# Patient Record
Sex: MALE | Race: WHITE | Employment: FULL TIME | URBAN - METROPOLITAN AREA
[De-identification: names, ages, dates, MRNs, and addresses within clinical notes are randomized per-mention and may not be internally consistent; named-entity substitution may affect disease eponyms.]

---

## 2018-08-29 ENCOUNTER — OFFICE VISIT (OUTPATIENT)
Dept: FAMILY MEDICINE | Facility: CLINIC | Age: 53
End: 2018-08-29
Payer: COMMERCIAL

## 2018-08-29 ENCOUNTER — RADIANT APPOINTMENT (OUTPATIENT)
Dept: GENERAL RADIOLOGY | Facility: CLINIC | Age: 53
End: 2018-08-29
Attending: NURSE PRACTITIONER
Payer: COMMERCIAL

## 2018-08-29 VITALS
TEMPERATURE: 98.7 F | WEIGHT: 315 LBS | OXYGEN SATURATION: 97 % | SYSTOLIC BLOOD PRESSURE: 130 MMHG | RESPIRATION RATE: 14 BRPM | HEART RATE: 79 BPM | DIASTOLIC BLOOD PRESSURE: 96 MMHG

## 2018-08-29 DIAGNOSIS — M79.89 SWELLING OF BOTH LOWER EXTREMITIES: ICD-10-CM

## 2018-08-29 DIAGNOSIS — R07.9 CHEST PAIN, UNSPECIFIED TYPE: Primary | ICD-10-CM

## 2018-08-29 DIAGNOSIS — R06.02 SHORTNESS OF BREATH: ICD-10-CM

## 2018-08-29 DIAGNOSIS — E89.0 POSTOPERATIVE HYPOTHYROIDISM: ICD-10-CM

## 2018-08-29 DIAGNOSIS — R10.12 LUQ ABDOMINAL PAIN: ICD-10-CM

## 2018-08-29 DIAGNOSIS — R07.9 CHEST PAIN, UNSPECIFIED TYPE: ICD-10-CM

## 2018-08-29 DIAGNOSIS — R03.0 ELEVATED BLOOD PRESSURE READING WITHOUT DIAGNOSIS OF HYPERTENSION: ICD-10-CM

## 2018-08-29 PROBLEM — R73.03 PREDIABETES: Status: ACTIVE | Noted: 2018-08-29

## 2018-08-29 LAB
ALBUMIN SERPL-MCNC: 4 G/DL (ref 3.4–5)
ALP SERPL-CCNC: 78 U/L (ref 40–150)
ALT SERPL W P-5'-P-CCNC: 26 U/L (ref 0–70)
ANION GAP SERPL CALCULATED.3IONS-SCNC: 6 MMOL/L (ref 3–14)
AST SERPL W P-5'-P-CCNC: 15 U/L (ref 0–45)
BASOPHILS # BLD AUTO: 0 10E9/L (ref 0–0.2)
BASOPHILS NFR BLD AUTO: 0.4 %
BILIRUB SERPL-MCNC: 0.5 MG/DL (ref 0.2–1.3)
BUN SERPL-MCNC: 16 MG/DL (ref 7–30)
CALCIUM SERPL-MCNC: 8.9 MG/DL (ref 8.5–10.1)
CHLORIDE SERPL-SCNC: 103 MMOL/L (ref 94–109)
CO2 SERPL-SCNC: 29 MMOL/L (ref 20–32)
CREAT SERPL-MCNC: 0.86 MG/DL (ref 0.66–1.25)
DIFFERENTIAL METHOD BLD: NORMAL
EOSINOPHIL # BLD AUTO: 0.2 10E9/L (ref 0–0.7)
EOSINOPHIL NFR BLD AUTO: 2.6 %
ERYTHROCYTE [DISTWIDTH] IN BLOOD BY AUTOMATED COUNT: 13.9 % (ref 10–15)
GFR SERPL CREATININE-BSD FRML MDRD: >90 ML/MIN/1.7M2
GLUCOSE SERPL-MCNC: 95 MG/DL (ref 70–99)
HBA1C MFR BLD: 6.2 % (ref 0–5.6)
HCT VFR BLD AUTO: 44.6 % (ref 40–53)
HGB BLD-MCNC: 14.5 G/DL (ref 13.3–17.7)
LIPASE SERPL-CCNC: 175 U/L (ref 73–393)
LYMPHOCYTES # BLD AUTO: 2.8 10E9/L (ref 0.8–5.3)
LYMPHOCYTES NFR BLD AUTO: 30.7 %
MCH RBC QN AUTO: 29.2 PG (ref 26.5–33)
MCHC RBC AUTO-ENTMCNC: 32.5 G/DL (ref 31.5–36.5)
MCV RBC AUTO: 90 FL (ref 78–100)
MONOCYTES # BLD AUTO: 0.8 10E9/L (ref 0–1.3)
MONOCYTES NFR BLD AUTO: 8.4 %
NEUTROPHILS # BLD AUTO: 5.2 10E9/L (ref 1.6–8.3)
NEUTROPHILS NFR BLD AUTO: 57.9 %
NT-PROBNP SERPL-MCNC: 55 PG/ML (ref 0–125)
PLATELET # BLD AUTO: 302 10E9/L (ref 150–450)
POTASSIUM SERPL-SCNC: 4.3 MMOL/L (ref 3.4–5.3)
PROT SERPL-MCNC: 7.8 G/DL (ref 6.8–8.8)
RBC # BLD AUTO: 4.96 10E12/L (ref 4.4–5.9)
SODIUM SERPL-SCNC: 138 MMOL/L (ref 133–144)
TSH SERPL DL<=0.005 MIU/L-ACNC: 3.56 MU/L (ref 0.4–4)
WBC # BLD AUTO: 9 10E9/L (ref 4–11)

## 2018-08-29 PROCEDURE — 83880 ASSAY OF NATRIURETIC PEPTIDE: CPT | Performed by: NURSE PRACTITIONER

## 2018-08-29 PROCEDURE — 99204 OFFICE O/P NEW MOD 45 MIN: CPT | Performed by: NURSE PRACTITIONER

## 2018-08-29 PROCEDURE — 84443 ASSAY THYROID STIM HORMONE: CPT | Performed by: NURSE PRACTITIONER

## 2018-08-29 PROCEDURE — 80053 COMPREHEN METABOLIC PANEL: CPT | Performed by: NURSE PRACTITIONER

## 2018-08-29 PROCEDURE — 36415 COLL VENOUS BLD VENIPUNCTURE: CPT | Performed by: NURSE PRACTITIONER

## 2018-08-29 PROCEDURE — 93000 ELECTROCARDIOGRAM COMPLETE: CPT | Performed by: NURSE PRACTITIONER

## 2018-08-29 PROCEDURE — 83036 HEMOGLOBIN GLYCOSYLATED A1C: CPT | Performed by: NURSE PRACTITIONER

## 2018-08-29 PROCEDURE — 83690 ASSAY OF LIPASE: CPT | Performed by: NURSE PRACTITIONER

## 2018-08-29 PROCEDURE — 71046 X-RAY EXAM CHEST 2 VIEWS: CPT | Mod: FY

## 2018-08-29 PROCEDURE — 85025 COMPLETE CBC W/AUTO DIFF WBC: CPT | Performed by: NURSE PRACTITIONER

## 2018-08-29 RX ORDER — LEVOTHYROXINE SODIUM 137 UG/1
TABLET ORAL
COMMUNITY
Start: 2018-08-13 | End: 2018-08-29

## 2018-08-29 RX ORDER — LEVOTHYROXINE SODIUM 137 UG/1
TABLET ORAL
COMMUNITY
Start: 2018-08-13 | End: 2019-12-31

## 2018-08-29 RX ORDER — HYDROXYZINE HYDROCHLORIDE 50 MG/1
50 TABLET, FILM COATED ORAL
COMMUNITY
Start: 2018-08-14 | End: 2019-12-09

## 2018-08-29 RX ORDER — METFORMIN HYDROCHLORIDE 750 MG/1
1500 TABLET, EXTENDED RELEASE ORAL
COMMUNITY
Start: 2017-11-20 | End: 2019-07-15

## 2018-08-29 RX ORDER — HYDROXYZINE HYDROCHLORIDE 50 MG/1
TABLET, FILM COATED ORAL
COMMUNITY
Start: 2018-08-14 | End: 2018-08-29

## 2018-08-29 ASSESSMENT — PAIN SCALES - GENERAL: PAINLEVEL: MODERATE PAIN (5)

## 2018-08-29 NOTE — PROGRESS NOTES
"  SUBJECTIVE:   Lupillo Smith is a 53 year old male who presents to clinic today for the following health issues:      CHEST PAIN     Onset: 2-3 weeks    Description:   Location:  right side  Character: achey  Radiation: epigastrium  Duration: intermittent    Intensity: mild    Progression of Symptoms:  worsening    Accompanying Signs & Symptoms:  Shortness of breath: YES  Sweating: YES- not related  Nausea/vomiting: no   Lightheadedness: no   Palpitations: no  Fever/Chills: no   Cough: YES  Heartburn: no     History:   Family history of heart disease no   Tobacco use: YES- occasional cigars    Precipitating factors:   Worse with exertion: no  Worse with deep breaths :  no  Related to food: Not sure    Alleviating factors:  None       Therapies Tried and outcome: None      53 year old male presents with the above concerns. His main concern is lower extremity leg swelling that's been present for several months. He also notes 2-3 weeks of midsternal chest pain that comes randomly. It is not associated with exertion. \"Nothing seems to trigger it.\" Seems to be better with activity - \"If I'm active, I don't have it. I only get it when I'm sitting for a while\" but then notes that sometimes he does get it when he's up and moving around. Pain does not radiate. No diaphoresis. Pain does not come with inspiration/expiration. Reports intermittent mild shortness of breath x1 year. No wheezing. It is not associated with chest pain although sometimes it does occur at the same time which he thinks he coincidence. LE swelling is worse throughout the day, left worse than right. Needs right hip replaced and tends to favor his right leg. Started feeling like he has a cold coming on 4 days ago. His chest pain started long before these cold symptoms. Denies acid reflux. Reports LUQ abdominal pain and \"low chest\" pain as well. Feels like a cramp - different than the chest pain he gets at times. No nausea or vomiting. No history of " "treated hypertension, although it appears he has been borderline hypertensive at many of his visits previously at outside clinics. Smokes cigars couple times per month. Reports a poor diet.    He recently had an insurance change and needs to switch clinics. He also needs referral for new endocrinologist. He has a history of thyroid cancer and had his thyroid removed several years ago. His previous endocrinologist has him on levothyroxine 274 mcg/day. His last TSH was May 2017 per his report. She also started him on metformin 1500 mg q24h for \"pre-diabetes.\"     No primary care provider for a couple of years.      Problem list and histories reviewed & adjusted, as indicated.  Additional history: as documented    Patient Active Problem List   Diagnosis     Prediabetes     History reviewed. No pertinent surgical history.    Social History   Substance Use Topics     Smoking status: Light Tobacco Smoker     Types: Cigars     Smokeless tobacco: Never Used     Alcohol use Yes     History reviewed. No pertinent family history.      Current Outpatient Prescriptions   Medication Sig Dispense Refill     hydrOXYzine (ATARAX) 50 MG tablet Take 50 mg by mouth       levothyroxine (SYNTHROID/LEVOTHROID) 137 MCG tablet TAKE TWO TABLETS BY MOUTH DAILY        metFORMIN (GLUCOPHAGE-XR) 750 MG 24 hr tablet Take 1,500 mg by mouth       Allergies   Allergen Reactions     Oxycodone Nausea and Vomiting     Aspirin Hives     Ibuprofen Hives     Nsaids Swelling       Reviewed and updated as needed this visit by clinical staff  Tobacco  Allergies  Meds  Problems  Med Hx  Surg Hx  Fam Hx  Soc Hx        Reviewed and updated as needed this visit by Provider  Allergies  Meds  Problems         ROS:  Constitutional, HEENT, cardiovascular, pulmonary, GI, , musculoskeletal, neuro, skin, endocrine and psych systems are negative, except as otherwise noted.    OBJECTIVE:     BP (!) 130/96  Pulse 79  Temp 98.7  F (37.1  C) (Oral)  Resp 14  " Wt (!) 371 lb (168.3 kg)  SpO2 97%  There is no height or weight on file to calculate BMI.  GENERAL: healthy, alert, morbidly obese male in no distress.   EYES: Eyes grossly normal to inspection, PERRL and conjunctivae and sclerae normal  HENT: ear canals and TM's normal, nose and mouth without ulcers or lesions  NECK: no adenopathy, no asymmetry, masses, or scars and thyroid normal to palpation  RESP: lungs clear to auscultation - no rales, rhonchi or wheezes  CV: regular rate and rhythm, normal S1 S2, no S3 or S4, no murmur, click or rub, no peripheral edema and peripheral pulses strong  ABDOMEN: soft, nontender, bowel sounds normal - exam difficult due to body habitus   MS: no gross musculoskeletal defects noted, no edema. I am unable to reproduce the chest pain that he's describing with palpation.   PSYCH: mentation appears normal, affect normal/bright    Diagnostic Test Results:  Results for orders placed or performed in visit on 08/29/18   CBC with platelets differential   Result Value Ref Range    WBC 9.0 4.0 - 11.0 10e9/L    RBC Count 4.96 4.4 - 5.9 10e12/L    Hemoglobin 14.5 13.3 - 17.7 g/dL    Hematocrit 44.6 40.0 - 53.0 %    MCV 90 78 - 100 fl    MCH 29.2 26.5 - 33.0 pg    MCHC 32.5 31.5 - 36.5 g/dL    RDW 13.9 10.0 - 15.0 %    Platelet Count 302 150 - 450 10e9/L    Diff Method Automated Method     % Neutrophils 57.9 %    % Lymphocytes 30.7 %    % Monocytes 8.4 %    % Eosinophils 2.6 %    % Basophils 0.4 %    Absolute Neutrophil 5.2 1.6 - 8.3 10e9/L    Absolute Lymphocytes 2.8 0.8 - 5.3 10e9/L    Absolute Monocytes 0.8 0.0 - 1.3 10e9/L    Absolute Eosinophils 0.2 0.0 - 0.7 10e9/L    Absolute Basophils 0.0 0.0 - 0.2 10e9/L   Comprehensive metabolic panel   Result Value Ref Range    Sodium 138 133 - 144 mmol/L    Potassium 4.3 3.4 - 5.3 mmol/L    Chloride 103 94 - 109 mmol/L    Carbon Dioxide 29 20 - 32 mmol/L    Anion Gap 6 3 - 14 mmol/L    Glucose 95 70 - 99 mg/dL    Urea Nitrogen 16 7 - 30 mg/dL     Creatinine 0.86 0.66 - 1.25 mg/dL    GFR Estimate >90 >60 mL/min/1.7m2    GFR Estimate If Black >90 >60 mL/min/1.7m2    Calcium 8.9 8.5 - 10.1 mg/dL    Bilirubin Total 0.5 0.2 - 1.3 mg/dL    Albumin 4.0 3.4 - 5.0 g/dL    Protein Total 7.8 6.8 - 8.8 g/dL    Alkaline Phosphatase 78 40 - 150 U/L    ALT 26 0 - 70 U/L    AST 15 0 - 45 U/L   Lipase   Result Value Ref Range    Lipase 175 73 - 393 U/L   Hemoglobin A1c   Result Value Ref Range    Hemoglobin A1C 6.2 (H) 0 - 5.6 %   BNP-N terminal pro   Result Value Ref Range    N-Terminal Pro Bnp 55 0 - 125 pg/mL   TSH with free T4 reflex   Result Value Ref Range    TSH 3.56 0.40 - 4.00 mU/L   EKG 12-lead complete w/read - Clinics    Narrative    Sinus  Rhythm   Low voltage in precordial leads.    -Old anterior infarct.        ASSESSMENT/PLAN:       ICD-10-CM    1. Chest pain, unspecified type R07.9 EKG 12-lead complete w/read - Clinics     CBC with platelets differential     Comprehensive metabolic panel     Lipase     Hemoglobin A1c     BNP-N terminal pro     XR Chest 2 Views     CARDIOLOGY EVAL ADULT REFERRAL   2. Shortness of breath R06.02 CBC with platelets differential     BNP-N terminal pro     XR Chest 2 Views     CARDIOLOGY EVAL ADULT REFERRAL   3. LUQ abdominal pain R10.12 H Pylori antigen stool     CBC with platelets differential     Comprehensive metabolic panel     Lipase   4. Swelling of both lower extremities M79.89 CBC with platelets differential     BNP-N terminal pro     CARDIOLOGY EVAL ADULT REFERRAL   5. Postoperative hypothyroidism E89.0 ENDOCRINOLOGY ADULT REFERRAL     TSH with free T4 reflex   6. Elevated blood pressure reading without diagnosis of hypertension R03.0 CARDIOLOGY EVAL ADULT REFERRAL     Pleasant 53 year old male with past medical history significant for thyroid cancer and subsequently surgical hypothyroidism, morbid obesity and pre-diabetes with chest pain x3 weeks intermittently, shortness of breath x1 year, and lower extremity edema for  several months. EKG with sinus rhythm and labs unremarkable. Chest pain not reproducible. Doesn't sound pulmonary or musculoskeletal at this time. Will refer to cardiology for further evaluation. Will also refer to endocrinology so he can establish care as he needs a new endocrinologist due to insurance changes. Patient is stable today and pain not currently present, although patient reports that he occurs daily. Patient given close clinic follow up instructions, including emergency department precautions.    See Patient Instructions    The benefits, risks and potential side effects were discussed in detail. Black box warnings discussed as relevant. All patient questions were answered. The patient was instructed to follow up immediately if any adverse reactions develop.    Patient verbalizes understanding and agrees with plan of care. Patient stable for discharge.      CRYSTAL Johnson [facility]

## 2018-08-29 NOTE — PATIENT INSTRUCTIONS
Labs pending - we will notify you with results  Further plan pending results of labs  Please schedule with endocrinology        HOW TO QUIT SMOKING  Smoking is one of the hardest habits to break. About half of all those who have ever smoked have been able to quit, and most of those (about 70%) who still smoke want to quit. Here are some of the best ways to stop smoking.     KEEP TRYING:  It takes most smokers about 8 tries before they are finally able to fully quit. So, the more often you try and fail, the better your chance of quitting the next time! So, don't give up!    GO COLD TURKEY:  Most ex-smokers quit cold turkey. Trying to cut back gradually doesn't seem to work as well, perhaps because it continues the smoking habit. Also, it is possible to fool yourself by inhaling more while smoking fewer cigarettes. This results in the same amount of nicotine in your body!    GET SUPPORT:  Support programs can make an important difference, especially for the heavy smoker. These groups offer lectures, methods to change your behavior and peer support. Call the free national Quitline for more information. 800-QUIT-NOW (211-048-0898). Low-cost or free programs are offered by many hospitals, local chapters of the American Lung Association (977-147-0735) and the American Cancer Society (246-727-5130). Support at home is important too. Non-smokers can help by offering praise and encouragement. If the smoker fails to quit, encourage them to try again!    OVER-THE-COUNTER MEDICINES:  For those who can't quit on their own, Nicotine Replacement Therapy (NRT) may make quitting much easier. Certain aids such as the nicotine patch, gum and lozenge are available without a prescription. However, it is best to use these under the guidance of your doctor. The skin patch provides a steady supply of nicotine to the body. Nicotine gum and lozenge gives temporary bursts of low levels of nicotine. Both methods take the edge off the craving  for cigarettes. WARNING: If you feel symptoms of nicotine overdose, such as nausea, vomiting, dizziness, weakness, or fast heartbeat, stop using these and see your doctor.    PRESCRIPTION MEDICINES:  After evaluating your smoking patterns and prior attempts at quitting, your doctor may offer a prescription medicine such as bupropion (Zyban, Wellbutrin), varenicline (Chantix, Champix), a niocotine inhaler or nasal spray. Each has its unique advantage and side effects which your doctor can review with you.    HEALTH BENEFITS OF QUITTING:  The benefits of quitting start right away and keep improving the longer you go without smokin minutes: blood pressure and pulse return to normal  8 hours: oxygen levels return to normal  2 days: ability to smell and taste begins to improve as damaged nerves start to regrow  2-3 weeks: circulation and lung function improves  1-9 months: decreased cough, congestion and shortness of breath; less tired  1 year: risk of heart attack decreases by half  5 years: risk of lung cancer decreases by half; risk of stroke becomes the same as a non-smoker  For information about how to quit smoking, visit the following links:  National Cancer [place] ,   Clearing the Air, Quit Smoking Today   - an online booklet. http://www.smokefree.gov/pubs/clearing_the_air.pdf  Smokefree.gov http://smokefree.gov/  QuitNet http://www.quitnet.com/    5058-3372 [employer], [address] [place] [address]. All rights reserved. This information is not intended as a substitute for professional medical care. Always follow your healthcare professional's instructions.    The Benefits of Living Smoke Free  What do you want to gain from quitting? Check off some reasons to quit.  Health Benefits  ___ Reduce my risk of lung cancer, heart disease, chronic lung disease  ___ Have fewer wrinkles and softer skin  ___ Improve my sense of taste and smell  ___ For pregnant women--reduce the risk of having a  miscarriage, stillbirth, premature birth, or low-birth-weight baby  Personal Benefits  ___ Feel more in control of my life  ___ Have better-smelling hair, breath, clothes, home, and car  ___ Save time by not having to take smoke breaks, buy cigarettes, or hunt for a light  ___ Have whiter teeth  Family Benefits  ___ Reduce my children s respiratory tract infections  ___ Set a good example for my children  ___ Reduce my family s cancer risk  Financial Benefits  ___ Save hundreds of dollars each year that would be spent on cigarettes  ___ Save money on medical bills  ___ Save on life, health, and car insurance premiums    Those Dollars Add Up!  Cigarettes are expensive, and getting more expensive all the time. Do you realize how much money you are spending on cigarettes per year? What is the average amount you spend on a pack of cigarettes? What is the average number of packs that you smoke per day? Using your answers to these questions, fill in this formula to help you find out:  ($ _____ per pack) ×  ( _____ number of packs per day) × (365 days) =  $ _____ yearly cost of smoking  Besides tobacco, there are other costs, including extra cleaning bills and replacement costs for clothing and furniture; medical expenses for smoking-related illnesses; and higher health, life, and car insurance premiums.    Cigars and Pipes Count Too!  Cigars and pipes are also dangerous. So are smokeless (chewing) tobacco and snuff. All of these products contain nicotine, a highly addictive substance that has harmful effects on your body. Quitting smoking means giving up all tobacco products.      7213-9550 [employer] [facility], [address] [place] [address]. All rights reserved. This information is not intended as a substitute for professional medical care. Always follow your healthcare professional's instructions.      At [facility], we strive to deliver an exceptional experience to you, every time we see  you.  If you receive a survey in the mail, please send us back your thoughts. We really do value your feedback.    Based on your medical history, these are the current health maintenance/preventive care services that you are due for (some may have been done at this visit.)  Health Maintenance Due   Topic Date Due     TOBACCO CESSATION COUNSELING Q1 YR  1965     PHQ-2 Q1 YR  06/04/1979     TETANUS IMMUNIZATION (SYSTEM ASSIGNED)  06/04/1985     HIV SCREEN (SYSTEM ASSIGNED)  06/04/1985     LIPID SCREEN Q5 YR MALE (SYSTEM ASSIGNED)  06/04/2002     COLON CANCER SCREEN (SYSTEM ASSIGNED)  06/04/2017         Suggested websites for health information:  Www.[employer].org : Up to date and easily searchable information on multiple topics.  Www.medlineplus.gov : medication info, interactive tutorials, watch real surgeries online  Www.familydoctor.org : good info from the Academy of Family Physicians  Www.cdc.gov : public health info, travel advisories, epidemics (H1N1)  Www.aap.org : children's health info, normal development, vaccinations  Www.health.[facility].[place].us : [place] dept of health, public health issues in [place], N1N1    Your care team:                            Family Medicine Internal Medicine   MD Eric Daugherty MD Shantel Branch-Fleming, MD Katya Georgiev PA-C Nam Ho, MD Pediatrics   SISI Mendez, MD Triny Chapa CNP, MD Deborah Mielke, MD Kim Thein, APRN [facility]      Clinic hours: Monday - Thursday 7 am-7 pm; Fridays 7 am-5 pm.   Urgent care: Monday - Friday 11 am-9 pm; Saturday and Sunday 9 am-5 pm.  Pharmacy : Monday -Thursday 8 am-8 pm; Friday 8 am-6 pm; Saturday and Sunday 9 am-5 pm.     Clinic: [phone]   Pharmacy: [phone]

## 2018-08-29 NOTE — MR AVS SNAPSHOT
After Visit Summary   8/29/2018    Lupillo Smith    MRN: [id number]           Patient Information     Date Of Birth          1965        Visit Information        Provider Department      8/29/2018 4:20 PM Rocio Paez APRN [facility]        Today's Diagnoses     Chest pain, unspecified type    -  1    Shortness of breath        LUQ abdominal pain        Swelling of both lower extremities        Postoperative hypothyroidism          Care Instructions    Labs pending - we will notify you with results  Further plan pending results of labs  Please schedule with endocrinology        HOW TO QUIT SMOKING  Smoking is one of the hardest habits to break. About half of all those who have ever smoked have been able to quit, and most of those (about 70%) who still smoke want to quit. Here are some of the best ways to stop smoking.     KEEP TRYING:  It takes most smokers about 8 tries before they are finally able to fully quit. So, the more often you try and fail, the better your chance of quitting the next time! So, don't give up!    GO COLD TURKEY:  Most ex-smokers quit cold turkey. Trying to cut back gradually doesn't seem to work as well, perhaps because it continues the smoking habit. Also, it is possible to fool yourself by inhaling more while smoking fewer cigarettes. This results in the same amount of nicotine in your body!    GET SUPPORT:  Support programs can make an important difference, especially for the heavy smoker. These groups offer lectures, methods to change your behavior and peer support. Call the free national Quitline for more information. 800-QUIT-NOW (792-774-0340). Low-cost or free programs are offered by many hospitals, local chapters of the American Lung Association (699-918-4144) and the American Cancer Society (971-163-3459). Support at home is important too. Non-smokers can help by offering praise and encouragement. If the smoker fails to quit,  encourage them to try again!    OVER-THE-COUNTER MEDICINES:  For those who can't quit on their own, Nicotine Replacement Therapy (NRT) may make quitting much easier. Certain aids such as the nicotine patch, gum and lozenge are available without a prescription. However, it is best to use these under the guidance of your doctor. The skin patch provides a steady supply of nicotine to the body. Nicotine gum and lozenge gives temporary bursts of low levels of nicotine. Both methods take the edge off the craving for cigarettes. WARNING: If you feel symptoms of nicotine overdose, such as nausea, vomiting, dizziness, weakness, or fast heartbeat, stop using these and see your doctor.    PRESCRIPTION MEDICINES:  After evaluating your smoking patterns and prior attempts at quitting, your doctor may offer a prescription medicine such as bupropion (Zyban, Wellbutrin), varenicline (Chantix, Champix), a niocotine inhaler or nasal spray. Each has its unique advantage and side effects which your doctor can review with you.    HEALTH BENEFITS OF QUITTING:  The benefits of quitting start right away and keep improving the longer you go without smokin minutes: blood pressure and pulse return to normal  8 hours: oxygen levels return to normal  2 days: ability to smell and taste begins to improve as damaged nerves start to regrow  2-3 weeks: circulation and lung function improves  1-9 months: decreased cough, congestion and shortness of breath; less tired  1 year: risk of heart attack decreases by half  5 years: risk of lung cancer decreases by half; risk of stroke becomes the same as a non-smoker  For information about how to quit smoking, visit the following links:  [facility] ,   Clearing the Air, Quit Smoking Today   - an online booklet. http://www.smokefree.gov/pubs/clearing_the_air.pdf  Smokefree.gov http://smokefree.gov/  QuitNet http://www.quitnet.com/    1659-9279 [place], [address] [facility],  [place] [address]. All rights reserved. This information is not intended as a substitute for professional medical care. Always follow your healthcare professional's instructions.    The Benefits of Living Smoke Free  What do you want to gain from quitting? Check off some reasons to quit.  Health Benefits  ___ Reduce my risk of lung cancer, heart disease, chronic lung disease  ___ Have fewer wrinkles and softer skin  ___ Improve my sense of taste and smell  ___ For pregnant women--reduce the risk of having a miscarriage, stillbirth, premature birth, or low-birth-weight baby  Personal Benefits  ___ Feel more in control of my life  ___ Have better-smelling hair, breath, clothes, home, and car  ___ Save time by not having to take smoke breaks, buy cigarettes, or hunt for a light  ___ Have whiter teeth  Family Benefits  ___ Reduce my children s respiratory tract infections  ___ Set a good example for my children  ___ Reduce my family s cancer risk  Financial Benefits  ___ Save hundreds of dollars each year that would be spent on cigarettes  ___ Save money on medical bills  ___ Save on life, health, and car insurance premiums    Those Dollars Add Up!  Cigarettes are expensive, and getting more expensive all the time. Do you realize how much money you are spending on cigarettes per year? What is the average amount you spend on a pack of cigarettes? What is the average number of packs that you smoke per day? Using your answers to these questions, fill in this formula to help you find out:  ($ _____ per pack) ×  ( _____ number of packs per day) × (365 days) =  $ _____ yearly cost of smoking  Besides tobacco, there are other costs, including extra cleaning bills and replacement costs for clothing and furniture; medical expenses for smoking-related illnesses; and higher health, life, and car insurance premiums.    Cigars and Pipes Count Too!  Cigars and pipes are also dangerous. So are smokeless (chewing) tobacco and snuff.  All of these products contain nicotine, a highly addictive substance that has harmful effects on your body. Quitting smoking means giving up all tobacco products.      9824-8730 [facility], [address] [place] [address], [place] [address]. All rights reserved. This information is not intended as a substitute for professional medical care. Always follow your healthcare professional's instructions.      At [facility], we strive to deliver an exceptional experience to you, every time we see you.  If you receive a survey in the mail, please send us back your thoughts. We really do value your feedback.    Based on your medical history, these are the current health maintenance/preventive care services that you are due for (some may have been done at this visit.)  Health Maintenance Due   Topic Date Due     TOBACCO CESSATION COUNSELING Q1 YR  1965     PHQ-2 Q1 YR  06/04/1979     TETANUS IMMUNIZATION (SYSTEM ASSIGNED)  06/04/1985     HIV SCREEN (SYSTEM ASSIGNED)  06/04/1985     LIPID SCREEN Q5 YR MALE (SYSTEM ASSIGNED)  06/04/2002     COLON CANCER SCREEN (SYSTEM ASSIGNED)  06/04/2017         Suggested websites for health information:  Www.[employer] : Up to date and easily searchable information on multiple topics.  Www.medlineplus.gov : medication info, interactive tutorials, watch real surgeries online  Www.familydoctor.org : good info from the Academy of Family Physicians  Www.cdc.gov : public health info, travel advisories, epidemics (H1N1)  Www.aap.org : children's health info, normal development, vaccinations  Www.health.state.[place].us : [place] dept of health, public health issues in [place], N1N1    Your care team:                            Family Medicine Internal Medicine   MD Eric Daugherty MD Shantel Branch-Fleming, MD Katya Georgiev PA-C Nam Ho, MD Pediatrics   SISI Mendez, MD Triny Chapa CNP, MD    MD Loren Fraga APRN CNP      Clinic hours: Monday - Thursday 7 am-7 pm; Fridays 7 am-5 pm.   Urgent care: Monday - Friday 11 am-9 pm; Saturday and Sunday 9 am-5 pm.  Pharmacy : Monday -Thursday 8 am-8 pm; Friday 8 am-6 pm; Saturday and Sunday 9 am-5 pm.     Clinic: [phone]   Pharmacy: [phone]            Follow-ups after your visit        Additional Services     ENDOCRINOLOGY ADULT REFERRAL       Your provider has referred you to: [facility]:  [facility] [phone]  [email or web address]      Please be aware that coverage of these services is subject to the terms and limitations of your health insurance plan.  Call member services at your health plan with any benefit or coverage questions.      Please bring the following to your appointment:    >>   Any x-rays, CTs or MRIs which have been performed.  Contact the facility where they were done to arrange for  prior to your scheduled appointment.    >>   List of current medications   >>   This referral request   >>   Any documents/labs given to you for this referral                  Future tests that were ordered for you today     Open Future Orders        Priority Expected Expires Ordered    H Pylori antigen stool Routine  9/28/2018 8/29/2018            Who to contact     If you have questions or need follow up information about today's clinic visit or your schedule please contact [facility] directly at [phone].  Normal or non-critical lab and imaging results will be communicated to you by MyChart, letter or phone within 4 business days after the clinic has received the results. If you do not hear from us within 7 days, please contact the clinic through MyChart or phone. If you have a critical or abnormal lab result, we will notify you by phone as soon as possible.  Submit refill requests through [employer] or call your pharmacy and they will forward the  "refill request to us. Please allow 3 business days for your refill to be completed.          Additional Information About Your Visit        Br[employer] Information     [employer] lets you send messages to your doctor, view your test results, renew your prescriptions, schedule appointments and more. To sign up, go to [email or web address] . Click on \"Log in\" on the left side of the screen, which will take you to the Welcome page. Then click on \"Sign up Now\" on the right side of the page.     You will be asked to enter the access code listed below, as well as some personal information. Please follow the directions to create your username and password.     Your access code is: [email or web address]  Expires: 2018  5:39 PM     Your access code will  in 90 days. If you need help or a new code, please call your [facility] clinic or [phone].        Care EveryWhere ID     This is your Care EveryWhere ID. This could be used by other organizations to access your [facility] medical records  [id number]        Your Vitals Were     Pulse Temperature Respirations Pulse Oximetry          79 98.7  F (37.1  C) (Oral) 14 97%         Blood Pressure from Last 3 Encounters:   18 (!) 130/96    Weight from Last 3 Encounters:   18 (!) 371 lb (168.3 kg)              We Performed the Following     BNP-N terminal pro     CBC with platelets differential     Comprehensive metabolic panel     EKG 12-lead complete w/read - Clinics     ENDOCRINOLOGY ADULT REFERRAL     Hemoglobin A1c     Lipase     TSH with free T4 reflex          Today's Medication Changes          These changes are accurate as of 18  5:39 PM.  If you have any questions, ask your nurse or doctor.               These medicines have changed or have updated prescriptions.        Dose/Directions    hydrOXYzine 50 MG tablet   Commonly known as:  ATARAX   This may have changed:  Another medication with the same name was removed. Continue taking this medication, and " follow the directions you see here.   Changed by:  Rocio Paez APRN CNP        Dose:  50 mg   Take 50 mg by mouth   Refills:  0       levothyroxine 137 MCG tablet   Commonly known as:  SYNTHROID/LEVOTHROID   This may have changed:  Another medication with the same name was removed. Continue taking this medication, and follow the directions you see here.   Changed by:  Rocio Paez APRN CNP        TAKE TWO TABLETS BY MOUTH DAILY   Refills:  0                Primary Care Provider Fax #    Physician No Ref-Primary [phone]       No address on file        Equal Access to Services     [facility]: Hadii aad liyah hadasho Soomaali, waaxda luqadaha, qaybta kaalmada adeegyada, waxhorace mg . So [facility] [phone].    ATENCIÓN: Si habla español, tiene a lara disposición servicios gratuitos de asistencia lingüística. Llame al [phone].    We comply with applicable federal civil rights laws and [place] laws. We do not discriminate on the basis of race, color, national origin, age, disability, sex, sexual orientation, or gender identity.            Thank you!     Thank you for choosing [facility]  for your care. Our goal is always to provide you with excellent care. Hearing back from our patients is one way we can continue to improve our services. Please take a few minutes to complete the written survey that you may receive in the mail after your visit with us. Thank you!             Your Updated Medication List - Protect others around you: Learn how to safely use, store and throw away your medicines at www.disposemymeds.org.          This list is accurate as of 8/29/18  5:39 PM.  Always use your most recent med list.                   Brand Name Dispense Instructions for use Diagnosis    hydrOXYzine 50 MG tablet    ATARAX     Take 50 mg by mouth        levothyroxine 137 MCG tablet    SYNTHROID/LEVOTHROID     TAKE TWO TABLETS BY MOUTH DAILY        metFORMIN 750  MG 24 hr tablet    GLUCOPHAGE-XR     Take 1,500 mg by mouth

## 2018-08-30 DIAGNOSIS — R10.12 LUQ ABDOMINAL PAIN: ICD-10-CM

## 2018-08-30 PROCEDURE — 87338 HPYLORI STOOL AG IA: CPT | Performed by: NURSE PRACTITIONER

## 2018-08-30 NOTE — LETTER
September 4, 2018      Lupillo Smith  [address] [address]        Dear Lupillo Smith    Your lab results were normal. Please let us know if you have any questions.   Thank you for allowing us to participate in your care.     Enclosed is a copy of the results.  It was a pleasure to see you at your last appointment.    If you have any questions or concerns, please call myself or my nurse at [phone].      Sincerely,      CRYSTAL Johnson CNP /ALEKS Bliss MA      Results for orders placed or performed in visit on 08/30/18   H Pylori antigen stool   Result Value Ref Range    Specimen Description Feces     H Pylori Antigen       Negative for Helicobacter pylori antigen by enzyme immunoassay. A negative result   indicates the absence of H. pylori antigen or that the level of antigen is below the level   of detection.

## 2018-08-30 NOTE — PROGRESS NOTES
Please let patient know chest x-ray is normal and not causing his symptoms. Please see message about labs and follow up.

## 2018-08-30 NOTE — PROGRESS NOTES
Please call patient. Please let him know all blood work normal except for showing prediabetes.  I recommend watching diet: eat lots of fruits/vegetables and lean meats. Avoid processed foods and added sugars. Get at least 30 minutes of exercise 4-5 days per week. Because all lab testing normal and he has intermittent chest pain, he does have some risk factors for heart etiology although EKG was normal today. I would like him to follow up with cardiology for further testing. Referral placed. Please assist patient to schedule. Please let me know if questions.

## 2018-08-31 ENCOUNTER — TELEPHONE (OUTPATIENT)
Dept: FAMILY MEDICINE | Facility: CLINIC | Age: 53
End: 2018-08-31

## 2018-08-31 LAB
H PYLORI AG STL QL IA: NORMAL
SPECIMEN SOURCE: NORMAL

## 2018-08-31 NOTE — PROGRESS NOTES
Please send letter:    Mr. Smith,  Your lab results were normal. Please let us know if you have any questions.  Thank you for allowing us to participate in your care.  CRYSTAL Johnson CNP

## 2018-08-31 NOTE — TELEPHONE ENCOUNTER
Result Notes   Notes Recorded by Wanda Salas RN on 8/30/2018 at 10:20 AM  Patient updated on the below, no questions at this time, verbalized understanding.  Referral phone number given.  Wanda Salas RN    ------    Notes Recorded by Anna Son on 8/30/2018 at 7:45 AM  No notes recorded by provider  ------    Notes Recorded by Rocio Paez APRN CNP on 8/29/2018 at 10:07 PM  Please call patient. Please let him know all blood work normal except for showing prediabetes.  I recommend watching diet: eat lots of fruits/vegetables and lean meats. Avoid processed foods and added sugars. Get at least 30 minutes of exercise 4-5 days per week. Because all lab testing normal and he has intermittent chest pain, he does have some risk factors for heart etiology although EKG was normal today. I would like him to follow up with cardiology for further testing. Referral placed. Please assist patient to schedule. Please let me know if questions.     Notes Recorded by Rocio Paez APRN CNP on 8/29/2018 at 10:08 PM  Please let patient know chest x-ray is normal and not causing his symptoms. Please see message about labs and follow up.    Called and spoke with patient. He states understanding about Chest X-Ray result. He was called yesterday by another nurse regarding the other lab work above, continues to have no further questions on those results. He did ask about his stool sample test, at this time the H. Pylori Antigen testing is still in process, patient states understanding. He states he does have referral phone number for cardiology and will call to schedule soon. He denies any other questions or concerns.     Salome Lizama RN

## 2018-09-07 ENCOUNTER — PRE VISIT (OUTPATIENT)
Dept: CARDIOLOGY | Facility: CLINIC | Age: 53
End: 2018-09-07

## 2018-09-07 NOTE — TELEPHONE ENCOUNTER
PREVISIT INFORMATION                                                    Lupillo Luis scheduled for future visit at [facility] specialty clinics.    Patient is scheduled to see Dr Kaur on 9/11  Reason for visit: chest pains  Referring provider Rocio Paez  Has patient seen previous specialist? No  Medical Records:  Available in chart.  Patient was previously seen at a [facility] or [facility] facility.    REVIEW                                                      New patient packet mailed to patient: No  Medication reconciliation complete: Yes      Current Outpatient Prescriptions   Medication Sig Dispense Refill     hydrOXYzine (ATARAX) 50 MG tablet Take 50 mg by mouth       levothyroxine (SYNTHROID/LEVOTHROID) 137 MCG tablet TAKE TWO TABLETS BY MOUTH DAILY        metFORMIN (GLUCOPHAGE-XR) 750 MG 24 hr tablet Take 1,500 mg by mouth         Allergies: Oxycodone; Aspirin; Ibuprofen; and Nsaids        PLAN/FOLLOW-UP NEEDED                                                      Previsit review complete.  Patient will see provider at future scheduled appointment.     Patient Reminders Given:  Clinic location reviewed.  If you need to cancel or reschedule,please call [phone].    Luba Nair

## 2018-09-11 ENCOUNTER — OFFICE VISIT (OUTPATIENT)
Dept: CARDIOLOGY | Facility: CLINIC | Age: 53
End: 2018-09-11
Attending: NURSE PRACTITIONER
Payer: COMMERCIAL

## 2018-09-11 VITALS
DIASTOLIC BLOOD PRESSURE: 110 MMHG | OXYGEN SATURATION: 96 % | HEART RATE: 84 BPM | WEIGHT: 315 LBS | SYSTOLIC BLOOD PRESSURE: 153 MMHG

## 2018-09-11 DIAGNOSIS — R07.9 CHEST PAIN, UNSPECIFIED TYPE: Primary | ICD-10-CM

## 2018-09-11 PROCEDURE — 99214 OFFICE O/P EST MOD 30 MIN: CPT | Performed by: INTERNAL MEDICINE

## 2018-09-11 PROCEDURE — 93000 ELECTROCARDIOGRAM COMPLETE: CPT | Performed by: INTERNAL MEDICINE

## 2018-09-11 ASSESSMENT — PAIN SCALES - GENERAL: PAINLEVEL: NO PAIN (1)

## 2018-09-11 NOTE — PATIENT INSTRUCTIONS
The following is a summary of your office visit:    Medications started today:none    Medications stopped today:none    Medication dose change: none    Nurse contact information: Luba Nair RN  Cardiology Care Coordinator  [phone] Phone  [phone] Fax    Appointments made today: none    Patient instructions: Call with any questions or concerns.      If you have had any blood work, imaging or other testing completed we will be in touch within 1-2 weeks regarding the results. If you have any questions, concerns or need to schedule a follow up, please contact us at [phone]. If you are needing refills please contact your pharmacy. For urgent after hour care please call the [facility] at [phone] or the [facility] at [phone] and ask to speak to the cardiologist on call.    It was a pleasure meeting with you today. Please let us know if there is anything else we can do for you so that we can be sure you are leaving completely satisfied with your care experience.     Your Cardiology Team at [facility]  RN Care Coordinator: Luba

## 2018-09-11 NOTE — NURSING NOTE
Lupillo Smith's goals for this visit include:   Chief Complaint   Patient presents with     Consult     chest pain       He requests these members of his care team be copied on today's visit information: PCP    PCP: No Ref-Primary, Physician    Referring Provider:  CRYSTAL Shaikh CNP  [address] [place] [address]    BP (!) 153/110 (BP Location: Left arm, Patient Position: Chair, Cuff Size: Adult Large)  Pulse 84  Wt (!) 167.6 kg (369 lb 8 oz)  SpO2 96%    Do you need any medication refills at today's visit? None      Domonique Vasques, CMA

## 2018-09-11 NOTE — PROGRESS NOTES
"2018      Rocio Paez, APR, CNP   [address] [address]      Patient:  Lupillo Smith   MRN:  [id number]   :  1965      Dear Rocio Paez:       It was a pleasure participating in the care of your patient, Mr. Lupillo Smith.  As you know, he is a 53-year-old gentleman who I see today for chest discomfort.      His past medical history is significant for the followin.  \"Prediabetes\".   2.  Thyroid cancer, status post surgery.   3.  Bilateral hip problems, status post left hip replacement in the past.       The patient denies having a significant history of cardiac disease.      In terms of his present symptom complex, he says that he has been having a month history of mild chest discomfort.  He has been renovating his home and he did tear not out a cast iron tub and lifted it all by himself recently, but does not remember actually injuring the area.  He says that it is better when hunches forward and worse when he puffs his chest out and stretches.  When he exerts himself, he does not notice it at all.      The patient has also noticed some swelling in his legs.  He says that first thing in the morning his legs are normal, then as the day progresses they will swell and then after going to sleep his legs will become normal again.  He otherwise denies any significant shortness of breath, PND, orthopnea, edema, palpitations, syncope or near-syncope.      CURRENT MEDICATIONS:     1.  Hydroxyzine   2.  Metformin.   3.  Levothyroxine.      PHYSICAL EXAMINATION:     VITAL SIGNS:  The patient's blood pressure here is 150/100, pulse of 84.  His weight is 369 pounds.   NECK:  Exam reveals a jugular venous pressure that is difficult to appreciate but likely normal.   LUNGS:  Clear to auscultation.  Respiratory effort is normal.   CARDIAC:  Reveals a regular rate and rhythm, no obvious murmur or gallop appreciated.   ABDOMEN:  Belly " soft, nontender.   EXTREMITIES:  Without gross edema.     CHEST:  Upon examination of the chest, when he stretches his arms open wide, he can point to a specific area that is causing discomfort and when palpating the area it aggravates the discomfort. When he scrunches his shoulders forward  it is relieved.      EKG today reveals normal sinus rhythm at a rate of 77 beats per minute, no gross ST changes.  On 8/29/2018 potassium 4.3.  GFR, hemoglobin and TSH normal.      Echocardiogram 9/10/2011, normal LV systolic function, no significant valvular pathology identified.        IMPRESSION:      Lupillo is a 53-year-old gentleman without a prior documented history of cardiac disease who presents with 2 issues:     1.  Chest discomfort, likely musculoskeletal in etiology.      The patient lifted a cast iron tub while renovating his house recently and for the past month has had point tenderness in the left side of his chest close to the sternum.  We were able to recreate the discomfort today through stretching and direct palpation.  Empiric therapy would be indicated.      2.  Lower extremity edema, left greater than right, likely secondary to venous insufficiency.      The patient is euvolemic today. His legs swell as he is in an upright position throughout the day and the swelling resolves after a full night's sleep. Empiric therapy again would be indicated.        PLAN:     1.  Rest, ice and ibuprofen as needed for musculoskeletal chest discomfort.  If his symptoms should persist, worsen or change, we can also consider further workup in the future if needed.     2.  As far as his legs are concerned, elevation above cardiac level when resting, compression stockings if needed.     3. The patient can be seen here on an as needed basis in the future, we can also consider further workup if needed  for his symptoms should change, persist or worsen in frequency or severity.      Once again, it was a pleasure participating in  the care of your patient, Mr. Lupillo Humphrey.  Please feel free to contact me at any time if you have any questions regarding his care in the future.         Sincerely,      ALPHONSE WEI MD             D: 2018   T: 2018   MT: RAQUEL      Name:     LUPILLO HUMPHREY   MRN:      [id number]        Account:      [id number]   :      1965      Document: [id number]       cc: Rocio ROJAS, CNP

## 2018-09-11 NOTE — MR AVS SNAPSHOT
After Visit Summary   9/11/2018    Lupillo Smith    MRN: [id number]           Patient Information     Date Of Birth          1965        Visit Information        Provider Department      9/11/2018 9:30 AM Ney Kaur MD [facility]        Today's Diagnoses     Chest pain, unspecified type    -  1      Care Instructions      The following is a summary of your office visit:    Medications started today:none    Medications stopped today:none    Medication dose change: none    Nurse contact information: Luba Nair RN  Cardiology Care Coordinator  [phone] Phone  [phone] Fax    Appointments made today: none    Patient instructions: Call with any questions or concerns.      If you have had any blood work, imaging or other testing completed we will be in touch within 1-2 weeks regarding the results. If you have any questions, concerns or need to schedule a follow up, please contact us at [phone]. If you are needing refills please contact your pharmacy. For urgent after hour care please call the [facility] at [phone] or the [facility] at [phone] and ask to speak to the cardiologist on call.    It was a pleasure meeting with you today. Please let us know if there is anything else we can do for you so that we can be sure you are leaving completely satisfied with your care experience.     Your Cardiology Team at [facility]  RN Care Coordinator: Luba                      Follow-ups after your visit        Who to contact     If you have questions or need follow up information about today's clinic visit or your schedule please contact [facility] directly at [phone].  Normal or non-critical lab and imaging results will be communicated to you by MyChart, letter or phone within 4 business days after the clinic has received the results. If you do not hear from us within 7 days,  please contact the clinic through [employer] or phone. If you have a critical or abnormal lab result, we will notify you by phone as soon as possible.  Submit refill requests through [employer] or call your pharmacy and they will forward the refill request to us. Please allow 3 business days for your refill to be completed.          Additional Information About Your Visit        [employer] Information     [employer] is an electronic gateway that provides easy, online access to your medical records. With [employer], you can request a clinic appointment, read your test results, renew a prescription or communicate with your care team.     To sign up for [employer] visit the website at [email or web address]"   You will be asked to enter the access code listed below, as well as some personal information. Please follow the directions to create your username and password.     Your access code is: [email or web address]  Expires: 2018  5:39 PM     Your access code will  in 90 days. If you need help or a new code, please contact your [facility] or call [phone] for assistance.        Care EveryWhere ID     This is your Care EveryWhere ID. This could be used by other organizations to access your [place] medical records  [id number]        Your Vitals Were     Pulse Pulse Oximetry                84 96%           Blood Pressure from Last 3 Encounters:   18 (!) 153/110   18 (!) 130/96    Weight from Last 3 Encounters:   18 (!) 167.6 kg (369 lb 8 oz)   18 (!) 168.3 kg (371 lb)              Today, you had the following     No orders found for display         Today's Medication Changes          These changes are accurate as of 18 10:23 AM.  If you have any questions, ask your nurse or doctor.               Start taking these medicines.        Dose/Directions    * Blood Pressure Monitor Kit   Used for:  Chest pain, unspecified type   Started by:  Ney Kaur MD         Dose:  1 Device   1 Device daily   Quantity:  1 kit   Refills:  1       * Blood Pressure Monitor Kit   Used for:  Chest pain, unspecified type   Started by:  Ney Kaur MD        Dose:  1 Device   1 Device daily   Quantity:  1 kit   Refills:  1       * Notice:  This list has 2 medication(s) that are the same as other medications prescribed for you. Read the directions carefully, and ask your doctor or other care provider to review them with you.         Where to get your medicines      These medications were sent to [id number] - [place], [place] - [address]  [address], [facility] [address]     Phone:  [phone]     Blood Pressure Monitor Kit         Some of these will need a paper prescription and others can be bought over the counter.  Ask your nurse if you have questions.     Bring a paper prescription for each of these medications     Blood Pressure Monitor Kit                Primary Care Provider Fax #    Physician No Ref-Primary [phone]       No address on file        Equal Access to Services     ELYSSA FLORES : Hadii lizbeth pelletier hadasho Soomaali, waaxda luqadaha, qaybta kaalmada adeegyada, adriana lloydin abimael mg . So [facility] [phone].    ATENCIÓN: Si moe olson, tiene a lara disposición servicios gratuitos de asistencia lingüística. Llame al [phone].    We comply with applicable federal civil rights laws and [place] laws. We do not discriminate on the basis of race, color, national origin, age, disability, sex, sexual orientation, or gender identity.            Thank you!     Thank you for choosing [facility]  for your care. Our goal is always to provide you with excellent care. Hearing back from our patients is one way we can continue to improve our services. Please take a few minutes to complete the written survey that you may receive in the mail after your visit with us. Thank you!             Your Updated Medication  List - Protect others around you: Learn how to safely use, store and throw away your medicines at www.disposemymeds.org.          This list is accurate as of 9/11/18 10:23 AM.  Always use your most recent med list.                   Brand Name Dispense Instructions for use Diagnosis    * Blood Pressure Monitor Kit     1 kit    1 Device daily    Chest pain, unspecified type       * Blood Pressure Monitor Kit     1 kit    1 Device daily    Chest pain, unspecified type       hydrOXYzine 50 MG tablet    ATARAX     Take 50 mg by mouth        levothyroxine 137 MCG tablet    SYNTHROID/LEVOTHROID     TAKE TWO TABLETS BY MOUTH DAILY        metFORMIN 750 MG 24 hr tablet    GLUCOPHAGE-XR     Take 1,500 mg by mouth        * Notice:  This list has 2 medication(s) that are the same as other medications prescribed for you. Read the directions carefully, and ask your doctor or other care provider to review them with you.

## 2019-04-04 ENCOUNTER — TELEPHONE (OUTPATIENT)
Dept: FAMILY MEDICINE | Facility: CLINIC | Age: 54
End: 2019-04-04

## 2019-04-04 NOTE — LETTER
April 4, 2019      Lupillo Smith  [address] [address]      Dear Lupillo,     At [facility] we care about your health and are committed to providing quality patient care. Which includes staying current on preventive cancer screenings.  You can increase your chances of finding and treating cancers through regular screenings.      Our records indicate you may be due for the following preventive screening(s):    Colonoscopy  Colonoscopy is recommended every ten years for everyone age 50 and older. Please take a moment to read over the enclosed information packet about colon cancer screening. We strongly urge our patient's to consider having a colonoscopy done, which is the best screening test available and only needs to be done every 10 years if normal. If you are unwilling or unable to have a colonoscopy then we recommend the annual stool testing for blood. This test is called a FIT test and it looks for blood in the stool.     To schedule an appointment or discuss this screening further, you may contact us by phone at the [facility] at [phone] or online through the patient [email or web address] @ [email or web address]    If you have had any of the screenings listed above at another facility, please call us so that we may update your chart.      Your partners in health,        Quality Committee at [facility]

## 2019-06-04 ENCOUNTER — ANCILLARY PROCEDURE (OUTPATIENT)
Dept: GENERAL RADIOLOGY | Facility: CLINIC | Age: 54
End: 2019-06-04
Attending: PHYSICIAN ASSISTANT
Payer: COMMERCIAL

## 2019-06-04 ENCOUNTER — OFFICE VISIT (OUTPATIENT)
Dept: FAMILY MEDICINE | Facility: CLINIC | Age: 54
End: 2019-06-04
Payer: COMMERCIAL

## 2019-06-04 VITALS
WEIGHT: 315 LBS | TEMPERATURE: 99.4 F | RESPIRATION RATE: 18 BRPM | HEART RATE: 84 BPM | DIASTOLIC BLOOD PRESSURE: 88 MMHG | BODY MASS INDEX: 41.75 KG/M2 | OXYGEN SATURATION: 96 % | SYSTOLIC BLOOD PRESSURE: 137 MMHG | HEIGHT: 73 IN

## 2019-06-04 DIAGNOSIS — Z12.11 SCREEN FOR COLON CANCER: ICD-10-CM

## 2019-06-04 DIAGNOSIS — Z13.220 SCREENING FOR HYPERLIPIDEMIA: ICD-10-CM

## 2019-06-04 DIAGNOSIS — M16.11 PRIMARY OSTEOARTHRITIS OF RIGHT HIP: Primary | ICD-10-CM

## 2019-06-04 DIAGNOSIS — E03.9 HYPOTHYROIDISM, UNSPECIFIED TYPE: ICD-10-CM

## 2019-06-04 DIAGNOSIS — M16.11 PRIMARY OSTEOARTHRITIS OF RIGHT HIP: ICD-10-CM

## 2019-06-04 DIAGNOSIS — E66.01 MORBID OBESITY (H): ICD-10-CM

## 2019-06-04 PROCEDURE — 73502 X-RAY EXAM HIP UNI 2-3 VIEWS: CPT

## 2019-06-04 PROCEDURE — 99214 OFFICE O/P EST MOD 30 MIN: CPT | Performed by: PHYSICIAN ASSISTANT

## 2019-06-04 PROCEDURE — 72170 X-RAY EXAM OF PELVIS: CPT

## 2019-06-04 RX ORDER — METFORMIN HYDROCHLORIDE 750 MG/1
1500 TABLET, EXTENDED RELEASE ORAL
COMMUNITY
Start: 2019-01-21 | End: 2019-12-31

## 2019-06-04 ASSESSMENT — PAIN SCALES - GENERAL: PAINLEVEL: MODERATE PAIN (5)

## 2019-06-04 ASSESSMENT — MIFFLIN-ST. JEOR: SCORE: 2533.31

## 2019-06-04 NOTE — PROGRESS NOTES
Subjective     Lupillo Smith is a 54 year old male who presents to clinic today for the following health issues:    HPI   Joint Pain    Onset: 25 plus years    Description:   Location: Right hip OA  Character: Sharp and Stabbing    Intensity: severe constant 5/10 and gets worse     Progression of Symptoms: worse    Accompanying Signs & Symptoms:  Other symptoms: radiation of pain to right knee     History:   Previous similar pain: YES- Had the left side replaced at that time he was told that right hip will need replacement      Precipitating factors:   Trauma or overuse: YES- football     Alleviating factors:  Improved by: adjustable bed     Therapies Tried and outcome: stretching-helps a little bit, tylenol- no relief       Hypothyroidism Follow-up      Since last visit, patient describes the following symptoms: Weight stable, no hair loss, no skin changes, no constipation, no loose stools  Patient used to see endocrinologist and wants referral to a new one within [facility]     Patient Active Problem List   Diagnosis     Prediabetes     Hypothyroidism     Morbid obesity (H)     History reviewed. No pertinent surgical history.    Social History     Tobacco Use     Smoking status: Current Some Day Smoker     Types: Cigars     Smokeless tobacco: Never Used     Tobacco comment: One cigar every three months   Substance Use Topics     Alcohol use: Yes     Family History   Family history unknown: Yes         Current Outpatient Medications   Medication Sig Dispense Refill     hydrOXYzine (ATARAX) 50 MG tablet Take 50 mg by mouth       levothyroxine (SYNTHROID/LEVOTHROID) 137 MCG tablet TAKE TWO TABLETS BY MOUTH DAILY        metFORMIN (GLUCOPHAGE-XR) 750 MG 24 hr tablet Take 1,500 mg by mouth       Blood Pressure Monitor KIT 1 Device daily (Patient not taking: Reported on 6/4/2019) 1 kit 1     Blood Pressure Monitor KIT 1 Device daily (Patient not taking: Reported on 6/4/2019) 1 kit 1     metFORMIN (GLUCOPHAGE-XR)  "750 MG 24 hr tablet Take 1,500 mg by mouth       Allergies   Allergen Reactions     Oxycodone Nausea and Vomiting     Aspirin Hives     Ibuprofen Hives     Nsaids Swelling         Reviewed and updated as needed this visit by Provider         Review of Systems   ROS COMP: Constitutional, HEENT, cardiovascular, pulmonary, gi and gu systems are negative, except as otherwise noted.      Objective    /88 (BP Location: Left arm, Patient Position: Sitting, Cuff Size: Thigh)   Pulse 84   Temp 99.4  F (37.4  C) (Oral)   Resp 18   Ht 1.848 m (6' 0.75\")   Wt (!) 163.3 kg (360 lb 1.6 oz)   SpO2 96%   BMI 47.84 kg/m    Body mass index is 47.84 kg/m .  Physical Exam   GENERAL: alert, no distress and obese  NECK: no adenopathy, no asymmetry, masses, or scars and thyroid normal to palpation  RESP: lungs clear to auscultation - no rales, rhonchi or wheezes  CV: regular rate and rhythm, normal S1 S2, no S3 or S4, no murmur, click or rub, no peripheral edema and peripheral pulses strong  ABDOMEN: soft, nontender, no hepatosplenomegaly, no masses and bowel sounds normal  MS: no gross musculoskeletal defects noted, no edema    Diagnostic Test Results:  Labs reviewed in Epic  Xray - right hip-moderate degenerative changes         Assessment & Plan       ICD-10-CM    1. Primary osteoarthritis of right hip M16.11 XR Hip Right 2-3 Views     XR Pelvis 1/2 Views     ORTHO  REFERRAL   2. Hypothyroidism, unspecified type E03.9 ENDOCRINOLOGY ADULT REFERRAL   3. Morbid obesity (H) E66.01 ENDOCRINOLOGY ADULT REFERRAL   4. Screen for colon cancer Z12.11 GASTROENTEROLOGY ADULT REF PROCEDURE ONLY   5. Screening for hyperlipidemia Z13.220      1. make appointment with ortho   Continue Tylenol or Ibuprofen as needed   2. Endocrinology referral was provided.  Continue current dose of Levothyroxine  3. Patient will see endocrinology and weight loss clinic     Tobacco Cessation:   reports that he has been smoking cigars-1 a month or " "less.  He has never used smokeless tobacco.        BMI:   Estimated body mass index is 47.84 kg/m  as calculated from the following:    Height as of this encounter: 1.848 m (6' 0.75\").    Weight as of this encounter: 163.3 kg (360 lb 1.6 oz).   Weight management plan: Patient referred to endocrine and/or weight management specialty Discussed healthy diet and exercise guidelines            No follow-ups on file.    Renae Crockett PA-C  [facility]      "

## 2019-07-15 ENCOUNTER — OFFICE VISIT (OUTPATIENT)
Dept: FAMILY MEDICINE | Facility: CLINIC | Age: 54
End: 2019-07-15
Payer: COMMERCIAL

## 2019-07-15 VITALS
TEMPERATURE: 98.4 F | BODY MASS INDEX: 41.75 KG/M2 | WEIGHT: 315 LBS | DIASTOLIC BLOOD PRESSURE: 88 MMHG | SYSTOLIC BLOOD PRESSURE: 132 MMHG | HEIGHT: 73 IN | RESPIRATION RATE: 16 BRPM | OXYGEN SATURATION: 96 % | HEART RATE: 82 BPM

## 2019-07-15 DIAGNOSIS — E03.9 HYPOTHYROIDISM, UNSPECIFIED TYPE: ICD-10-CM

## 2019-07-15 DIAGNOSIS — E66.01 MORBID OBESITY (H): ICD-10-CM

## 2019-07-15 DIAGNOSIS — Z01.818 PREOP GENERAL PHYSICAL EXAM: Primary | ICD-10-CM

## 2019-07-15 DIAGNOSIS — Z12.11 SPECIAL SCREENING FOR MALIGNANT NEOPLASMS, COLON: ICD-10-CM

## 2019-07-15 PROCEDURE — 99214 OFFICE O/P EST MOD 30 MIN: CPT | Performed by: NURSE PRACTITIONER

## 2019-07-15 PROCEDURE — 93000 ELECTROCARDIOGRAM COMPLETE: CPT | Performed by: NURSE PRACTITIONER

## 2019-07-15 ASSESSMENT — MIFFLIN-ST. JEOR: SCORE: 2518.34

## 2019-07-15 NOTE — PROGRESS NOTES
[facility]  [address] [facility] [address]  [phone]  Dept: [phone]    PRE-OP EVALUATION:  Today's date: 7/15/2019    Lupillo Smith (: 1965) presents for pre-operative evaluation assessment as requested by Dr. Yo.  He requires evaluation and anesthesia risk assessment prior to undergoing surgery/procedure for treatment of colon cancer screening .    Proposed Surgery/ Procedure: Colonoscopy  Date of Surgery/ Procedure: 19  Time of Surgery/ Procedure: 0800  Hospital/Surgical Facility: [facility]   Fax number for surgical facility: [phone]  Primary Physician: No Ref-Primary, Physician  Type of Anesthesia Anticipated: MAC    Patient has a Health Care Directive or Living Will:  NO    1. NO - Do you have a history of heart attack, stroke, stent, bypass or surgery on an artery in the head, neck, heart or legs?  2. NO - Do you ever have any pain or discomfort in your chest?  3. NO - Do you have a history of  Heart Failure?  4. NO - Are you troubled by shortness of breath when: walking on the level, up a slight hill or at night?  5. NO - Do you currently have a cold, bronchitis or other respiratory infection?  6. NO - Do you have a cough, shortness of breath or wheezing?  7. NO - Do you sometimes get pains in the calves of your legs when you walk?  8. NO - Do you or anyone in your family have previous history of blood clots?  9. NO - Do you or does anyone in your family have a serious bleeding problem such as prolonged bleeding following surgeries or cuts?  10. NO - Have you ever had problems with anemia or been told to take iron pills?  11. NO - Have you had any abnormal blood loss such as black, tarry or bloody stools, or abnormal vaginal bleeding?  12. NO - Have you ever had a blood transfusion?  13. NO - Have you or any of your relatives ever had problems with anesthesia?  14. NO - Do you have sleep apnea, excessive snoring or daytime drowsiness?  15.  NO - Do you have any prosthetic heart valves?  16. YES - DO YOU HAVE PROSTHETIC JOINTS? L hip 10/13  17. NO - Is there any chance that you may be pregnant?      HPI:     HPI related to upcoming procedure: Colonoscopy for colon cancer screening.      DIABETES - Patient has a longstanding history of DiabetesType Type II . Patient is being treated with oral agents and denies significant side effects. Control has been good. Complicating factors include but are not limited to: morbid obesity .     HYPOTHYROIDISM - Patient has a longstanding history of chronic Hypothyroidism. Patient has been doing well, noting no tremor, insomnia, hair loss or changes in skin texture. Continues to take medications as directed, without adverse reactions or side effects. Last TSH   Lab Results   Component Value Date    TSH 3.56 08/29/2018   .        MEDICAL HISTORY:     Patient Active Problem List    Diagnosis Date Noted     Morbid obesity (H) 06/04/2019     Priority: Medium     Hypothyroidism 11/06/2018     Priority: Medium     Prediabetes 08/29/2018     Priority: Medium      History reviewed. No pertinent past medical history.  History reviewed. No pertinent surgical history.  Current Outpatient Medications   Medication Sig Dispense Refill     hydrOXYzine (ATARAX) 50 MG tablet Take 50 mg by mouth       levothyroxine (SYNTHROID/LEVOTHROID) 137 MCG tablet TAKE TWO TABLETS BY MOUTH DAILY        metFORMIN (GLUCOPHAGE-XR) 750 MG 24 hr tablet Take 1,500 mg by mouth       Blood Pressure Monitor KIT 1 Device daily (Patient not taking: Reported on 6/4/2019) 1 kit 1     Blood Pressure Monitor KIT 1 Device daily (Patient not taking: Reported on 6/4/2019) 1 kit 1     OTC products: no recent use of OTC ASA, NSAIDS or Steroids    Allergies   Allergen Reactions     Oxycodone Nausea and Vomiting     Aspirin Hives     Ibuprofen Hives     Nsaids Swelling      Latex Allergy: NO    Social History     Tobacco Use     Smoking status: Current Some Day Smoker  "    Types: Cigars     Smokeless tobacco: Never Used     Tobacco comment: One cigar every three months   Substance Use Topics     Alcohol use: Yes     Comment: rare     History   Drug Use No       REVIEW OF SYSTEMS:   CONSTITUTIONAL: NEGATIVE for fever, chills, change in weight  INTEGUMENTARY/SKIN: NEGATIVE for worrisome rashes, moles or lesions  ENT/MOUTH: NEGATIVE for ear, mouth and throat problems  RESP: NEGATIVE for significant cough or SOB  CV: NEGATIVE for chest pain, palpitations or peripheral edema  : negative for dysuria, hematuria, decreased urinary stream, erectile dysfunction  NEURO: NEGATIVE for weakness, dizziness or paresthesias  ENDOCRINE: NEGATIVE for temperature intolerance, skin/hair changes    EXAM:   /88   Pulse 82   Temp 98.4  F (36.9  C) (Tympanic)   Resp 16   Ht 1.848 m (6' 0.75\")   Wt (!) 161.8 kg (356 lb 12.8 oz)   SpO2 96%   BMI 47.40 kg/m    GENERAL APPEARANCE: healthy, alert and no distress  HENT: ear canals and TM's normal and nose and mouth without ulcers or lesions  RESP: lungs clear to auscultation - no rales, rhonchi or wheezes  CV: regular rate and rhythm, normal S1 S2, no S3 or S4 and no murmur, click or rub   ABDOMEN: soft, nontender, no HSM or masses and bowel sounds normal  NEURO: Normal strength and tone, sensory exam grossly normal, mentation intact and speech normal    DIAGNOSTICS:   EKG: Normal Sinus Rhythm, low voltage in precordial leads, no acute ST/T changes c/w ischemia, unchanged from previous tracings    Recent Labs   Lab Test 08/29/18  1745   HGB 14.5         POTASSIUM 4.3   CR 0.86   A1C 6.2*        IMPRESSION:   Reason for surgery/procedure: colon cancer screening.    The proposed surgical procedure is considered LOW risk.    REVISED CARDIAC RISK INDEX  The patient has the following serious cardiovascular risks for perioperative complications such as (MI, PE, VFib and 3  AV Block):  No serious cardiac risks  INTERPRETATION: 0 risks: " Class I (very low risk - 0.4% complication rate)    The patient has the following additional risks for perioperative complications:  Morbid obesity      ICD-10-CM    1. Preop general physical exam Z01.818 EKG 12-lead complete w/read - Clinics   2. Special screening for malignant neoplasms, colon Z12.11    3. Morbid obesity (H) E66.01    4. Hypothyroidism, unspecified type E03.9        RECOMMENDATIONS:       Obstructive Sleep Apnea (or suspected sleep apnea)  Hospital staff are advised to monitor for sleep related oxygen desaturations due to suspicion of GLENN      --Patient is to take all scheduled medications on the day of surgery EXCEPT for modifications listed below.    Diabetes Medication Use  -----Hold usual oral and non-insulin diabetic meds (e.g. Metformin, Actos, Glipizide) while NPO.       APPROVAL GIVEN to proceed with proposed procedure, without further diagnostic evaluation       Signed Electronically by: CRYSTAL Laura CNP    Copy of this evaluation report is provided to requesting physician.    [facility] Preop Guidelines    Revised Cardiac Risk Index

## 2019-10-17 ENCOUNTER — TELEPHONE (OUTPATIENT)
Dept: OTHER | Facility: CLINIC | Age: 54
End: 2019-10-17

## 2019-12-09 ENCOUNTER — OFFICE VISIT (OUTPATIENT)
Dept: ALLERGY | Facility: CLINIC | Age: 54
End: 2019-12-09
Payer: COMMERCIAL

## 2019-12-09 VITALS
DIASTOLIC BLOOD PRESSURE: 95 MMHG | WEIGHT: 315 LBS | SYSTOLIC BLOOD PRESSURE: 143 MMHG | OXYGEN SATURATION: 95 % | HEART RATE: 72 BPM | BODY MASS INDEX: 45.36 KG/M2 | TEMPERATURE: 98 F

## 2019-12-09 DIAGNOSIS — L50.1 CHRONIC IDIOPATHIC URTICARIA: Primary | ICD-10-CM

## 2019-12-09 PROCEDURE — 99203 OFFICE O/P NEW LOW 30 MIN: CPT | Performed by: ALLERGY & IMMUNOLOGY

## 2019-12-09 RX ORDER — HYDROXYZINE HYDROCHLORIDE 50 MG/1
50 TABLET, FILM COATED ORAL EVERY 6 HOURS PRN
Qty: 120 TABLET | Refills: 3 | Status: SHIPPED | OUTPATIENT
Start: 2019-12-09 | End: 2020-11-25

## 2019-12-09 ASSESSMENT — ENCOUNTER SYMPTOMS
JOINT SWELLING: 0
WHEEZING: 0
DIARRHEA: 0
RHINORRHEA: 0
VOMITING: 0
COUGH: 0
NAUSEA: 0
FEVER: 0
CHEST TIGHTNESS: 0
EYE DISCHARGE: 0
ARTHRALGIAS: 0
ADENOPATHY: 0
EYE ITCHING: 0
FACIAL SWELLING: 0
HEADACHES: 0
FATIGUE: 0
EYE REDNESS: 0
SHORTNESS OF BREATH: 0
ACTIVITY CHANGE: 0
SINUS PRESSURE: 0
MYALGIAS: 0

## 2019-12-09 NOTE — LETTER
12/9/2019         RE: Lupillo Smith  [id number] [place] [address]        Dear Colleague,    Thank you for referring your patient, Lupillo Smith, to the [facility]. Please see a copy of my visit note below.    SUBJECTIVE:                                                               Lupillo Smith presents today to our Allergy Clinic at [facility] for a new patient visit. .  As you know, he is a 54 year old male with a history of chronic idiopathic urticaria.  He is a 54-year-old male with a history of chronic idiopathic urticaria, previously managed by Dr. Garth Lilly, [facility] allergist.  The patient states that he has a history of hives that started when he was a teenager.  For the first several years, he was on different oral antihistamines, ultimately switched to hydroxyzine.  Depending on symptom control, he would be taking hydroxyzine from once a day to several times a day.  Until his mid-30s, he had more issues even when he was taking hydroxyzine several times a day consistently.  Things got better with age.  For multiple years, he has been taking hydroxyzine 50 mg by mouth once daily.  He is very well controlled on this regimen.  Last time when he had an episode was more than 5 years ago.   Patient Active Problem List   Diagnosis     Prediabetes     Hypothyroidism     Morbid obesity (H)     Urticaria     Hyperlipidemia     Hx of papillary thyroid carcinoma     History of total thyroidectomy       Past Medical History:   Diagnosis Date     Hx of thyroid cancer 2013     Urticaria       *Family history is unknown by patient.        Past Surgical History:   Procedure Laterality Date     C ANESTH,DX ARTHROSCOPIC PROC KNEE JOINT Right 04/1985     JOINT REPLACEMENT Left 10/2013    Left hip replacement     ROTATOR CUFF REPAIR RT/LT Right     9/2002- Right shoulder     THYROIDECTOMY  2013     Social History     Socioeconomic History     Marital status:       Spouse name: None     Number of children: None     Years of education: None     Highest education level: None   Occupational History     None   Social Needs     Financial resource strain: None     Food insecurity:     Worry: None     Inability: None     Transportation needs:     Medical: None     Non-medical: None   Tobacco Use     Smoking status: Current Some Day Smoker     Types: Cigars     Smokeless tobacco: Never Used     Tobacco comment: One cigar every three months   Substance and Sexual Activity     Alcohol use: Yes     Comment: rare     Drug use: No     Sexual activity: Yes   Lifestyle     Physical activity:     Days per week: None     Minutes per session: None     Stress: None   Relationships     Social connections:     Talks on phone: None     Gets together: None     Attends Mosque service: None     Active member of club or organization: None     Attends meetings of clubs or organizations: None     Relationship status: None     Intimate partner violence:     Fear of current or ex partner: None     Emotionally abused: None     Physically abused: None     Forced sexual activity: None   Other Topics Concern     None   Social History Narrative    December 9, 2019    ENVIRONMENTAL HISTORY: [place] is heated with a forced air, wood stove and gas fireplace. They do have central air conditioning. The patient's bedroom is furnished with carpeting in bedroom and fabric window coverings.  Pets inside the house include 2 dog(s). There is no history of cockroach or mice infestation. There is/are 0 smokers in the house.  The house does not have a damp basement.            Review of Systems   Constitutional: Negative for activity change, fatigue and fever.   HENT: Negative for congestion, dental problem, ear pain, facial swelling, nosebleeds, postnasal drip, rhinorrhea, sinus pressure and sneezing.    Eyes: Negative for discharge, redness and itching.    Respiratory: Negative for cough, chest tightness, shortness of breath and wheezing.    Cardiovascular: Negative for chest pain.   Gastrointestinal: Negative for diarrhea, nausea and vomiting.   Musculoskeletal: Negative for arthralgias, joint swelling and myalgias.   Skin: Negative for rash.        Hx of urticaria- not currently active at this time   Neurological: Negative for headaches.   Hematological: Negative for adenopathy.   Psychiatric/Behavioral: Negative for behavioral problems and self-injury.           Current Outpatient Medications:      hydrOXYzine (ATARAX) 50 MG tablet, Take 1 tablet (50 mg) by mouth every 6 hours as needed for itching, Disp: 120 tablet, Rfl: 3     levothyroxine (SYNTHROID/LEVOTHROID) 137 MCG tablet, TAKE TWO TABLETS BY MOUTH DAILY , Disp: , Rfl:      metFORMIN (GLUCOPHAGE-XR) 750 MG 24 hr tablet, Take 1,500 mg by mouth, Disp: , Rfl:      Blood Pressure Monitor KIT, 1 Device daily (Patient not taking: Reported on 6/4/2019), Disp: 1 kit, Rfl: 1     Blood Pressure Monitor KIT, 1 Device daily (Patient not taking: Reported on 6/4/2019), Disp: 1 kit, Rfl: 1    There is no immunization history on file for this patient.  Allergies   Allergen Reactions     Oxycodone Nausea and Vomiting     Aspirin Hives     Ibuprofen Hives     Nsaids Swelling     OBJECTIVE:                                                                 BP (!) 143/95 (BP Location: Left arm, Patient Position: Sitting, Cuff Size: Adult Regular)   Pulse 72   Temp 98  F (36.7  C) (Tympanic)   Wt (!) 154.9 kg (341 lb 7.9 oz)   SpO2 95%   BMI 45.36 kg/m           Physical Exam  Vitals signs and nursing note reviewed.   Constitutional:       General: He is not in acute distress.     Appearance: He is obese. He is not diaphoretic.   HENT:      Head: Normocephalic and atraumatic.      Right Ear: Tympanic membrane, ear canal and external ear normal.      Left Ear: Tympanic membrane, ear canal and external ear normal.      Nose:  No mucosal edema or rhinorrhea.      Right Turbinates: Not enlarged, swollen or pale.      Left Turbinates: Not enlarged, swollen or pale.      Mouth/Throat:      Lips: Pink.      Mouth: Mucous membranes are moist.      Pharynx: Oropharynx is clear. No pharyngeal swelling, oropharyngeal exudate or posterior oropharyngeal erythema.   Eyes:      General:         Right eye: No discharge.         Left eye: No discharge.      Conjunctiva/sclera: Conjunctivae normal.   Cardiovascular:      Heart sounds: Normal heart sounds.   Pulmonary:      Effort: No respiratory distress.      Breath sounds: Normal breath sounds and air entry. No stridor, decreased air movement or transmitted upper airway sounds. No decreased breath sounds, wheezing, rhonchi or rales.   Musculoskeletal: Normal range of motion.   Lymphadenopathy:      Cervical: No cervical adenopathy.   Skin:     General: Skin is warm.      Capillary Refill: Capillary refill takes less than 2 seconds.      Findings: No rash.   Neurological:      Mental Status: He is alert.   Psychiatric:         Mood and Affect: Mood normal.         Behavior: Behavior normal.           ASSESSMENT/PLAN:      Problem List Items Addressed This Visit     None      Visit Diagnoses     Chronic idiopathic urticaria    -  Primary  Historical diagnosis.  He has not had symptoms for at at least 5 years or even more.   Discussed that some of the chronic idiopathic urticaria patients can be completely asymptomatic for a prolonged time without any meds.  -I suggest trying to wean off hydroxyzine slowly, increasing the gaps, to every other day, every 2 days, every 3 days, etc.  He will send us a message via my chart to let us know if it worked out.    Relevant Medications    hydrOXYzine (ATARAX) 50 MG tablet        Return in about 1 year (around 12/9/2020), or if symptoms worsen or fail to improve.    Thank you for allowing us to participate in the care of this patient. Please feel free to contact us  if there are any questions or concerns about the patient.    Disclaimer: This note consists of symbols derived from keyboarding, dictation and/or voice recognition software. As a result, there may be errors in the script that have gone undetected. Please consider this when interpreting information found in this chart.    Corey Welsh MD, FAAAAI, FACAAI  Allergy, Asthma and Immunology  [place], [place] and [place]      Again, thank you for allowing me to participate in the care of your patient.        Sincerely,        Corey Welsh MD

## 2019-12-09 NOTE — PROGRESS NOTES
SUBJECTIVE:                                                               Lupillo Smith presents today to our Allergy Clinic at [facility] for a new patient visit. .  As you know, he is a 54 year old male with a history of chronic idiopathic urticaria.  He is a 54-year-old male with a history of chronic idiopathic urticaria, previously managed by Dr. Garth Lilly, [facility] allergist.  The patient states that he has a history of hives that started when he was a teenager.  For the first several years, he was on different oral antihistamines, ultimately switched to hydroxyzine.  Depending on symptom control, he would be taking hydroxyzine from once a day to several times a day.  Until his mid-30s, he had more issues even when he was taking hydroxyzine several times a day consistently.  Things got better with age.  For multiple years, he has been taking hydroxyzine 50 mg by mouth once daily.  He is very well controlled on this regimen.  Last time when he had an episode was more than 5 years ago.   Patient Active Problem List   Diagnosis     Prediabetes     Hypothyroidism     Morbid obesity (H)     Urticaria     Hyperlipidemia     Hx of papillary thyroid carcinoma     History of total thyroidectomy       Past Medical History:   Diagnosis Date     Hx of thyroid cancer 2013     Urticaria       *Family history is unknown by patient.        Past Surgical History:   Procedure Laterality Date     C ANESTH,DX ARTHROSCOPIC PROC KNEE JOINT Right 04/1985     JOINT REPLACEMENT Left 10/2013    Left hip replacement     ROTATOR CUFF REPAIR RT/LT Right     9/2002- Right shoulder     THYROIDECTOMY  2013     Social History     Socioeconomic History     Marital status:      Spouse name: None     Number of children: None     Years of education: None     Highest education level: None   Occupational History     None   Social Needs     Financial resource strain: None     Food insecurity:     Worry: None      Inability: None     Transportation needs:     Medical: None     Non-medical: None   Tobacco Use     Smoking status: Current Some Day Smoker     Types: Cigars     Smokeless tobacco: Never Used     Tobacco comment: One cigar every three months   Substance and Sexual Activity     Alcohol use: Yes     Comment: rare     Drug use: No     Sexual activity: Yes   Lifestyle     Physical activity:     Days per week: None     Minutes per session: None     Stress: None   Relationships     Social connections:     Talks on phone: None     Gets together: None     Attends Am[employer] service: None     Active member of club or organization: None     Attends meetings of clubs or organizations: None     Relationship status: None     Intimate partner violence:     Fear of current or ex partner: None     Emotionally abused: None     Physically abused: None     Forced sexual activity: None   Other Topics Concern     None   Social History Narrative    December 9, 2019    ENVIRONMENTAL HISTORY: [place] The home is heated with a forced air, wood stove and gas fireplace. They do have central air conditioning. The patient's bedroom is furnished with carpeting in bedroom and fabric window coverings.  Pets inside the house include 2 dog(s). There is no history of cockroach or mice infestation. There is/are 0 smokers in the house.  The house does not have a damp basement.            Review of Systems   Constitutional: Negative for activity change, fatigue and fever.   HENT: Negative for congestion, dental problem, ear pain, facial swelling, nosebleeds, postnasal drip, rhinorrhea, sinus pressure and sneezing.    Eyes: Negative for discharge, redness and itching.   Respiratory: Negative for cough, chest tightness, shortness of breath and wheezing.    Cardiovascular: Negative for chest pain.   Gastrointestinal: Negative for diarrhea, nausea and vomiting.   Musculoskeletal: Negative for arthralgias, joint  swelling and myalgias.   Skin: Negative for rash.        Hx of urticaria- not currently active at this time   Neurological: Negative for headaches.   Hematological: Negative for adenopathy.   Psychiatric/Behavioral: Negative for behavioral problems and self-injury.           Current Outpatient Medications:      hydrOXYzine (ATARAX) 50 MG tablet, Take 1 tablet (50 mg) by mouth every 6 hours as needed for itching, Disp: 120 tablet, Rfl: 3     levothyroxine (SYNTHROID/LEVOTHROID) 137 MCG tablet, TAKE TWO TABLETS BY MOUTH DAILY , Disp: , Rfl:      metFORMIN (GLUCOPHAGE-XR) 750 MG 24 hr tablet, Take 1,500 mg by mouth, Disp: , Rfl:      Blood Pressure Monitor KIT, 1 Device daily (Patient not taking: Reported on 6/4/2019), Disp: 1 kit, Rfl: 1     Blood Pressure Monitor KIT, 1 Device daily (Patient not taking: Reported on 6/4/2019), Disp: 1 kit, Rfl: 1    There is no immunization history on file for this patient.  Allergies   Allergen Reactions     Oxycodone Nausea and Vomiting     Aspirin Hives     Ibuprofen Hives     Nsaids Swelling     OBJECTIVE:                                                                 BP (!) 143/95 (BP Location: Left arm, Patient Position: Sitting, Cuff Size: Adult Regular)   Pulse 72   Temp 98  F (36.7  C) (Tympanic)   Wt (!) 154.9 kg (341 lb 7.9 oz)   SpO2 95%   BMI 45.36 kg/m          Physical Exam  Vitals signs and nursing note reviewed.   Constitutional:       General: He is not in acute distress.     Appearance: He is obese. He is not diaphoretic.   HENT:      Head: Normocephalic and atraumatic.      Right Ear: Tympanic membrane, ear canal and external ear normal.      Left Ear: Tympanic membrane, ear canal and external ear normal.      Nose: No mucosal edema or rhinorrhea.      Right Turbinates: Not enlarged, swollen or pale.      Left Turbinates: Not enlarged, swollen or pale.      Mouth/Throat:      Lips: Pink.      Mouth: Mucous membranes are moist.      Pharynx: Oropharynx is  clear. No pharyngeal swelling, oropharyngeal exudate or posterior oropharyngeal erythema.   Eyes:      General:         Right eye: No discharge.         Left eye: No discharge.      Conjunctiva/sclera: Conjunctivae normal.   Cardiovascular:      Heart sounds: Normal heart sounds.   Pulmonary:      Effort: No respiratory distress.      Breath sounds: Normal breath sounds and air entry. No stridor, decreased air movement or transmitted upper airway sounds. No decreased breath sounds, wheezing, rhonchi or rales.   Musculoskeletal: Normal range of motion.   Lymphadenopathy:      Cervical: No cervical adenopathy.   Skin:     General: Skin is warm.      Capillary Refill: Capillary refill takes less than 2 seconds.      Findings: No rash.   Neurological:      Mental Status: He is alert.   Psychiatric:         Mood and Affect: Mood normal.         Behavior: Behavior normal.           ASSESSMENT/PLAN:      Problem List Items Addressed This Visit     None      Visit Diagnoses     Chronic idiopathic urticaria    -  Primary  Historical diagnosis.  He has not had symptoms for at at least 5 years or even more.   Discussed that some of the chronic idiopathic urticaria patients can be completely asymptomatic for a prolonged time without any meds.  -I suggest trying to wean off hydroxyzine slowly, increasing the gaps, to every other day, every 2 days, every 3 days, etc.  He will send us a message via my chart to let us know if it worked out.    Relevant Medications    hydrOXYzine (ATARAX) 50 MG tablet        Return in about 1 year (around 12/9/2020), or if symptoms worsen or fail to improve.    Thank you for allowing us to participate in the care of this patient. Please feel free to contact us if there are any questions or concerns about the patient.    Disclaimer: This note consists of symbols derived from keyboarding, dictation and/or voice recognition software. As a result, there may be errors in the script that have gone  undetected. Please consider this when interpreting information found in this chart.    Corey Welsh MD, FAAAAI, FACAAI  Allergy, Asthma and Immunology  [place], [place] and [place]

## 2019-12-30 ENCOUNTER — TELEPHONE (OUTPATIENT)
Dept: ENDOCRINOLOGY | Facility: CLINIC | Age: 54
End: 2019-12-30

## 2019-12-30 ENCOUNTER — OFFICE VISIT (OUTPATIENT)
Dept: ENDOCRINOLOGY | Facility: CLINIC | Age: 54
End: 2019-12-30
Attending: PHYSICIAN ASSISTANT
Payer: COMMERCIAL

## 2019-12-30 VITALS
OXYGEN SATURATION: 98 % | DIASTOLIC BLOOD PRESSURE: 86 MMHG | SYSTOLIC BLOOD PRESSURE: 143 MMHG | BODY MASS INDEX: 41.75 KG/M2 | HEIGHT: 73 IN | HEART RATE: 83 BPM | WEIGHT: 315 LBS

## 2019-12-30 DIAGNOSIS — R73.03 PREDIABETES: ICD-10-CM

## 2019-12-30 DIAGNOSIS — Z85.850 HX OF PAPILLARY THYROID CARCINOMA: ICD-10-CM

## 2019-12-30 DIAGNOSIS — E03.9 HYPOTHYROIDISM, UNSPECIFIED TYPE: Primary | ICD-10-CM

## 2019-12-30 DIAGNOSIS — E66.01 MORBID OBESITY (H): ICD-10-CM

## 2019-12-30 LAB
HBA1C MFR BLD: 5.7 % (ref 0–5.6)
TSH SERPL DL<=0.005 MIU/L-ACNC: 0.61 MU/L (ref 0.4–4)

## 2019-12-30 PROCEDURE — 84443 ASSAY THYROID STIM HORMONE: CPT | Performed by: INTERNAL MEDICINE

## 2019-12-30 PROCEDURE — 86800 THYROGLOBULIN ANTIBODY: CPT | Performed by: INTERNAL MEDICINE

## 2019-12-30 PROCEDURE — 36415 COLL VENOUS BLD VENIPUNCTURE: CPT | Performed by: INTERNAL MEDICINE

## 2019-12-30 PROCEDURE — 84432 ASSAY OF THYROGLOBULIN: CPT | Performed by: INTERNAL MEDICINE

## 2019-12-30 PROCEDURE — 99204 OFFICE O/P NEW MOD 45 MIN: CPT | Performed by: INTERNAL MEDICINE

## 2019-12-30 PROCEDURE — 83036 HEMOGLOBIN GLYCOSYLATED A1C: CPT | Performed by: INTERNAL MEDICINE

## 2019-12-30 RX ORDER — METFORMIN HYDROCHLORIDE 750 MG/1
1500 TABLET, EXTENDED RELEASE ORAL
Status: CANCELLED | OUTPATIENT
Start: 2019-12-30

## 2019-12-30 RX ORDER — LEVOTHYROXINE SODIUM 137 UG/1
274 TABLET ORAL DAILY
Status: CANCELLED | OUTPATIENT
Start: 2019-12-30

## 2019-12-30 ASSESSMENT — MIFFLIN-ST. JEOR: SCORE: 2450.65

## 2019-12-30 NOTE — PROGRESS NOTES
CC: Hypothyroidism.    HPI: Patient presents for management of hypothyroidism.  Found to have nodules in 2000. Biopsies in 2000 and 2007 where benign.   However, as his thyroid kept growing so he went to surgery.     Surgery on 7/15/13:  DIAGNOSIS:   Thyroid, total thyroidectomy:     PROCEDURE:                         Total thyroidectomy   RECEIVED:                          In formalin   SPECIMEN INTEGRITY:                Intact   SPECIMEN SIZE:    Right lobe:                       13 x 8.5 x 7.5 cm    Left lobe:                        5.8 x 5 x 2.5 cm     SPECIMEN WEIGHT:                   337 g   TUMOR FOCALITY:                    Unifocal     DOMINANT TUMOR:   TUMOR LATERALITY:                  Left lobe   TUMOR SIZE:    Greatest dimension:               0.5 cm   HISTOLOGIC TYPE:                   Papillary carcinoma    Variant, specify:                 Follicular variant    Architecture:                     Follicular    Cytomorphology:                   Classical   HISTOLOGIC GRADE:                  G1: Well differentiated   MARGINS:                           Margins uninvolved by carcinoma    Distance of invasive carcinoma to    closest margin:                   6 mm   TUMOR CAPSULE:                     Partially encapsulated   TUMOR CAPSULAR INVASION:           Not identified   LYMPH-VASCULAR INVASION:           Not identified   EXTRATHYROIDAL EXTENSION:          Not identified     PATHOLOGIC STAGING   PRIMARY TUMOR:                     pT1: Tumor size 2 cm or less,                                      limited to thyroid   REGIONAL LYMPH NODES:              pNX: Regional lymph nodes cannot be                                      assessed    Number examined:                  0    Number involved:                  N/A     ADDITIONAL PATHOLOGIC FINDINGS:    Adenomatous nodule(s) or Nodular                                      follicular disease (eg, nodular                                      hyperplasia,  "goitrous thyroid).                                      Thryoiditis.     MCSS: I    No dysphagia. No recent change to levothyroxine dose.     He carries a diagnosis of pre-diabetes and was placed on metformin by Dr Weiss.   He had begun to loose weight. Started exercising in 7/2019. Needs to loose weight prior to hip surgery.   He has been meeting with a nutritionist regularly as well. Tracking food intake on My Fitness Pal. Goal of 2770 a day or less.     Works at [employer]. 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     ROS: 10 point ROS neg other than the symptoms noted above in the HPI.    PMH:   Patient Active Problem List   Diagnosis     Prediabetes     Hypothyroidism     Morbid obesity (H)     Urticaria     Hyperlipidemia     Hx of papillary thyroid carcinoma     History of total thyroidectomy     Meds:  Current Outpatient Medications   Medication     Blood Pressure Monitor KIT     Blood Pressure Monitor KIT     hydrOXYzine (ATARAX) 50 MG tablet     levothyroxine (SYNTHROID/LEVOTHROID) 137 MCG tablet     metFORMIN (GLUCOPHAGE-XR) 750 MG 24 hr tablet     No current facility-administered medications for this visit.      FHX:   No thyroid disease.   Grandmother had DM.   Father has DM.     SHX:  Rare cigar.     Exam:   Vital signs:      BP: (!) 143/86 Pulse: 83     SpO2: 98 %     Height: 185.4 cm (6' 1\") Weight: (!) 154.7 kg (341 lb)  Estimated body mass index is 44.99 kg/m  as calculated from the following:    Height as of this encounter: 1.854 m (6' 1\").    Weight as of this encounter: 154.7 kg (341 lb).  Gen: In NAD.   HEENT: no proptosis or lid lag, EOMI, no palpable thyroid tissue.  Card: S1 S2 RRR no m/r/g.+1 LE edema.   Pulm: CTA b/l.   GI: NT ND +BS.   MSK: no gross deformities.   Derm: no rashes or lesions.   Neuro: no tremor, +2 DTR's.     A/P:   PTC - Follicular variant. Outside records reviewed. pT1pNX, 7/13/15. No I 131.   TSH - at goal in 1/2019.   Tg - negative with negative Ab's in 1/2019.   US 1/2019: Small, " normal-appearing lymph nodes are seen in the left and right neck. None have microcystic change or calcification.    -Labs today. If Tg remains undetectable, next ultrasound in 2024.     Obesity - BMR 2900. Issues include lack of structured diet and exercise until 7/2019. He does feel he needs an appetite suppressant at this time.   -Continue your diet and exercise program.     IFG - HbA1C 6.2% in 1/2019. He has lost weight since this time. Discussed continued metformin use. Discussed reports about GLP-1 and DDP-4 inhibitor with pancreatitis and pancreatic cancer. Although I cannot definitively say the patient will not get pancreatitis or pancreatic cancer, to date there is no  conclusive evidence of a causal link with these agents for pancreatic cancer. There is a slight increase in  risk of pancreatitis but overall risk still low. Persons with DM tend to have more pancreatitis and  pancreatic cancer de henry.  -Check HbA1C today.   -Continue your diet and exercise program.   -Possible GLP-1 agonist in the future.       Bobby Hughes MD on 12/30/2019 at 1:45 PM

## 2019-12-30 NOTE — PATIENT INSTRUCTIONS
-Labs today. If Tg remains undetectable, next ultrasound in 2024.     -Check HbA1C today.   -Continue your diet and exercise program.   -Possible victoza or ozempic in the future.

## 2019-12-30 NOTE — LETTER
12/30/2019         RE: Lupillo Smith  [address] [place] [address]        Dear Colleague,    Thank you for referring your patient, Lupillo Smith, to the [facility]. Please see a copy of my visit note below.    CC: Hypothyroidism.    HPI: Patient presents for management of hypothyroidism.  Found to have nodules in 2000. Biopsies in 2000 and 2007 where benign.   However, as his thyroid kept growing so he went to surgery.     Surgery on 7/15/13:  DIAGNOSIS:   Thyroid, total thyroidectomy:     PROCEDURE:                         Total thyroidectomy   RECEIVED:                          In formalin   SPECIMEN INTEGRITY:                Intact   SPECIMEN SIZE:    Right lobe:                       13 x 8.5 x 7.5 cm    Left lobe:                        5.8 x 5 x 2.5 cm     SPECIMEN WEIGHT:                   337 g   TUMOR FOCALITY:                    Unifocal     DOMINANT TUMOR:   TUMOR LATERALITY:                  Left lobe   TUMOR SIZE:    Greatest dimension:               0.5 cm   HISTOLOGIC TYPE:                   Papillary carcinoma    Variant, specify:                 Follicular variant    Architecture:                     Follicular    Cytomorphology:                   Classical   HISTOLOGIC GRADE:                  G1: Well differentiated   MARGINS:                           Margins uninvolved by carcinoma    Distance of invasive carcinoma to    closest margin:                   6 mm   TUMOR CAPSULE:                     Partially encapsulated   TUMOR CAPSULAR INVASION:           Not identified   LYMPH-VASCULAR INVASION:           Not identified   EXTRATHYROIDAL EXTENSION:          Not identified     PATHOLOGIC STAGING   PRIMARY TUMOR:                     pT1: Tumor size 2 cm or less,                                      limited to thyroid   REGIONAL LYMPH NODES:              pNX: Regional lymph nodes cannot be                                      assessed    Number  "examined:                  0    Number involved:                  N/A     ADDITIONAL PATHOLOGIC FINDINGS:    Adenomatous nodule(s) or Nodular                                      follicular disease (eg, nodular                                      hyperplasia, goitrous thyroid).                                      Thryoiditis.     MCSS: I    No dysphagia. No recent change to levothyroxine dose.     He carries a diagnosis of pre-diabetes and was placed on metformin by Dr Weiss.   He had begun to loose weight. Started exercising in 7/2019. Needs to loose weight prior to hip surgery.   He has been meeting with a nutritionist regularly as well. Tracking food intake on My Fitness Pal. Goal of 2770 a day or less.     Works at [employer]. 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     ROS: 10 point ROS neg other than the symptoms noted above in the HPI.    PMH:   Patient Active Problem List   Diagnosis     Prediabetes     Hypothyroidism     Morbid obesity (H)     Urticaria     Hyperlipidemia     Hx of papillary thyroid carcinoma     History of total thyroidectomy     Meds:  Current Outpatient Medications   Medication     Blood Pressure Monitor KIT     Blood Pressure Monitor KIT     hydrOXYzine (ATARAX) 50 MG tablet     levothyroxine (SYNTHROID/LEVOTHROID) 137 MCG tablet     metFORMIN (GLUCOPHAGE-XR) 750 MG 24 hr tablet     No current facility-administered medications for this visit.      FHX:   No thyroid disease.   Grandmother had DM.   Father has DM.     SHX:  Rare cigar.     Exam:   Vital signs:      BP: (!) 143/86 Pulse: 83     SpO2: 98 %     Height: 185.4 cm (6' 1\") Weight: (!) 154.7 kg (341 lb)  Estimated body mass index is 44.99 kg/m  as calculated from the following:    Height as of this encounter: 1.854 m (6' 1\").    Weight as of this encounter: 154.7 kg (341 lb).  Gen: In NAD.   HEENT: no proptosis or lid lag, EOMI, no palpable thyroid tissue.  Card: S1 S2 RRR no m/r/g.+1 LE edema.   Pulm: CTA b/l.   GI: NT ND +BS.   MSK: no " gross deformities.   Derm: no rashes or lesions.   Neuro: no tremor, +2 DTR's.     A/P:   PTC - Follicular variant. Outside records reviewed. pT1pNX, 7/13/15. No I 131.   TSH - at goal in 1/2019.   Tg - negative with negative Ab's in 1/2019.   US 1/2019: Small, normal-appearing lymph nodes are seen in the left and right neck. None have microcystic change or calcification.    -Labs today. If Tg remains undetectable, next ultrasound in 2024.     Obesity - BMR 2900. Issues include lack of structured diet and exercise until 7/2019. He does feel he needs an appetite suppressant at this time.   -Continue your diet and exercise program.     IFG - HbA1C 6.2% in 1/2019. He has lost weight since this time. Discussed continued metformin use. Discussed reports about GLP-1 and DDP-4 inhibitor with pancreatitis and pancreatic cancer. Although I cannot definitively say the patient will not get pancreatitis or pancreatic cancer, to date there is no  conclusive evidence of a causal link with these agents for pancreatic cancer. There is a slight increase in  risk of pancreatitis but overall risk still low. Persons with DM tend to have more pancreatitis and  pancreatic cancer de henry.  -Check HbA1C today.   -Continue your diet and exercise program.   -Possible GLP-1 agonist in the future.       Bobby Hughes MD on 12/30/2019 at 1:45 PM          Again, thank you for allowing me to participate in the care of your patient.        Sincerely,        Bobby Hughes MD

## 2019-12-30 NOTE — TELEPHONE ENCOUNTER
Reason for Call:  Medication or medication refill:    Do you use a [facility]?  Name of the pharmacy and phone number for the current request:  [facility] [phone]    Name of the medication requested: Levothyroxine; Metformin    Other request: Pt was seen today and forgot to request these refills    Can we leave a detailed message on this number? Not Applicable    Phone number patient can be reached at: Home number on file [phone] (home)    Best Time: NA    Call taken on 12/30/2019 at 2:49 PM by Denise Behrendt

## 2019-12-31 DIAGNOSIS — E03.9 HYPOTHYROIDISM, UNSPECIFIED TYPE: Primary | ICD-10-CM

## 2019-12-31 DIAGNOSIS — R73.03 PREDIABETES: ICD-10-CM

## 2019-12-31 DIAGNOSIS — Z85.850 HX OF PAPILLARY THYROID CARCINOMA: ICD-10-CM

## 2019-12-31 DIAGNOSIS — R73.03 PREDIABETES: Primary | ICD-10-CM

## 2019-12-31 LAB
THYROGLOB AB SERPL IA-ACNC: <20 IU/ML (ref 0–40)
THYROGLOB SERPL-MCNC: <0.2 NG/ML

## 2019-12-31 RX ORDER — METFORMIN HYDROCHLORIDE 750 MG/1
1500 TABLET, EXTENDED RELEASE ORAL DAILY
Qty: 180 TABLET | Refills: 3 | Status: SHIPPED | OUTPATIENT
Start: 2019-12-31 | End: 2020-12-15

## 2019-12-31 RX ORDER — LEVOTHYROXINE SODIUM 137 UG/1
274 TABLET ORAL DAILY
Qty: 180 TABLET | Refills: 3 | Status: SHIPPED | OUTPATIENT
Start: 2019-12-31 | End: 2020-12-15

## 2020-03-01 ENCOUNTER — HEALTH MAINTENANCE LETTER (OUTPATIENT)
Age: 55
End: 2020-03-01

## 2020-06-08 ENCOUNTER — ANCILLARY PROCEDURE (OUTPATIENT)
Dept: GENERAL RADIOLOGY | Facility: CLINIC | Age: 55
End: 2020-06-08
Attending: ORTHOPAEDIC SURGERY
Payer: COMMERCIAL

## 2020-06-08 ENCOUNTER — OFFICE VISIT (OUTPATIENT)
Dept: ORTHOPEDICS | Facility: CLINIC | Age: 55
End: 2020-06-08
Payer: COMMERCIAL

## 2020-06-08 VITALS
WEIGHT: 315 LBS | HEART RATE: 80 BPM | BODY MASS INDEX: 41.75 KG/M2 | SYSTOLIC BLOOD PRESSURE: 142 MMHG | HEIGHT: 73 IN | DIASTOLIC BLOOD PRESSURE: 95 MMHG

## 2020-06-08 DIAGNOSIS — M16.11 PRIMARY OSTEOARTHRITIS OF RIGHT HIP: Primary | ICD-10-CM

## 2020-06-08 DIAGNOSIS — M25.551 HIP PAIN, RIGHT: ICD-10-CM

## 2020-06-08 DIAGNOSIS — M25.551 CHRONIC RIGHT HIP PAIN: ICD-10-CM

## 2020-06-08 DIAGNOSIS — G89.29 CHRONIC RIGHT HIP PAIN: ICD-10-CM

## 2020-06-08 PROCEDURE — 73502 X-RAY EXAM HIP UNI 2-3 VIEWS: CPT

## 2020-06-08 PROCEDURE — 99243 OFF/OP CNSLTJ NEW/EST LOW 30: CPT | Performed by: ORTHOPAEDIC SURGERY

## 2020-06-08 ASSESSMENT — MIFFLIN-ST. JEOR: SCORE: 2457.89

## 2020-06-08 ASSESSMENT — PAIN SCALES - GENERAL: PAINLEVEL: SEVERE PAIN (7)

## 2020-06-08 NOTE — LETTER
"    6/8/2020         RE: Lupillo Smtih  [address] [place] [address]        Dear Colleague,    Thank you for referring your patient, Lupillo Smith, to the [facility] [place]. Please see a copy of my visit note below.    Chief Complaint:   Chief Complaint   Patient presents with     Right Hip - Pain     Onset: years. Patient notes the pain has been getting worse. He was being seen at [facility]. He is here because his BMI requirements didn't meet theirs. He has had an intra articular hip injection, lasting 4-5 months in June 2019.     Hip Pain     Pain is in the groin, when bending over it is horrible. Pain is over the entire hip area and upper leg area. Hx of left BERNARDO in 2013.     Lupillo Smith is seen today in the [facility] for evaluation of right hip pain at the request of Renae Crockett PA-C      HISTORY OF PRESENT ILLNESS    Lupillo Smith is a 55 year old male seen for evaluation of ongoing right hip pain with no known injury.   Pain has been present for years, but getting worse. Locates pain to the groin area, hip and upper thigh. Stiff when up on feet for an hour or more. Aggravated with bending, in/out of car, donning shoes/socks, getting up out of a chair. Treatment has been intra-articular hip injections, lasting 4-5 months, most recent a year ago 6/2019. He's been followed by [facility] but doesn't meet their BMI requirements. Takes tramadol on occasion for the pain.    Denies low back pain, occasional stiffness. Does get pain radiating to the knee \"from the hip\", but temporary. Denies numbness and tingling.    Left total hip arthroplasty 2013 with Dr Bob MCDONNELL, doing well.    Present symptoms: moderate pain, severe pain.    Pain severity: 7/10  Pain quality: aching  Frequency of symptoms: are constant  Exacerbating Factors: weight bearing, [id number], in and out of car  Relieving Factors: laying " flat  Night Pain: Yes  Pain while at rest: Yes   Associated numbness or tingling: No     Orthopedic PMH: left total hip arthroplasty 2013.    Other PMH:  has a past medical history of thyroid cancer (2013) and Urticaria.  Patient Active Problem List   Diagnosis     Prediabetes     Hypothyroidism     Morbid obesity (H)     Urticaria     Hyperlipidemia     Hx of papillary thyroid carcinoma     History of total thyroidectomy       Surgical Hx:  has a past surgical history that includes Thyroidectomy (2013); joint replacement (Left, 10/2013); rotator cuff repair rt/lt (Right); and ANESTH,DX ARTHROSCOPIC PROC KNEE JOINT (Right, 04/1985).    Medications:   Current Outpatient Medications:      hydrOXYzine (ATARAX) 50 MG tablet, Take 1 tablet (50 mg) by mouth every 6 hours as needed for itching, Disp: 120 tablet, Rfl: 3     levothyroxine (SYNTHROID/LEVOTHROID) 137 MCG tablet, Take 2 tablets (274 mcg) by mouth daily, Disp: 180 tablet, Rfl: 3     metFORMIN (GLUCOPHAGE-XR) 750 MG 24 hr tablet, Take 2 tablets (1,500 mg) by mouth daily, Disp: 180 tablet, Rfl: 3     Blood Pressure Monitor KIT, 1 Device daily (Patient not taking: Reported on 6/8/2020), Disp: 1 kit, Rfl: 1     Blood Pressure Monitor KIT, 1 Device daily (Patient not taking: Reported on 6/8/2020), Disp: 1 kit, Rfl: 1    Allergies:   Allergies   Allergen Reactions     Oxycodone Nausea and Vomiting     Aspirin Hives     Ibuprofen Hives     Nsaids Swelling       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  reports that he has been smoking cigars. He has never used smokeless tobacco. He reports current alcohol use. He reports that he does not use drugs.    Family Hx: Family history is unknown by patient.    REVIEW OF SYSTEMS:  10 point ROS neg other than the symptoms noted above in the HPI and PAST MEDICAL HISTORY. Notables,  CONSTITUTIONAL:NEGATIVE for fever, chills, change in weight  INTEGUMENTARY/SKIN: NEGATIVE for worrisome rashes, moles or  "lesions  MUSCULOSKELETAL:See HPI above  NEURO: NEGATIVE for weakness, dizziness or paresthesias    PHYSICAL EXAM:  BP (!) 142/95   Pulse 80   Ht 1.854 m (6' 1\")   Wt (!) 155.9 kg (343 lb 11.2 oz)   BMI 45.35 kg/m     GENERAL APPEARANCE: healthy, alert, no distress; obese habitus.  SKIN: no suspicious lesions or rashes  NEURO: Normal strength and tone, mentation intact and speech normal  PSYCH:  mentation appears normal and affect normal  RESPIRATORY: No increased work of breathing.  LYMPH: no palpable inguinal lymph nodes.  Fairly mobile pannus.    BILATERAL LOWER EXTREMITIES:  Gait: Trendelenburg right   No gross deformities or masses.  No Quad atrophy, strength normal.  Intact sensation deep peroneal nerve, superficial peroneal nerve, med/lat tibial nerve, sural nerve, saphenous nerve  Intact EHL, EDL, TA, FHL, GS, quadriceps hamstrings and hip flexors  Toes warm and well perfused, brisk capillary refill. Palpable 2+ dp pulses.  Bilateral calf soft and nttp or squeeze.  DTRs: achilles 2+, patella 2+.  Edema: 1+  Leg lengths: grossly symmetric at medial malleolus.      BACK EXAM  Lumbar range of motion: flexion to touch knees and causes anterior groin pain, extension adequate, side bend: adequate  Seated SLR: negative , bilateral.  Supine SLR: negative , bilateral.  Sensation:normal, bilateral.  Sciatic Notch tenderness: negative    LEFT HIP EXAM:      Palpation: Tender:   None.  Hip range of motion: limited by habitus, pain-free.      RIGHT HIP EXAM:    Palpation: Tender:   none  Strength:  4/5  Special tests:  Irritability (flexion/adduction/internal rotation) positive   Hip range of motion: Internal rotation: 5, External rotation: 25, Flexion 45, limited by discomfort and habitus.        X-RAY: AP pelvis and AP/Lateral views right hip from 6/8/2020 were reviewed in clinic today. No obvious fractures or dislocations. Moderate right hip degenerative changes with articular flattening, subchondral cystic " "changes. Left total hip arthroplasty. .        Impression: 54yo male with chronic right hip pain, primary osteoarthritis; obesity.    Plan:   * discussed pain in groin/hip likely from advanced hip arthritis. This is wearing of the cartilage within the hip joint either due to normal \"wear and tear\" or following an injury. Any low back / buttock / radiating pain likely coming from the low back.  *  treatment options for hip arthritis and pain include: do nothing, NSAIDS, activity modification, Physical Therapy, injections, total hip arthroplasty. Risks and benefits of each discussed.   * did discuss patient is a candidate for total hip arthroplasty given symptoms and xray findings, but just not in my practice given current BMI. He'd be looking at weight loss to get to about BMI of 40.  * I advised patient see one of my surgical partners, Dr Tabor or Dr Soares, regarding discussion of possible total hip arthroplasty surgery with either of them.  * he was appreciative of the information today and the visit.  * continue to work on weight loss  * over the counter pain control, activity modification  * return to clinic as needed.            John Rehman M.D., M.S.  Dept. of Orthopaedic Surgery  [facility]            Again, thank you for allowing me to participate in the care of your patient.        Sincerely,        John Rehman MD    "

## 2020-06-08 NOTE — PROGRESS NOTES
"Chief Complaint:   Chief Complaint   Patient presents with     Right Hip - Pain     Onset: years. Patient notes the pain has been getting worse. He was being seen at [facility]. He is here because his BMI requirements didn't meet theirs. He has had an intra articular hip injection, lasting 4-5 months in June 2019.     Hip Pain     Pain is in the groin, when bending over it is horrible. Pain is over the entire hip area and upper leg area. Hx of left BERNARDO in 2013.     Lupillo Smith is seen today in the [facility] for evaluation of right hip pain at the request of Renae Crockett PA-C      HISTORY OF PRESENT ILLNESS    Lupillo Smith is a 55 year old male seen for evaluation of ongoing right hip pain with no known injury.   Pain has been present for years, but getting worse. Locates pain to the groin area, hip and upper thigh. Stiff when up on feet for an hour or more. Aggravated with bending, in/out of car, donning shoes/socks, getting up out of a chair. Treatment has been intra-articular hip injections, lasting 4-5 months, most recent a year ago 6/2019. He's been followed by [facility] but doesn't meet their BMI requirements. Takes tramadol on occasion for the pain.    Denies low back pain, occasional stiffness. Does get pain radiating to the knee \"from the hip\", but temporary. Denies numbness and tingling.    Left total hip arthroplasty 2013 with [facility], Dr Bob Stephenson, doing well.    Present symptoms: moderate pain, severe pain.    Pain severity: 7/10  Pain quality: aching  Frequency of symptoms: are constant  Exacerbating Factors: weight bearing, [id number], in and out of car  Relieving Factors: laying flat  Night Pain: Yes  Pain while at rest: Yes   Associated numbness or tingling: No     Orthopedic PMH: left total hip arthroplasty 2013.    Other PMH:  has a past medical history of thyroid cancer (2013) and Urticaria.  Patient Active Problem List   Diagnosis     " "Prediabetes     Hypothyroidism     Morbid obesity (H)     Urticaria     Hyperlipidemia     Hx of papillary thyroid carcinoma     History of total thyroidectomy       Surgical Hx:  has a past surgical history that includes Thyroidectomy (2013); joint replacement (Left, 10/2013); rotator cuff repair rt/lt (Right); and ANESTH,DX ARTHROSCOPIC PROC KNEE JOINT (Right, 04/1985).    Medications:   Current Outpatient Medications:      hydrOXYzine (ATARAX) 50 MG tablet, Take 1 tablet (50 mg) by mouth every 6 hours as needed for itching, Disp: 120 tablet, Rfl: 3     levothyroxine (SYNTHROID/LEVOTHROID) 137 MCG tablet, Take 2 tablets (274 mcg) by mouth daily, Disp: 180 tablet, Rfl: 3     metFORMIN (GLUCOPHAGE-XR) 750 MG 24 hr tablet, Take 2 tablets (1,500 mg) by mouth daily, Disp: 180 tablet, Rfl: 3     Blood Pressure Monitor KIT, 1 Device daily (Patient not taking: Reported on 6/8/2020), Disp: 1 kit, Rfl: 1     Blood Pressure Monitor KIT, 1 Device daily (Patient not taking: Reported on 6/8/2020), Disp: 1 kit, Rfl: 1    Allergies:   Allergies   Allergen Reactions     Oxycodone Nausea and Vomiting     Aspirin Hives     Ibuprofen Hives     Nsaids Swelling       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  reports that he has been smoking cigars. He has never used smokeless tobacco. He reports current alcohol use. He reports that he does not use drugs.    Family Hx: Family history is unknown by patient.    REVIEW OF SYSTEMS:  10 point ROS neg other than the symptoms noted above in the HPI and PAST MEDICAL HISTORY. Notables,  CONSTITUTIONAL:NEGATIVE for fever, chills, change in weight  INTEGUMENTARY/SKIN: NEGATIVE for worrisome rashes, moles or lesions  MUSCULOSKELETAL:See HPI above  NEURO: NEGATIVE for weakness, dizziness or paresthesias    PHYSICAL EXAM:  BP (!) 142/95   Pulse 80   Ht 1.854 m (6' 1\")   Wt (!) 155.9 kg (343 lb 11.2 oz)   BMI 45.35 kg/m     GENERAL APPEARANCE: healthy, alert, no distress; obese habitus.  SKIN: " "no suspicious lesions or rashes  NEURO: Normal strength and tone, mentation intact and speech normal  PSYCH:  mentation appears normal and affect normal  RESPIRATORY: No increased work of breathing.  LYMPH: no palpable inguinal lymph nodes.  Fairly mobile pannus.    BILATERAL LOWER EXTREMITIES:  Gait: Trendelenburg right   No gross deformities or masses.  No Quad atrophy, strength normal.  Intact sensation deep peroneal nerve, superficial peroneal nerve, med/lat tibial nerve, sural nerve, saphenous nerve  Intact EHL, EDL, TA, FHL, GS, quadriceps hamstrings and hip flexors  Toes warm and well perfused, brisk capillary refill. Palpable 2+ dp pulses.  Bilateral calf soft and nttp or squeeze.  DTRs: achilles 2+, patella 2+.  Edema: 1+  Leg lengths: grossly symmetric at medial malleolus.      BACK EXAM  Lumbar range of motion: flexion to touch knees and causes anterior groin pain, extension adequate, side bend: adequate  Seated SLR: negative , bilateral.  Supine SLR: negative , bilateral.  Sensation:normal, bilateral.  Sciatic Notch tenderness: negative    LEFT HIP EXAM:      Palpation: Tender:   None.  Hip range of motion: limited by habitus, pain-free.      RIGHT HIP EXAM:    Palpation: Tender:   none  Strength:  4/5  Special tests:  Irritability (flexion/adduction/internal rotation) positive   Hip range of motion: Internal rotation: 5, External rotation: 25, Flexion 45, limited by discomfort and habitus.        X-RAY: AP pelvis and AP/Lateral views right hip from 6/8/2020 were reviewed in clinic today. No obvious fractures or dislocations. Moderate right hip degenerative changes with articular flattening, subchondral cystic changes. Left total hip arthroplasty. .        Impression: 54yo male with chronic right hip pain, primary osteoarthritis; obesity.    Plan:   * discussed pain in groin/hip likely from advanced hip arthritis. This is wearing of the cartilage within the hip joint either due to normal \"wear and tear\" " or following an injury. Any low back / buttock / radiating pain likely coming from the low back.  *  treatment options for hip arthritis and pain include: do nothing, NSAIDS, activity modification, Physical Therapy, injections, total hip arthroplasty. Risks and benefits of each discussed.   * did discuss patient is a candidate for total hip arthroplasty given symptoms and xray findings, but just not in my practice given current BMI. He'd be looking at weight loss to get to about BMI of 40.  * I advised patient see one of my surgical partners, Dr Tabor or Dr Soares, regarding discussion of possible total hip arthroplasty surgery with either of them.  * he was appreciative of the information today and the visit.  * continue to work on weight loss  * over the counter pain control, activity modification  * return to clinic as needed.            John Rehman M.D., M.S.  Dept. of Orthopaedic Surgery  [facility]

## 2020-06-15 ENCOUNTER — OFFICE VISIT (OUTPATIENT)
Dept: ORTHOPEDICS | Facility: CLINIC | Age: 55
End: 2020-06-15
Payer: COMMERCIAL

## 2020-06-15 VITALS
WEIGHT: 315 LBS | BODY MASS INDEX: 41.75 KG/M2 | HEART RATE: 76 BPM | SYSTOLIC BLOOD PRESSURE: 142 MMHG | HEIGHT: 73 IN | RESPIRATION RATE: 16 BRPM | DIASTOLIC BLOOD PRESSURE: 82 MMHG

## 2020-06-15 DIAGNOSIS — M16.11 PRIMARY OSTEOARTHRITIS OF RIGHT HIP: ICD-10-CM

## 2020-06-15 DIAGNOSIS — M25.851 RIGHT HIP IMPINGEMENT SYNDROME: Primary | ICD-10-CM

## 2020-06-15 PROCEDURE — 99214 OFFICE O/P EST MOD 30 MIN: CPT | Performed by: ORTHOPAEDIC SURGERY

## 2020-06-15 ASSESSMENT — MIFFLIN-ST. JEOR: SCORE: 2441.11

## 2020-06-15 NOTE — LETTER
"    6/15/2020         RE: Lupillo Smith  [address] [place] [place] [address]        Dear Colleague,    Thank you for referring your patient, Lupillo Smith, to the [facility]. Please see a copy of my visit note below.    Chief Complaint:   Chief Complaint   Patient presents with     Consult For     Right hip.       Lupillo Smith is a 55 year old male seen for evaluation of ongoing right hip pain with no known injury.   Pain has been present for years, but getting worse. Locates pain to the groin area, hip and upper thigh. The hip is stiff when up on feet for an hour or more. Aggravated with bending, in/out of car, donning shoes/socks, getting up out of a chair. Treatment has been intra-articular hip injections, lasting 4-5 months, most recent a year ago 6/2019. He's been followed by [facility] but doesn't meet their BMI requirements for total hip arthroplasty. Takes tramadol on occasion for the pain.  He also saw Dr. John Rehman who felt he might be candidate for total hip arthroplasty, but BMI is too high.  Lupillo Smith wanted another opinion.    Denies low back pain, occasional stiffness. Does get pain radiating to the knee \"from the hip\", but temporary. Denies numbness and tingling.    Left total hip arthroplasty 2013 with [facility], doing well.    Present symptoms: moderate pain, severe pain.    Pain severity: 5/10  Pain quality: aching, constant with occasional sharp shooting pain up to 7/10  Frequency of symptoms: are constant  Exacerbating Factors: weight bearing, [id number], in and out of car, bending over, prolonged standing.  Relieving Factors: laying flat  Night Pain: Yes  Pain while at rest: Yes   Associated numbness or tingling: No     Orthopedic PMH: left total hip arthroplasty 2013.    Other PMH:  has a past medical history of thyroid cancer (2013) and Urticaria.  Patient Active Problem List   Diagnosis     Prediabetes     Hypothyroidism     Morbid " "obesity (H)     Urticaria     Hyperlipidemia     Hx of papillary thyroid carcinoma     History of total thyroidectomy     Primary osteoarthritis of right hip       Surgical Hx:  has a past surgical history that includes Thyroidectomy (2013); joint replacement (Left, 10/2013); rotator cuff repair rt/lt (Right); and ANESTH,DX ARTHROSCOPIC PROC KNEE JOINT (Right, 04/1985).    Medications:   Current Outpatient Medications:      Blood Pressure Monitor KIT, 1 Device daily, Disp: 1 kit, Rfl: 1     Blood Pressure Monitor KIT, 1 Device daily, Disp: 1 kit, Rfl: 1     hydrOXYzine (ATARAX) 50 MG tablet, Take 1 tablet (50 mg) by mouth every 6 hours as needed for itching, Disp: 120 tablet, Rfl: 3     levothyroxine (SYNTHROID/LEVOTHROID) 137 MCG tablet, Take 2 tablets (274 mcg) by mouth daily, Disp: 180 tablet, Rfl: 3     metFORMIN (GLUCOPHAGE-XR) 750 MG 24 hr tablet, Take 2 tablets (1,500 mg) by mouth daily, Disp: 180 tablet, Rfl: 3    Allergies:   Allergies   Allergen Reactions     Oxycodone Nausea and Vomiting     Aspirin Hives     Ibuprofen Hives     Nsaids Swelling       Social Hx: shameka [employer].  reports that he has been smoking cigars. He has never used smokeless tobacco. He reports current alcohol use. He reports that he does not use drugs.    Family Hx: Family history is unknown by patient.    REVIEW OF SYSTEMS:  10 point ROS neg other than the symptoms noted above in the HPI and PAST MEDICAL HISTORY. Notables,  CONSTITUTIONAL:NEGATIVE for fever, chills, change in weight  INTEGUMENTARY/SKIN: NEGATIVE for worrisome rashes, moles or lesions  MUSCULOSKELETAL:See HPI above  NEURO: NEGATIVE for weakness, dizziness or paresthesias    PHYSICAL EXAM:  BP (!) 142/82   Pulse 76   Resp 16   Ht 1.854 m (6' 1\")   Wt (!) 154.2 kg (340 lb)   BMI 44.86 kg/m    He reports he has lost 20 pounds with weight loss program through [facility]  GENERAL APPEARANCE: healthy, alert, no distress; obese " habitus.  SKIN: no suspicious lesions or rashes  NEURO: Normal strength and tone, mentation intact and speech normal  PSYCH:  mentation appears normal and affect normal  RESPIRATORY: No increased work of breathing.  LYMPH: no palpable inguinal lymph nodes.  Fairly mobile pannus.    BILATERAL LOWER EXTREMITIES:  Gait: Trendelenburg right   No gross deformities or masses.  No Quad atrophy, strength normal.  Intact sensation deep peroneal nerve, superficial peroneal nerve, med/lat tibial nerve, sural nerve, saphenous nerve  Intact EHL, EDL, TA, FHL, GS, quadriceps hamstrings and hip flexors  Toes warm and well perfused, brisk capillary refill. Palpable 2+ dp pulses.  Bilateral calf soft   DTRs: achilles 2+, patella 2+.  Edema: 1+  Leg lengths: grossly symmetric at medial malleolus.      BACK EXAM  Lumbar range of motion: flexion to touch knees and causes anterior groin pain, extension adequate, side bend: adequate  Seated SLR: negative , bilateral.  Supine SLR: negative , bilateral.  Sensation:normal, bilateral.  Sciatic Notch tenderness: negative    LEFT HIP EXAM:      Palpation: Tender:   None.  Hip range of motion: limited by habitus, pain-free.   External rotation 30 degrees, internal rotation 30-40 degrees.      RIGHT HIP EXAM:    Palpation: Tender:   none  Strength:  4/5  Special tests:  Irritability (flexion/adduction/internal rotation) positive   Hip range of motion: Internal rotation: 0 degrees, External rotation: 30 degrees , Flexion 80 degrees , limited by discomfort and habitus.        X-RAY: AP pelvis and AP/Lateral views right hip from 6/8/2020 were reviewed in clinic today. No obvious fractures or dislocations. Moderate right hip degenerative changes with articular flattening, subchondral cystic changes. The articular flattening has not changed compared to old x-ray from 6/4/2019.  It is not bone-on-bone.  Left total hip arthroplasty. .        Impression: 56yo male with chronic right hip pain, primary  osteoarthritis; obesity.  The pain is worse than his x-ray looks.  I am concerned that he may have some other process such as labrum tear causing pain.  His BMI is high and he is also borderline diabetic.  This would increase the risk of infection with total hip arthroplasty.  Given that he is not bone-on-bone, I feel this risk is too high.  It would be better to continue conservative treatment and weight loss.  If we could find any other source of pain in the hip, maybe treatment of that would help buy time.    Plan:   After thorough discussion, he will continue weight loss.  We will order MRI arthrogram to look for source of pain other than osteoarthritis.            Dr. Shiv Tabor   Dept. of Orthopaedic Surgery  [facility]            Again, thank you for allowing me to participate in the care of your patient.        Sincerely,        Shiv Tabor MD

## 2020-06-15 NOTE — PATIENT INSTRUCTIONS
Stanton to follow up with Primary Care provider regarding elevated blood pressure.  SMOKING CESSATION  What's in cigarette smoke? - Cigarette smoke contains over 4,000 chemicals. Nicotine is one of the main ingredients which is an insecticide/herbicide. It is poisonous to our nervous system, increases blood clotting risk, and decreases the body's defenses to fight off infection. Another chemical is Carbon Monoxide is an asphyxiating gas that permanently binds to blood cells and blocks the transport of oxygen. This leads to tissue death and decreases your metabolism. Tar is a chemical that coats your lungs and trachea which impairs new oxygen coming in and carbon dioxide getting out of your body.   How does smoking impact surgery? - Smoking is particularly hazardous with regards to surgery. Surgery puts stress on the body and a smoker's body is already under strain from these chemicals. Putting the two together, especially for an elective surgery, could be a recipe for disaster. Smoking before and after surgery increases your risk of heart problems, slow wound healing, delayed bone healing, blood clots, wound infection and anesthesia complications.   What are the benefits of quitting? - In 20 minutes your blood pressure will drop back down to normal. In 8 hours the carbon monoxide (a toxic gas) levels in your blood stream will drop by half, and oxygen levels will return to normal. In 48 hours your chance of having a heart attack will have decreased. All nicotine will have left your body. Your sense of taste and smell will return to a normal level. In 72 hours your bronchial tubes will relax, and your energy levels will increase. In 2 weeks your circulation will increase, and it will continue to improve for the next 10 weeks.    Recommendations for elective surgery - Ideally, patients should quit smoking 8 weeks before and at least 2 weeks after elective surgery in order to avoid complications. Simply cutting back on the  amount of cigarettes smoked per day does not offer any benefit or decrease the risk of poor wound healing, heart problems, and infection. Smokers should also start taking Vitamin C and B for two weeks before surgery and two weeks after surgery.    Ways to Stop Smokin. Nicotine patches, lozenges, or gum  2. Support Groups  3. Medications (see below)    List of Medications:  1. Varenicline Tartrate (CHANTIX)   2. Bupropion HCL (WELLBUTRIN, ZYBAN) - note: make sure Wellbutrin is for smoking cessation and not other issues   3. Nicotine Patch (NICODERM)   4. Nicotine Inhaler (NICOTROL)   5. Nicotine Gum Nicotine Polacrilex   6. Nicotine Lozenge: Nicotine Polacrilex (COMMIT)   * [facility] offers a smoking support group as well!  Please visit: [email or web address]  If you are interested in these, ask about getting a prescription or talk to your primary care doctor about what may be the best way for you to quit.

## 2020-06-16 ENCOUNTER — TELEPHONE (OUTPATIENT)
Dept: ORTHOPEDICS | Facility: CLINIC | Age: 55
End: 2020-06-16

## 2020-06-16 NOTE — TELEPHONE ENCOUNTER
Reason for call:  Order  Patient called regarding (reason for call): MRI order needs to be signed  Additional comments: Patient was told by imaging department they cannot schedule his appointment until the order is signed. Please call.     Phone number to reach patient:  Home number on file [phone] (home)    Best Time:  any    Can we leave a detailed message on this number?  YES    Travel screening: Not Applicable

## 2020-06-17 DIAGNOSIS — Z11.59 ENCOUNTER FOR SCREENING FOR OTHER VIRAL DISEASES: Primary | ICD-10-CM

## 2020-06-17 PROBLEM — M16.11 PRIMARY OSTEOARTHRITIS OF RIGHT HIP: Status: ACTIVE | Noted: 2020-06-17

## 2020-06-18 NOTE — PROGRESS NOTES
"Chief Complaint:   Chief Complaint   Patient presents with     Consult For     Right hip.       Lupillo Smith is a 55 year old male seen for evaluation of ongoing right hip pain with no known injury.   Pain has been present for years, but getting worse. Locates pain to the groin area, hip and upper thigh. The hip is stiff when up on feet for an hour or more. Aggravated with bending, in/out of car, donning shoes/socks, getting up out of a chair. Treatment has been intra-articular hip injections, lasting 4-5 months, most recent a year ago 6/2019. He's been followed by [facility] but doesn't meet their BMI requirements for total hip arthroplasty. Takes tramadol on occasion for the pain.  He also saw Dr. John Rehman who felt he might be candidate for total hip arthroplasty, but BMI is too high.  Lupillo Smith wanted another opinion.    Denies low back pain, occasional stiffness. Does get pain radiating to the knee \"from the hip\", but temporary. Denies numbness and tingling.    Left total hip arthroplasty 2013 with [facility], Dr Bob Stephenson, doing well.    Present symptoms: moderate pain, severe pain.    Pain severity: 5/10  Pain quality: aching, constant with occasional sharp shooting pain up to 7/10  Frequency of symptoms: are constant  Exacerbating Factors: weight bearing, [id number], in and out of car, bending over, prolonged standing.  Relieving Factors: laying flat  Night Pain: Yes  Pain while at rest: Yes   Associated numbness or tingling: No     Orthopedic PMH: left total hip arthroplasty 2013.    Other PMH:  has a past medical history of thyroid cancer (2013) and Urticaria.  Patient Active Problem List   Diagnosis     Prediabetes     Hypothyroidism     Morbid obesity (H)     Urticaria     Hyperlipidemia     Hx of papillary thyroid carcinoma     History of total thyroidectomy     Primary osteoarthritis of right hip       Surgical Hx:  has a past surgical history that includes Thyroidectomy " "(2013); joint replacement (Left, 10/2013); rotator cuff repair rt/lt (Right); and ANESTH,DX ARTHROSCOPIC PROC KNEE JOINT (Right, 04/1985).    Medications:   Current Outpatient Medications:      Blood Pressure Monitor KIT, 1 Device daily, Disp: 1 kit, Rfl: 1     Blood Pressure Monitor KIT, 1 Device daily, Disp: 1 kit, Rfl: 1     hydrOXYzine (ATARAX) 50 MG tablet, Take 1 tablet (50 mg) by mouth every 6 hours as needed for itching, Disp: 120 tablet, Rfl: 3     levothyroxine (SYNTHROID/LEVOTHROID) 137 MCG tablet, Take 2 tablets (274 mcg) by mouth daily, Disp: 180 tablet, Rfl: 3     metFORMIN (GLUCOPHAGE-XR) 750 MG 24 hr tablet, Take 2 tablets (1,500 mg) by mouth daily, Disp: 180 tablet, Rfl: 3    Allergies:   Allergies   Allergen Reactions     Oxycodone Nausea and Vomiting     Aspirin Hives     Ibuprofen Hives     Nsaids Swelling       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  reports that he has been smoking cigars. He has never used smokeless tobacco. He reports current alcohol use. He reports that he does not use drugs.    Family Hx: Family history is unknown by patient.    REVIEW OF SYSTEMS:  10 point ROS neg other than the symptoms noted above in the HPI and PAST MEDICAL HISTORY. Notables,  CONSTITUTIONAL:NEGATIVE for fever, chills, change in weight  INTEGUMENTARY/SKIN: NEGATIVE for worrisome rashes, moles or lesions  MUSCULOSKELETAL:See HPI above  NEURO: NEGATIVE for weakness, dizziness or paresthesias    PHYSICAL EXAM:  BP (!) 142/82   Pulse 76   Resp 16   Ht 1.854 m (6' 1\")   Wt (!) 154.2 kg (340 lb)   BMI 44.86 kg/m    He reports he has lost 20 pounds with weight loss program through [facility]  GENERAL APPEARANCE: healthy, alert, no distress; obese habitus.  SKIN: no suspicious lesions or rashes  NEURO: Normal strength and tone, mentation intact and speech normal  PSYCH:  mentation appears normal and affect normal  RESPIRATORY: No increased work of breathing.  LYMPH: no palpable " inguinal lymph nodes.  Fairly mobile pannus.    BILATERAL LOWER EXTREMITIES:  Gait: Trendelenburg right   No gross deformities or masses.  No Quad atrophy, strength normal.  Intact sensation deep peroneal nerve, superficial peroneal nerve, med/lat tibial nerve, sural nerve, saphenous nerve  Intact EHL, EDL, TA, FHL, GS, quadriceps hamstrings and hip flexors  Toes warm and well perfused, brisk capillary refill. Palpable 2+ dp pulses.  Bilateral calf soft   DTRs: achilles 2+, patella 2+.  Edema: 1+  Leg lengths: grossly symmetric at medial malleolus.      BACK EXAM  Lumbar range of motion: flexion to touch knees and causes anterior groin pain, extension adequate, side bend: adequate  Seated SLR: negative , bilateral.  Supine SLR: negative , bilateral.  Sensation:normal, bilateral.  Sciatic Notch tenderness: negative    LEFT HIP EXAM:      Palpation: Tender:   None.  Hip range of motion: limited by habitus, pain-free.   External rotation 30 degrees, internal rotation 30-40 degrees.      RIGHT HIP EXAM:    Palpation: Tender:   none  Strength:  4/5  Special tests:  Irritability (flexion/adduction/internal rotation) positive   Hip range of motion: Internal rotation: 0 degrees, External rotation: 30 degrees , Flexion 80 degrees , limited by discomfort and habitus.        X-RAY: AP pelvis and AP/Lateral views right hip from 6/8/2020 were reviewed in clinic today. No obvious fractures or dislocations. Moderate right hip degenerative changes with articular flattening, subchondral cystic changes. The articular flattening has not changed compared to old x-ray from 6/4/2019.  It is not bone-on-bone.  Left total hip arthroplasty. .        Impression: 54yo male with chronic right hip pain, primary osteoarthritis; obesity.  The pain is worse than his x-ray looks.  I am concerned that he may have some other process such as labrum tear causing pain.  His BMI is high and he is also borderline diabetic.  This would increase the risk of  infection with total hip arthroplasty.  Given that he is not bone-on-bone, I feel this risk is too high.  It would be better to continue conservative treatment and weight loss.  If we could find any other source of pain in the hip, maybe treatment of that would help buy time.    Plan:   After thorough discussion, he will continue weight loss.  We will order MRI arthrogram to look for source of pain other than osteoarthritis.            Dr. Shiv Tabor   Dept. of Orthopaedic Surgery  [facility]

## 2020-06-22 DIAGNOSIS — Z11.59 ENCOUNTER FOR SCREENING FOR OTHER VIRAL DISEASES: ICD-10-CM

## 2020-06-22 LAB
SARS-COV-2 RNA SPEC QL NAA+PROBE: NOT DETECTED
SPECIMEN SOURCE: NORMAL

## 2020-06-22 PROCEDURE — U0003 INFECTIOUS AGENT DETECTION BY NUCLEIC ACID (DNA OR RNA); SEVERE ACUTE RESPIRATORY SYNDROME CORONAVIRUS 2 (SARS-COV-2) (CORONAVIRUS DISEASE [COVID-19]), AMPLIFIED PROBE TECHNIQUE, MAKING USE OF HIGH THROUGHPUT TECHNOLOGIES AS DESCRIBED BY CMS-2020-01-R: HCPCS | Mod: 90 | Performed by: ORTHOPAEDIC SURGERY

## 2020-06-22 PROCEDURE — 99000 SPECIMEN HANDLING OFFICE-LAB: CPT | Performed by: ORTHOPAEDIC SURGERY

## 2020-06-22 PROCEDURE — 99207 ZZC NO CHARGE NURSE ONLY: CPT

## 2020-06-22 NOTE — LETTER
June 23, 2020        Lupillo Smith  [address] [place] [address]    This letter provides a written record that you were tested for COVID-19 on 6/22/20.       Your result was negative. This means that we didn t find the virus that causes COVID-19 in your sample. A test may show negative when you do actually have the virus. This can happen when the virus is in the early stages of infection, before you feel illness symptoms.    If you have symptoms   Stay home and away from others (self-isolate) until you meet ALL of the guidelines below:    You ve had no fever--and no medicine that reduces fever--for 3 full days (72 hours). And      Your other symptoms have gotten better. For example, your cough or breathing has improved. And     At least 10 days have passed since your symptoms started.    During this time:    Stay home. Don t go to work, school or anywhere else.     Stay in your own room, including for meals. Use your own bathroom if you can.    Stay away from others in your home. No hugging, kissing or shaking hands. No visitors.    Clean  high touch  surfaces often (doorknobs, counters, handles, etc.). Use a household cleaning spray or wipes. You can find a full list on the EPA website at www.epa.gov/pesticide-registration/list-n-disinfectants-use-against-sars-cov-2.    Cover your mouth and nose with a mask, tissue or washcloth to avoid spreading germs.    Wash your hands and face often with soap and water.    Going back to work  Check with your employer for any guidelines to follow for going back to work.    Employers: This document serves as formal notice that your employee tested negative for COVID-19, as of the testing date shown above.

## 2020-06-24 ENCOUNTER — HOSPITAL ENCOUNTER (OUTPATIENT)
Dept: GENERAL RADIOLOGY | Facility: CLINIC | Age: 55
End: 2020-06-24
Attending: ORTHOPAEDIC SURGERY
Payer: COMMERCIAL

## 2020-06-24 ENCOUNTER — HOSPITAL ENCOUNTER (OUTPATIENT)
Dept: MRI IMAGING | Facility: CLINIC | Age: 55
End: 2020-06-24
Attending: ORTHOPAEDIC SURGERY
Payer: COMMERCIAL

## 2020-06-24 DIAGNOSIS — M25.851 RIGHT HIP IMPINGEMENT SYNDROME: ICD-10-CM

## 2020-06-24 PROCEDURE — 27093 INJECTION FOR HIP X-RAY: CPT

## 2020-06-24 PROCEDURE — 25000125 ZZHC RX 250: Performed by: RADIOLOGY

## 2020-06-24 PROCEDURE — 73722 MRI JOINT OF LWR EXTR W/DYE: CPT | Mod: RT

## 2020-06-24 PROCEDURE — 25500064 ZZH RX 255 OP 636: Performed by: RADIOLOGY

## 2020-06-24 PROCEDURE — A9585 GADOBUTROL INJECTION: HCPCS | Performed by: RADIOLOGY

## 2020-06-24 RX ORDER — GADOBUTROL 604.72 MG/ML
0.1 INJECTION INTRAVENOUS ONCE
Status: DISCONTINUED | OUTPATIENT
Start: 2020-06-24 | End: 2020-06-24 | Stop reason: CLARIF

## 2020-06-24 RX ORDER — BUPIVACAINE HYDROCHLORIDE 5 MG/ML
10 INJECTION, SOLUTION EPIDURAL; INTRACAUDAL ONCE
Status: DISCONTINUED | OUTPATIENT
Start: 2020-06-24 | End: 2020-06-24 | Stop reason: CLARIF

## 2020-06-24 RX ORDER — GADOBUTROL 604.72 MG/ML
0.1 INJECTION INTRAVENOUS ONCE
Status: COMPLETED | OUTPATIENT
Start: 2020-06-24 | End: 2020-06-24

## 2020-06-24 RX ORDER — ACYCLOVIR 200 MG/1
10 CAPSULE ORAL ONCE
Status: DISCONTINUED | OUTPATIENT
Start: 2020-06-24 | End: 2020-06-24 | Stop reason: CLARIF

## 2020-06-24 RX ORDER — BUPIVACAINE HYDROCHLORIDE 5 MG/ML
10 INJECTION, SOLUTION PERINEURAL ONCE
Status: COMPLETED | OUTPATIENT
Start: 2020-06-24 | End: 2020-06-24

## 2020-06-24 RX ADMIN — BUPIVACAINE HYDROCHLORIDE 50 MG: 5 INJECTION, SOLUTION EPIDURAL; INTRACAUDAL at 15:18

## 2020-06-24 RX ADMIN — IOHEXOL 10 ML: 180 INJECTION INTRAVENOUS at 15:20

## 2020-06-24 RX ADMIN — GADOBUTROL 0.05 ML: 604.72 INJECTION INTRAVENOUS at 15:20

## 2020-06-24 RX ADMIN — LIDOCAINE HYDROCHLORIDE 10 ML: 10 INJECTION, SOLUTION EPIDURAL; INFILTRATION; INTRACAUDAL; PERINEURAL at 15:21

## 2020-07-03 ENCOUNTER — TELEPHONE (OUTPATIENT)
Dept: ORTHOPEDICS | Facility: CLINIC | Age: 55
End: 2020-07-03

## 2020-07-03 DIAGNOSIS — M25.851 RIGHT HIP IMPINGEMENT SYNDROME: Primary | ICD-10-CM

## 2020-07-03 NOTE — TELEPHONE ENCOUNTER
I called Stanton Smith on 7/3/2020 at 10:03 AM..  There is some labrum degeneration, but I'm not sure if it is enough to justify arthroscopy to debride it.  I offered referral to Dr. Nicholas Gonzalez , but Stanton is not sure if it is wasted time also.  He asked about right hip injection.  We will refer to Dr. Maximiliano Bowden for right hip steroid injection.

## 2020-07-07 ENCOUNTER — VIRTUAL VISIT (OUTPATIENT)
Dept: FAMILY MEDICINE | Facility: OTHER | Age: 55
End: 2020-07-07
Payer: COMMERCIAL

## 2020-07-07 PROCEDURE — 99421 OL DIG E/M SVC 5-10 MIN: CPT | Performed by: FAMILY MEDICINE

## 2020-07-07 NOTE — PROGRESS NOTES
"Date: 2020 11:36:33  Clinician: Bob Contreras  Clinician NPI: [id number]  Patient: Lupillo Smith  Patient : 1965  Patient Address: [address] [place] [address]  Patient Phone: [phone]  Visit Protocol: URI  Patient Summary:  Lupillo is a 55 year old ( : 1965 ) male who initiated a Visit for COVID-19 (Coronavirus) evaluation and screening. When asked the question \"Please sign me up to receive news, health information and promotions from [employer].\", Lupillo responded \"No\".    Lupillo states his symptoms started 1-2 days ago.   His symptoms consist of nausea, tooth pain, diarrhea, malaise, ear pain, a headache, chills, a sore throat, and myalgia. He is experiencing mild difficulty breathing with activities but can speak normally in full sentences. Lupillo also feels feverish but was unable to measure his temperature.   Symptom details     Sore throat: Lupillo reports having mild throat pain (1-3 on a 10 point pain scale), does not have exudate on his tonsils, and can swallow liquids. He is not sure if the lymph nodes in his neck are enlarged. A rash has not appeared on the skin since the sore throat started.     Headache: He states the headache is moderate (4-6 on a 10 point pain scale).     Tooth pain: The tooth pain is not caused by a cavity, recent dental work, or other mouth problems.      Lupillo denies having wheezing, ageusia, vomiting, rhinitis, anosmia, facial pain or pressure, cough, and nasal congestion. He also denies having recent facial or sinus surgery in the past 60 days, taking antibiotic medication in the past month, and having a sinus infection within the past year.   Precipitating events  Lupillo is not sure if he has been exposed to someone with strep throat. He has not recently been exposed to someone with influenza. Lupillo has been in close contact with the following high risk individuals: adults 65 or older.   Pertinent COVID-19 (Coronavirus) " information  In the past 14 days, Lupillo has not worked in a congregate living setting.   He does not work or volunteer as healthcare worker or a  and does not work or volunteer in a healthcare facility.   Lupillo also has not lived in a congregate living setting in the past 14 days. He does not live with a healthcare worker.   Lupillo has not had a close contact with a laboratory-confirmed COVID-19 patient within 14 days of symptom onset.   Pertinent medical history  Lupillo does not need a return to work/school note.   Weight: 335 lbs   Lupillo does not smoke or use smokeless tobacco.   Additional information as reported by the patient (free text): I had very severe shakes last night, like I was freezing?  Didn't stop until this morning   Weight: 335 lbs    MEDICATIONS: levothyroxine oral, hydroxyzine HCl intramuscular, metformin oral, ALLERGIES: NeoProfen (ibuprofen lysn)(PF), OxyContin, aspirin  Clinician Response:  Dear Lupillo,   Your symptoms show that you may have coronavirus (COVID-19). This illness can cause fever, cough and trouble breathing. Many people get a mild case and get better on their own. Some people can get very sick.  What should I do?  We would like to test you for this virus.   1. Please call [phone] to schedule your visit. Explain that you were referred by [facility] to have a COVID-19 test. Be ready to share your [facility] visit ID number.  The following will serve as your written order for this COVID Test, ordered by me, for the indication of suspected COVID [Z20.828]: The test will be ordered in [employer], our electronic health record, after you are scheduled. It will show as ordered and authorized by Sidney St MD.  Order: COVID-19 (Coronavirus) PCR for SYMPTOMATIC testing from [facility].      2. When it's time for your COVID test:  Stay at least 6 feet away from others. (If someone will drive you to your test, stay in the backseat, as far away from the  as you can.)   " Cover your mouth and nose with a mask, tissue or washcloth.  Go straight to the testing site. Don't make any stops on the way there or back.      3.Starting now: Stay home and away from others (self-isolate) until:   You've had no fever---and no medicine that reduces fever---for 3 full days (72 hours). And...   Your other symptoms have gotten better. For example, your cough or breathing has improved. And...   At least 10 days have passed since your symptoms started.       During this time, don't leave the house except for testing or medical care.   Stay in your own room, even for meals. Use your own bathroom if you can.   Stay away from others in your home. No hugging, kissing or shaking hands. No visitors.  Don't go to work, school or anywhere else.    Clean \"high touch\" surfaces often (doorknobs, counters, handles, etc.). Use a household cleaning spray or wipes. You'll find a full list of  on the EPA website: www.epa.gov/pesticide-registration/list-n-disinfectants-use-against-sars-cov-2.   Cover your mouth and nose with a mask, tissue or washcloth to avoid spreading germs.  Wash your hands and face often. Use soap and water.  Caregivers in these groups are at risk for severe illness due to COVID-19:  o People 65 years and older  o People who live in a nursing home or long-term care facility  o People with chronic disease (lung, heart, cancer, diabetes, kidney, liver, immunologic)  o People who have a weakened immune system, including those who:   Are in cancer treatment  Take medicine that weakens the immune system, such as corticosteroids  Had a bone marrow or organ transplant  Have an immune deficiency  Have poorly controlled HIV or AIDS  Are obese (body mass index of 40 or higher)  Smoke regularly   o Caregivers should wear gloves while washing dishes, handling laundry and cleaning bedrooms and bathrooms.  o Use caution when washing and drying laundry: Don't shake dirty laundry, and use the warmest " water setting that you can.  o For more tips, go to www.cdc.gov/coronavirus/2019-ncov/downloads/10Things.pdf.    4.Sign up for [facility]. We know it's scary to hear that you might have COVID-19. We want to track your symptoms to make sure you're okay over the next 2 weeks. Please look for an email from [employer]---this is a free, online program that we'll use to keep in touch. To sign up, follow the link in the email. Learn more at [email or web address]  How can I take care of myself?   Get lots of rest. Drink extra fluids (unless a doctor has told you not to).   Take Tylenol (acetaminophen) for fever or pain. If you have liver or kidney problems, ask your family doctor if it's okay to take Tylenol.   Adults can take either:    650 mg (two 325 mg pills) every 4 to 6 hours, or...   1,000 mg (two 500 mg pills) every 8 hours as needed.    Note: Don't take more than 3,000 mg in one day. Acetaminophen is found in many medicines (both prescribed and over-the-counter medicines). Read all labels to be sure you don't take too much.   For children, check the Tylenol bottle for the right dose. The dose is based on the child's age or weight.    If you have other health problems (like cancer, heart failure, an organ transplant or severe kidney disease): Call your specialty clinic if you don't feel better in the next 2 days.       Know when to call 911. Emergency warning signs include:    Trouble breathing or shortness of breath Pain or pressure in the chest that doesn't go away Feeling confused like you haven't felt before, or not being able to wake up Bluish-colored lips or face.  Where can I get more information?    [facility] -- About COVID-19: [email or web address]   CDC -- What to Do If You're Sick: www.cdc.gov/coronavirus/2019-ncov/about/steps-when-sick.html   CDC -- Ending Home Isolation: www.cdc.gov/coronavirus/2019-ncov/hcp/disposition-in-home-patients.html   [facility] -- Caring for Someone:  www.cdc.gov/coronavirus/2019-ncov/if-you-are-sick/care-for-someone.html   [facility] -- Interim Guidance for Hospital Discharge to Home: www.health.[facility].mn.us/diseases/coronavirus/hcp/hospdischarge.pdf   [facility] clinical trials (COVID-19 research studies): clinicalaffairs.[facility]-clinical-trials    Below are the COVID-19 hotlines at the [place] Department of Health ([facility]). Interpreters are available.    For health questions: Call 475-361-5902 or 1-900.227.3713 (7 a.m. to 7 p.m.) For questions about schools and childcare: Call 290-442-9004 or 1-987.106.1793 (7 a.m. to 7 p.m.)    Diagnosis: Other malaise  Diagnosis ICD: R53.81

## 2020-07-08 ENCOUNTER — NURSE TRIAGE (OUTPATIENT)
Dept: NURSING | Facility: CLINIC | Age: 55
End: 2020-07-08

## 2020-07-08 ENCOUNTER — OFFICE VISIT (OUTPATIENT)
Dept: URGENT CARE | Facility: URGENT CARE | Age: 55
End: 2020-07-08
Payer: COMMERCIAL

## 2020-07-08 VITALS
TEMPERATURE: 101.1 F | HEART RATE: 92 BPM | WEIGHT: 315 LBS | DIASTOLIC BLOOD PRESSURE: 80 MMHG | SYSTOLIC BLOOD PRESSURE: 126 MMHG | BODY MASS INDEX: 45.36 KG/M2

## 2020-07-08 DIAGNOSIS — L03.115 CELLULITIS OF RIGHT LOWER EXTREMITY: Primary | ICD-10-CM

## 2020-07-08 DIAGNOSIS — R50.9 FEVER, UNSPECIFIED FEVER CAUSE: ICD-10-CM

## 2020-07-08 PROCEDURE — 99213 OFFICE O/P EST LOW 20 MIN: CPT | Performed by: NURSE PRACTITIONER

## 2020-07-08 RX ORDER — CEPHALEXIN 500 MG/1
500 CAPSULE ORAL 4 TIMES DAILY
Qty: 40 CAPSULE | Refills: 0 | Status: SHIPPED | OUTPATIENT
Start: 2020-07-08 | End: 2020-07-14

## 2020-07-08 NOTE — PROGRESS NOTES
Subjective     Lupillo Smith is a 55 year old male who presents to clinic today for the following health issues:    HPI     Chief Complaint   Patient presents with     Leg Pain     right lower leg pain, swelling, redness, warm to touch - started this morning * fever*     Also had a virtual visit with Nazia Contreras yesterday and was told he most likely had covid but he did not say that when he arrived. Did not know about fever until he had already been roomed. But, his right leg cellulitis just started this morning. He works in his garden and has had lots of mosquito bites. He had cellulitis in same area of this leg 13 years ago and he says it feels exactly like that.    Patient Active Problem List   Diagnosis     Prediabetes     Hypothyroidism     Morbid obesity (H)     Urticaria     Hyperlipidemia     Hx of papillary thyroid carcinoma     History of total thyroidectomy     Primary osteoarthritis of right hip     Past Surgical History:   Procedure Laterality Date     C ANESTH,DX ARTHROSCOPIC PROC KNEE JOINT Right 04/1985     JOINT REPLACEMENT Left 10/2013    Left hip replacement     ROTATOR CUFF REPAIR RT/LT Right     9/2002- Right shoulder     THYROIDECTOMY  2013       Social History     Tobacco Use     Smoking status: Current Some Day Smoker     Types: Cigars     Smokeless tobacco: Never Used     Tobacco comment: One cigar every three months   Substance Use Topics     Alcohol use: Yes     Comment: rare     Family History   Family history unknown: Yes         Current Outpatient Medications   Medication Sig Dispense Refill     cephALEXin (KEFLEX) 500 MG capsule Take 1 capsule (500 mg) by mouth 4 times daily for 10 days 40 capsule 0     hydrOXYzine (ATARAX) 50 MG tablet Take 1 tablet (50 mg) by mouth every 6 hours as needed for itching 120 tablet 3     levothyroxine (SYNTHROID/LEVOTHROID) 137 MCG tablet Take 2 tablets (274 mcg) by mouth daily 180 tablet 3     metFORMIN (GLUCOPHAGE-XR) 750 MG 24 hr tablet Take 2  "tablets (1,500 mg) by mouth daily 180 tablet 3     Allergies   Allergen Reactions     Oxycodone Nausea and Vomiting     Aspirin Hives     Ibuprofen Hives     Nsaids Swelling         Reviewed and updated as needed this visit by Provider  Tobacco  Allergies  Meds  Problems  Med Hx  Surg Hx  Fam Hx         Review of Systems   Constitutional, HEENT, cardiovascular, pulmonary, GI, , musculoskeletal, neuro, skin, endocrine and psych systems are negative, except as otherwise noted.      Objective    /80   Pulse 92   Temp 101.1  F (38.4  C) (Tympanic)   Wt (!) 155.9 kg (343 lb 12.8 oz)   BMI 45.36 kg/m    Body mass index is 45.36 kg/m .  Physical Exam   GENERAL: healthy, alert and no distress, nontoxic in appearance  EYES: Eyes grossly normal to inspection, PERRL and conjunctivae and sclerae normal  HENT: normocephalic  NECK: supple with full ROM  ABDOMEN: soft, nontender  MS: no gross musculoskeletal defects noted, no edema, right leg is red with cellulitis from mid shin down to ankle surround the front and sides of leg. No break in skin.    Diagnostic Test Results:  Labs reviewed in Epic  No results found for this or any previous visit (from the past 24 hour(s)).        Assessment & Plan   Problem List Items Addressed This Visit     None      Visit Diagnoses     Cellulitis of right lower extremity    -  Primary    Relevant Medications    cephALEXin (KEFLEX) 500 MG capsule    Fever, unspecified fever cause                 Tobacco Cessation:   reports that he has been smoking cigars. He has never used smokeless tobacco.  Tobacco Cessation Action Plan: Information offered: Patient not interested at this time      BMI:   Estimated body mass index is 45.36 kg/m  as calculated from the following:    Height as of 6/15/20: 1.854 m (6' 1\").    Weight as of this encounter: 155.9 kg (343 lb 12.8 oz).   Weight management plan: Patient was referred to their PCP to discuss a diet and exercise plan.          Patient " Instructions   Increase rest and fluids. Tylenol and/or Ibuprofen for comfort.  If your symptoms worsen or do not resolve follow up with your primary care provider in 1 week and sooner if needed.      Take antibiotics as directed.  Indications for emergent return to emergency department discussed with patient, who verbalized good understanding and agreement.  Patient understands the limitations of today's evaluation.           Patient Education     Cellulitis  Cellulitis is an infection of the deep layers of skin. A break in the skin, such as a cut or scratch, can let bacteria under the skin. If the bacteria get to deep layers of the skin, it can be serious. If not treated, cellulitis can get into the bloodstream and lymph nodes. The infection can then spread throughout the body. This causes serious illness.  Cellulitis causes the affected skin to become red, swollen, warm, and sore. The reddened areas have a visible border. An open sore may leak fluid (pus). You may have a fever, chills, and pain.  Cellulitis is treated with antibiotics taken for 7 to 10 days. An open sore may be cleaned and covered with cool wet gauze. Symptoms should get better 1 to 2 days after treatment is started. Make sure to take all the antibiotics for the full number of days until they are gone. Keep taking the medicine even if your symptoms go away.  Home care  Follow these tips:    Limit the use of the part of your body with cellulitis.     If the infection is on your leg, keep your leg raised while sitting. This will help to reduce swelling.    Take all of the antibiotic medicine exactly as directed until it is gone. Do not miss any doses, especially during the first 7 days. Don t stop taking the medicine when your symptoms get better.    Keep the affected area clean and dry.    Wash your hands with soap and warm water before and after touching your skin. Anyone else who touches your skin should also wash his or her hands. Don't share  towels.  Follow-up care  Follow up with your healthcare provider, or as advised. If your infection does not go away on the first antibiotic, your healthcare provider will prescribe a different one.  When to seek medical advice  Call your healthcare provider right away if any of these occur:    Red areas that spread    Swelling or pain that gets worse    Fluid leaking from the skin (pus)    Fever higher of 100.4  F (38.0  C) or higher after 2 days on antibiotics  Date Last Reviewed: 9/1/2016 2000-2019 [employer]. [address] [place] [address]. All rights reserved. This information is not intended as a substitute for professional medical care. Always follow your healthcare professional's instructions.           Patient Education     Coronavirus Disease 2019 (COVID-19): Caring for Yourself or Others  If you or a household member have symptoms of COVID-19, follow the guidelines below. This will help you manage symptoms and keep the virus from spreading.  If you have symptoms of COVID-19    Stay home and contact your care team. They will tell you what to do.    Don t panic. Keep in mind that other illnesses can cause similar symptoms.    Stay away from work, school, and public places.    Limit physical contact with others in your home. Limit visitors. No kissing.    Clean surfaces you touch with disinfectant.    If you need to cough or sneeze, do it into a tissue. Then, throw the tissue into the trash. If you don't have tissues, cough or sneeze into the bend of your elbow    Don t share food or personal items with people in your household. This includes items like eating and drinking utensils, towels, and bedding.    Wear a cloth face mask around other people. It should cover your nose and mouth. You may need to make your own mask using a bandana, T-shirt, or other cloth. See the CDC s instructions on how to make a mask.    If you need to go to a hospital or clinic, call ahead to let them  know. Expect the care team to wear masks, gowns, gloves, and eye protection. You may be put in a separate room.    Follow all instructions from your care team.  If you ve been diagnosed with COVID-19    Stay home and away from others, including other people in your home. (This is called self-isolation.) Don t leave home unless you need to get medical care. Don't go to work, school, or public places. Don't use buses, taxis, or other public transportation.    Follow all instructions from your care team.    If you need to go to a hospital or clinic, call ahead to let them know. Expect the care team to wear masks, gowns, gloves, and eye protection. You may be put in a separate room.    Wear a face mask over your nose and mouth. This is to protect others from your germs. If you can t wear a mask, your caregivers should wear one. You may need to make your own mask using a bandana, T-shirt, or other cloth. See the [facility] s instructions on how to make a mask.    Have no contact with pets and other animals.    Don t share food or personal items with people in your household. This includes items like eating and drinking utensils, towels, and bedding.    If you need to cough or sneeze, do it into a tissue. Then, throw the tissue into the trash. If you don't have tissues, cough or sneeze into the bend of your elbow.    Wash your hands often.  Self-care at home  At this time, there is no medicine approved to prevent or treat COVID-19. Experts are testing different medicines, trying to find one that works.  So far, the most proven treatment is to support your body while it fights the virus.    Get plenty of rest.    Drink extra fluids (6 to 8 glasses of liquids each day), unless a doctor has told you not to. Ask your care team which fluids are best for you. Avoid fluids that contain caffeine or alcohol.    Take over-the-counter (OTC) medicine to help reduce pain and fever. Your care team will tell you which OTC medicine to use.  If  you ve been in the hospital for COVID-19, follow your care team s instructions. This may include changing positions to help your breathing (such as lying on your belly).  If a test showed that you have COVID-19, you may be asked to donate plasma after you ve recovered. (This is called COVID-19 convalescent plasma donation.) The plasma may contain antibodies to help fight the virus in others. Scientists are testing whether this might be a treatment in the future. For more information, talk to your care team.  Caring for a sick person    Follow all instructions from the care team.    Wash your hands often.    Wear protective clothing as advised.    Make sure the sick person wears a mask. If they can't wear a mask, don't stay in the same room with them. If you must be in the same room, wear a face mask. Make sure the mask covers both the nose and mouth.    Keep track of the sick person s symptoms.    Clean surfaces often with disinfectant. This includes phones, kitchen counters, fridge door handles, bathroom surfaces, and others.    Don t let anyone share household items with the sick person. This includes eating and drinking tools, towels, sheets, and blankets.    Clean fabrics and laundry well.    Keep other people and pets away from the sick person.  When you can stop self-isolation  When you are sick with COVID-19, you should stay away from other people. This is called self-isolation. The rules for ending self-isolation depend on your health in general.  If you are normally healthy  You can stop self-isolation when all 3 of these are true:    You ve had no fever--and no medicine that reduces fever--for 3 full days (72 hours). And     Your symptoms are better, such as cough or trouble breathing. And     At least 10 days have passed since your symptoms first started.  Talk with your care team before you leave home. They may tell you it s okay to leave, or they may give you different advice.  If you have a weak immune  system  If you re being treated for cancer, have an immune disorder (such as HIV), or have had a transplant (organ or bone marrow), follow your care team s instructions. You may be able to end self-isolation when all 3 of these are true:    You ve had no fever--and no medicine that reduces fever--for 3 full days (72 hours). And     Your symptoms are better, such as cough or trouble breathing. And     You ve had 2 tests for COVID-19. The tests happened at least 24 hours apart, and both show that you don t have the virus. (If no tests are available, your care team may tell you to follow the rules for normally healthy people above.)  When to call your care team  Call your care team right away if a sick person has any of these:    Trouble breathing    Pain or pressure in the chest  If a sick person has any of these, call 911:    Trouble breathing that gets worse    Pain or pressure in the chest that gets worse    Blue tint to lips or face    Fast or irregular heartbeat    Confusion or trouble waking    Fainting or loss of consciousness    Coughing up blood  Going home from the hospital  If you have COVID-19 and were recently in the hospital:    Follow the instructions above for self-care and isolation.    Follow the hospital care team s instructions.    Ask questions if anything is unclear to you. Write down answers so you remember them.  Last modified date: 5/8/2020  This information has been modified by your health care provider with permission from the publisher.      [phone] [address], [place] [address]. All rights reserved. This information is not intended as a substitute for professional medical care. Always follow your healthcare professional's instructions. This information has been modified by your health care provider with permission from the publisher.             Return in about 2 days (around 7/10/2020) for Follow up with your primary care provider.    CRYSTAL Baker  [facility]

## 2020-07-08 NOTE — TELEPHONE ENCOUNTER
This caller called regarding right leg pain (8/10) when touched, otherwise 4/10 if not touched that started yesterday (Tuesday-7/7).caller states that the pain is on the whole of his right leg involving the calf, no pain on the thigh  States he is also having swelling and redness of the right leg.Reported chills and fever that has been on and off, denied shortness of breadth  Caller reported that  right leg is very sensitive to touch.  States that he had cellulitis in the same leg 13 years ago  Lian Gallegos RN  COVID 19 Nurse Triage Plan/Patient Instructions    Please be aware that novel coronavirus (COVID-19) may be circulating in the community. If you develop symptoms such as fever, cough, or SOB or if you have concerns about the presence of another infection including coronavirus (COVID-19), please contact your health care provider or visit [email or web address].     Disposition/Instructions    Patient to go to ED and follow protocol based instructions.     Bring Your Own Device:  Please also bring your smart device(s) (smart phones, tablets, laptops) and their charging cables for your personal use and to communicate with your care team during your visit.    Thank you for taking steps to prevent the spread of this virus.  o Limit your contact with others.  o Wear a simple mask to cover your cough.  o Wash your hands well and often.    Resources    [facility]: About COVID-19: [email or web address]    CDC: What to Do If You're Sick: www.cdc.gov/coronavirus/2019-ncov/about/steps-when-sick.html    CDC: Ending Home Isolation: www.cdc.gov/coronavirus/2019-ncov/hcp/disposition-in-home-patients.html     CDC: Caring for Someone: www.cdc.gov/coronavirus/2019-ncov/if-you-are-sick/care-for-someone.html     [facility]: Interim Guidance for Hospital Discharge to Home: www.health.[facility].[place]/diseases/coronavirus/hcp/hospdischarge.pdf    [facility] clinical trials (COVID-19 research studies):  clinicalaffairs.[facility]-clinical-trials     Below are the COVID-19 hotlines at the [place] Department of Health ([facility]). Interpreters are available.   o For health questions: Call 525-694-5838 or 1-929.452.5786 (7 a.m. to 7 p.m.)  o For questions about schools and childcare: Call 665-571-9395 or 1-189.957.5783 (7 a.m. to 7 p.m.)                     Additional Information    Negative: Chest pain    Negative: Small area of swelling and followed an insect bite to the area    Negative: Followed a knee injury    Negative: Ankle or foot injury    Negative: Pregnant with leg swelling or edema    Negative: Difficulty breathing at rest    Negative: Entire foot is cool or blue in comparison to other side    Thigh or calf pain and only 1 side and present > 1 hour     Also experiencing redness of the right leg-previous history of cellulitis per caller    Protocols used: LEG SWELLING AND EDEMA-A-OH

## 2020-07-08 NOTE — PATIENT INSTRUCTIONS
Increase rest and fluids. Tylenol and/or Ibuprofen for comfort.  If your symptoms worsen or do not resolve follow up with your primary care provider in 1 week and sooner if needed.      Take antibiotics as directed.  Indications for emergent return to emergency department discussed with patient, who verbalized good understanding and agreement.  Patient understands the limitations of today's evaluation.           Patient Education     Cellulitis  Cellulitis is an infection of the deep layers of skin. A break in the skin, such as a cut or scratch, can let bacteria under the skin. If the bacteria get to deep layers of the skin, it can be serious. If not treated, cellulitis can get into the bloodstream and lymph nodes. The infection can then spread throughout the body. This causes serious illness.  Cellulitis causes the affected skin to become red, swollen, warm, and sore. The reddened areas have a visible border. An open sore may leak fluid (pus). You may have a fever, chills, and pain.  Cellulitis is treated with antibiotics taken for 7 to 10 days. An open sore may be cleaned and covered with cool wet gauze. Symptoms should get better 1 to 2 days after treatment is started. Make sure to take all the antibiotics for the full number of days until they are gone. Keep taking the medicine even if your symptoms go away.  Home care  Follow these tips:    Limit the use of the part of your body with cellulitis.     If the infection is on your leg, keep your leg raised while sitting. This will help to reduce swelling.    Take all of the antibiotic medicine exactly as directed until it is gone. Do not miss any doses, especially during the first 7 days. Don t stop taking the medicine when your symptoms get better.    Keep the affected area clean and dry.    Wash your hands with soap and warm water before and after touching your skin. Anyone else who touches your skin should also wash his or her hands. Don't share  towels.  Follow-up care  Follow up with your healthcare provider, or as advised. If your infection does not go away on the first antibiotic, your healthcare provider will prescribe a different one.  When to seek medical advice  Call your healthcare provider right away if any of these occur:    Red areas that spread    Swelling or pain that gets worse    Fluid leaking from the skin (pus)    Fever higher of 100.4  F (38.0  C) or higher after 2 days on antibiotics  Date Last Reviewed: 9/1/2016 2000-2019 [employer]. [address] [place] [id number]. All rights reserved. This information is not intended as a substitute for professional medical care. Always follow your healthcare professional's instructions.           Patient Education     Coronavirus Disease 2019 (COVID-19): Caring for Yourself or Others  If you or a household member have symptoms of COVID-19, follow the guidelines below. This will help you manage symptoms and keep the virus from spreading.  If you have symptoms of COVID-19    Stay home and contact your care team. They will tell you what to do.    Don t panic. Keep in mind that other illnesses can cause similar symptoms.    Stay away from work, school, and public places.    Limit physical contact with others in your home. Limit visitors. No kissing.    Clean surfaces you touch with disinfectant.    If you need to cough or sneeze, do it into a tissue. Then, throw the tissue into the trash. If you don't have tissues, cough or sneeze into the bend of your elbow    Don t share food or personal items with people in your household. This includes items like eating and drinking utensils, towels, and bedding.    Wear a cloth face mask around other people. It should cover your nose and mouth. You may need to make your own mask using a bandana, T-shirt, or other cloth. See the CDC s instructions on how to make a mask.    If you need to go to a hospital or clinic, call ahead to let them  know. Expect the care team to wear masks, gowns, gloves, and eye protection. You may be put in a separate room.    Follow all instructions from your care team.  If you ve been diagnosed with COVID-19    Stay home and away from others, including other people in your home. (This is called self-isolation.) Don t leave home unless you need to get medical care. Don't go to work, school, or public places. Don't use buses, taxis, or other public transportation.    Follow all instructions from your care team.    If you need to go to a hospital or clinic, call ahead to let them know. Expect the care team to wear masks, gowns, gloves, and eye protection. You may be put in a separate room.    Wear a face mask over your nose and mouth. This is to protect others from your germs. If you can t wear a mask, your caregivers should wear one. You may need to make your own mask using a bandana, T-shirt, or other cloth. See the [facility] s instructions on how to make a mask.    Have no contact with pets and other animals.    Don t share food or personal items with people in your household. This includes items like eating and drinking utensils, towels, and bedding.    If you need to cough or sneeze, do it into a tissue. Then, throw the tissue into the trash. If you don't have tissues, cough or sneeze into the bend of your elbow.    Wash your hands often.  Self-care at home  At this time, there is no medicine approved to prevent or treat COVID-19. Experts are testing different medicines, trying to find one that works.  So far, the most proven treatment is to support your body while it fights the virus.    Get plenty of rest.    Drink extra fluids (6 to 8 glasses of liquids each day), unless a doctor has told you not to. Ask your care team which fluids are best for you. Avoid fluids that contain caffeine or alcohol.    Take over-the-counter (OTC) medicine to help reduce pain and fever. Your care team will tell you which OTC medicine to use.  If  you ve been in the hospital for COVID-19, follow your care team s instructions. This may include changing positions to help your breathing (such as lying on your belly).  If a test showed that you have COVID-19, you may be asked to donate plasma after you ve recovered. (This is called COVID-19 convalescent plasma donation.) The plasma may contain antibodies to help fight the virus in others. Scientists are testing whether this might be a treatment in the future. For more information, talk to your care team.  Caring for a sick person    Follow all instructions from the care team.    Wash your hands often.    Wear protective clothing as advised.    Make sure the sick person wears a mask. If they can't wear a mask, don't stay in the same room with them. If you must be in the same room, wear a face mask. Make sure the mask covers both the nose and mouth.    Keep track of the sick person s symptoms.    Clean surfaces often with disinfectant. This includes phones, kitchen counters, fridge door handles, bathroom surfaces, and others.    Don t let anyone share household items with the sick person. This includes eating and drinking tools, towels, sheets, and blankets.    Clean fabrics and laundry well.    Keep other people and pets away from the sick person.  When you can stop self-isolation  When you are sick with COVID-19, you should stay away from other people. This is called self-isolation. The rules for ending self-isolation depend on your health in general.  If you are normally healthy  You can stop self-isolation when all 3 of these are true:    You ve had no fever--and no medicine that reduces fever--for 3 full days (72 hours). And     Your symptoms are better, such as cough or trouble breathing. And     At least 10 days have passed since your symptoms first started.  Talk with your care team before you leave home. They may tell you it s okay to leave, or they may give you different advice.  If you have a weak immune  system  If you re being treated for cancer, have an immune disorder (such as HIV), or have had a transplant (organ or bone marrow), follow your care team s instructions. You may be able to end self-isolation when all 3 of these are true:    You ve had no fever--and no medicine that reduces fever--for 3 full days (72 hours). And     Your symptoms are better, such as cough or trouble breathing. And     You ve had 2 tests for COVID-19. The tests happened at least 24 hours apart, and both show that you don t have the virus. (If no tests are available, your care team may tell you to follow the rules for normally healthy people above.)  When to call your care team  Call your care team right away if a sick person has any of these:    Trouble breathing    Pain or pressure in the chest  If a sick person has any of these, call 911:    Trouble breathing that gets worse    Pain or pressure in the chest that gets worse    Blue tint to lips or face    Fast or irregular heartbeat    Confusion or trouble waking    Fainting or loss of consciousness    Coughing up blood  Going home from the hospital  If you have COVID-19 and were recently in the hospital:    Follow the instructions above for self-care and isolation.    Follow the hospital care team s instructions.    Ask questions if anything is unclear to you. Write down answers so you remember them.  Last modified date: 5/8/2020  This information has been modified by your health care provider with permission from the publisher.      [id number], [place] [address]. All rights reserved. This information is not intended as a substitute for professional medical care. Always follow your healthcare professional's instructions. This information has been modified by your health care provider with permission from the publisher.

## 2020-07-09 DIAGNOSIS — Z20.822 ENCOUNTER FOR LABORATORY TESTING FOR COVID-19 VIRUS: Primary | ICD-10-CM

## 2020-07-09 PROCEDURE — U0003 INFECTIOUS AGENT DETECTION BY NUCLEIC ACID (DNA OR RNA); SEVERE ACUTE RESPIRATORY SYNDROME CORONAVIRUS 2 (SARS-COV-2) (CORONAVIRUS DISEASE [COVID-19]), AMPLIFIED PROBE TECHNIQUE, MAKING USE OF HIGH THROUGHPUT TECHNOLOGIES AS DESCRIBED BY CMS-2020-01-R: HCPCS | Performed by: FAMILY MEDICINE

## 2020-07-09 NOTE — LETTER
July 13, 2020        Lupillo Smith  [address] [place] [address]    This letter provides a written record that you were tested for COVID-19 on 7/9/20.       Your result was negative. This means that we didn t find the virus that causes COVID-19 in your sample. A test may show negative when you do actually have the virus. This can happen when the virus is in the early stages of infection, before you feel illness symptoms.    If you have symptoms   Stay home and away from others (self-isolate) until you meet ALL of the guidelines below:    You ve had no fever--and no medicine that reduces fever--for 3 full days (72 hours). And      Your other symptoms have gotten better. For example, your cough or breathing has improved. And     At least 10 days have passed since your symptoms started.    During this time:    Stay home. Don t go to work, school or anywhere else.     Stay in your own room, including for meals. Use your own bathroom if you can.    Stay away from others in your home. No hugging, kissing or shaking hands. No visitors.    Clean  high touch  surfaces often (doorknobs, counters, handles, etc.). Use a household cleaning spray or wipes. You can find a full list on the EPA website at www.epa.gov/pesticide-registration/list-n-disinfectants-use-against-sars-cov-2.    Cover your mouth and nose with a mask, tissue or washcloth to avoid spreading germs.    Wash your hands and face often with soap and water.    Going back to work  Check with your employer for any guidelines to follow for going back to work.    Employers: This document serves as formal notice that your employee tested negative for COVID-19, as of the testing date shown above.

## 2020-07-09 NOTE — PROGRESS NOTES
Lupillo Smith  :  1965  DOS: 2020  MRN: [id number]    Sports Medicine Clinic Procedure    Ultrasound Guided Right Intra-Articular Hip Injection    Clinical History: referred for an injection with Dr. Tabor.  Is currently on antibiotics for cellulitis for his lower leg.  He is currently on antibiotics.      Diagnosis:   1. Hip pain, right    2. Right hip impingement syndrome      Referring Physician: Shiv Tabor MD  Procedures      Impression:  Future planning for right intra-articular hip injection.    Plan:  Follow up 2 weeks, appt made for 2020  Currently on abx for lower leg cellulitis, rescheduled for 1 wk s/p completion of tx  All questions were answered today  Contact us with additional questions or concerns  Signs and sx of concern reviewed      Maximiliano Bowden DO, CAQ  Primary Care Sports Medicine  [facility]

## 2020-07-10 ENCOUNTER — OFFICE VISIT (OUTPATIENT)
Dept: FAMILY MEDICINE | Facility: CLINIC | Age: 55
End: 2020-07-10
Payer: COMMERCIAL

## 2020-07-10 VITALS
RESPIRATION RATE: 18 BRPM | TEMPERATURE: 97.7 F | BODY MASS INDEX: 41.75 KG/M2 | SYSTOLIC BLOOD PRESSURE: 138 MMHG | HEART RATE: 68 BPM | DIASTOLIC BLOOD PRESSURE: 80 MMHG | HEIGHT: 73 IN | WEIGHT: 315 LBS

## 2020-07-10 DIAGNOSIS — L03.115 CELLULITIS OF RIGHT LOWER EXTREMITY: Primary | ICD-10-CM

## 2020-07-10 LAB
SARS-COV-2 RNA SPEC QL NAA+PROBE: NOT DETECTED
SPECIMEN SOURCE: NORMAL

## 2020-07-10 PROCEDURE — 99213 OFFICE O/P EST LOW 20 MIN: CPT | Performed by: FAMILY MEDICINE

## 2020-07-10 RX ORDER — DOXYCYCLINE HYCLATE 100 MG
100 TABLET ORAL 2 TIMES DAILY
Qty: 20 TABLET | Refills: 0 | Status: SHIPPED | OUTPATIENT
Start: 2020-07-10 | End: 2020-07-14

## 2020-07-10 ASSESSMENT — MIFFLIN-ST. JEOR: SCORE: 2454.72

## 2020-07-10 NOTE — PROGRESS NOTES
SUBJECTIVE   Lupillo Smith is a 55 year old male who presents with     ED/UC Followup:    Facility:  [facility]   Date of visit: 7/8/2020  Reason for visit: Cellulitis of right lower leg   Current Status: Says its doing better. Still pretty red. Started to swell now that he has been up and on his leg.          PCP   Physician No Ref-Primary None    Health Maintenance        Health Maintenance Due   Topic Date Due     PREVENTIVE CARE VISIT  1965     COLORECTAL CANCER SCREENING  06/04/1977     HIV SCREENING  06/04/1982     PNEUMOCOCCAL IMMUNIZATION 19-64 MEDIUM RISK (1 of 1 - PPSV23) 06/04/1986     DTAP/TDAP/TD IMMUNIZATION (1 - Tdap) 06/04/1992     LIPID  06/04/2002     ZOSTER IMMUNIZATION (1 of 2) 06/04/2017     PHQ-2  01/01/2020       HPI        Patient Active Problem List   Diagnosis     Prediabetes     Hypothyroidism     Morbid obesity (H)     Urticaria     Hyperlipidemia     Hx of papillary thyroid carcinoma     History of total thyroidectomy     Primary osteoarthritis of right hip     Current Outpatient Medications   Medication     cephALEXin (KEFLEX) 500 MG capsule     hydrOXYzine (ATARAX) 50 MG tablet     levothyroxine (SYNTHROID/LEVOTHROID) 137 MCG tablet     metFORMIN (GLUCOPHAGE-XR) 750 MG 24 hr tablet     No current facility-administered medications for this visit.        Patient Active Problem List   Diagnosis     Prediabetes     Hypothyroidism     Morbid obesity (H)     Urticaria     Hyperlipidemia     Hx of papillary thyroid carcinoma     History of total thyroidectomy     Primary osteoarthritis of right hip     Past Surgical History:   Procedure Laterality Date     BIOPSY  Multiple    Pre-thyroid removal     C ANESTH,DX ARTHROSCOPIC PROC KNEE JOINT Right 04/1985     JOINT REPLACEMENT Left 10/2013    Left hip replacement     ROTATOR CUFF REPAIR RT/LT Right     9/2002- Right shoulder     THYROIDECTOMY  2013       Social History     Tobacco Use     Smoking status: Current Some Day Smoker      "Types: Cigars     Smokeless tobacco: Never Used     Tobacco comment: One cigar every three months   Substance Use Topics     Alcohol use: Yes     Comment: rare     Family History   Family history unknown: Yes   Problem Relation Age of Onset     Family history unknown: Yes     Diabetes Maternal Grandmother      Hypertension Father          Current Outpatient Medications   Medication Sig Dispense Refill     cephALEXin (KEFLEX) 500 MG capsule Take 1 capsule (500 mg) by mouth 4 times daily for 10 days 40 capsule 0     hydrOXYzine (ATARAX) 50 MG tablet Take 1 tablet (50 mg) by mouth every 6 hours as needed for itching 120 tablet 3     levothyroxine (SYNTHROID/LEVOTHROID) 137 MCG tablet Take 2 tablets (274 mcg) by mouth daily 180 tablet 3     metFORMIN (GLUCOPHAGE-XR) 750 MG 24 hr tablet Take 2 tablets (1,500 mg) by mouth daily 180 tablet 3     Allergies   Allergen Reactions     Oxycodone Nausea and Vomiting     Aspirin Hives     Ibuprofen Hives     Nsaids Swelling     Recent Labs   Lab Test 12/30/19  1333 08/29/18  1745   A1C 5.7* 6.2*   ALT  --  26   CR  --  0.86   GFRESTIMATED  --  >90   GFRESTBLACK  --  >90   POTASSIUM  --  4.3   TSH 0.61 3.56      BP Readings from Last 3 Encounters:   07/10/20 138/80   07/08/20 126/80   06/15/20 (!) 142/82    Wt Readings from Last 3 Encounters:   07/10/20 (!) 155.6 kg (343 lb)   07/08/20 (!) 155.9 kg (343 lb 12.8 oz)   06/15/20 (!) 154.2 kg (340 lb)                    Reviewed and updated:  Tobacco  Allergies  Meds  Med Hx  Surg Hx  Fam Hx  Soc Hx     ROS:  Constitutional, HEENT, cardiovascular, pulmonary, gi and gu systems are negative, except as otherwise noted.    PHYSICAL EXAM   /80 (Cuff Size: Adult Large)   Pulse 68   Temp 97.7  F (36.5  C) (Tympanic)   Resp 18   Ht 1.854 m (6' 1\")   Wt (!) 155.6 kg (343 lb)   BMI 45.25 kg/m    Body mass index is 45.25 kg/m .  GENERAL: alert and no distress  NECK: no adenopathy, no asymmetry, masses, or scars and thyroid " normal to palpation  RESP: lungs clear to auscultation - no rales, rhonchi or wheezes  CV: regular rate and rhythm, normal S1 S2, no S3 or S4, no murmur, click or rub, no peripheral edema and peripheral pulses strong  ABDOMEN: soft, nontender, no hepatosplenomegaly, no masses and bowel sounds normal  SKIN: Right lower leg erythematous, swollen, warmth and tender on palpation, Homans sign negative, pedal pulses 3+, sensation to touch and pressure intact  NEURO: Normal strength and tone, mentation intact and speech normal              Assessment & Plan     (L03.115) Cellulitis of right lower extremity  (primary encounter diagnosis)  Comment: Symptoms are secondary to right lower leg cellulitis, denies any consideration of symptoms.  Will add doxycycline and suggested to continue cephalexin, leg elevation.  Follow-up early next week.  Instructed to go ER if symptoms worsen over the weekend.  Patient understood and in agreement with above plan.  All questions answered.  Plan: doxycycline hyclate (VIBRA-TABS) 100 MG tablet            Patient Instructions     Patient Education     Cellulitis  Cellulitis is an infection of the deep layers of skin. A break in the skin, such as a cut or scratch, can let bacteria under the skin. If the bacteria get to deep layers of the skin, it can be serious. If not treated, cellulitis can get into the bloodstream and lymph nodes. The infection can then spread throughout the body. This causes serious illness.  Cellulitis causes the affected skin to become red, swollen, warm, and sore. The reddened areas have a visible border. An open sore may leak fluid (pus). You may have a fever, chills, and pain.  Cellulitis is treated with antibiotics taken for 7 to 10 days. An open sore may be cleaned and covered with cool wet gauze. Symptoms should get better 1 to 2 days after treatment is started. Make sure to take all the antibiotics for the full number of days until they are gone. Keep taking the  medicine even if your symptoms go away.  Home care  Follow these tips:    Limit the use of the part of your body with cellulitis.     If the infection is on your leg, keep your leg raised while sitting. This will help to reduce swelling.    Take all of the antibiotic medicine exactly as directed until it is gone. Do not miss any doses, especially during the first 7 days. Don t stop taking the medicine when your symptoms get better.    Keep the affected area clean and dry.    Wash your hands with soap and warm water before and after touching your skin. Anyone else who touches your skin should also wash his or her hands. Don't share towels.  Follow-up care  Follow up with your healthcare provider, or as advised. If your infection does not go away on the first antibiotic, your healthcare provider will prescribe a different one.  When to seek medical advice  Call your healthcare provider right away if any of these occur:    Red areas that spread    Swelling or pain that gets worse    Fluid leaking from the skin (pus)    Fever higher of 100.4  F (38.0  C) or higher after 2 days on antibiotics  Date Last Reviewed: 9/1/2016 2000-2019 [employer]. [address] [place] [address]. All rights reserved. This information is not intended as a substitute for professional medical care. Always follow your healthcare professional's instructions.               Benitez Beavers MD  [facility]

## 2020-07-10 NOTE — PATIENT INSTRUCTIONS
Patient Education     Cellulitis  Cellulitis is an infection of the deep layers of skin. A break in the skin, such as a cut or scratch, can let bacteria under the skin. If the bacteria get to deep layers of the skin, it can be serious. If not treated, cellulitis can get into the bloodstream and lymph nodes. The infection can then spread throughout the body. This causes serious illness.  Cellulitis causes the affected skin to become red, swollen, warm, and sore. The reddened areas have a visible border. An open sore may leak fluid (pus). You may have a fever, chills, and pain.  Cellulitis is treated with antibiotics taken for 7 to 10 days. An open sore may be cleaned and covered with cool wet gauze. Symptoms should get better 1 to 2 days after treatment is started. Make sure to take all the antibiotics for the full number of days until they are gone. Keep taking the medicine even if your symptoms go away.  Home care  Follow these tips:    Limit the use of the part of your body with cellulitis.     If the infection is on your leg, keep your leg raised while sitting. This will help to reduce swelling.    Take all of the antibiotic medicine exactly as directed until it is gone. Do not miss any doses, especially during the first 7 days. Don t stop taking the medicine when your symptoms get better.    Keep the affected area clean and dry.    Wash your hands with soap and warm water before and after touching your skin. Anyone else who touches your skin should also wash his or her hands. Don't share towels.  Follow-up care  Follow up with your healthcare provider, or as advised. If your infection does not go away on the first antibiotic, your healthcare provider will prescribe a different one.  When to seek medical advice  Call your healthcare provider right away if any of these occur:    Red areas that spread    Swelling or pain that gets worse    Fluid leaking from the skin (pus)    Fever higher of 100.4  F (38.0  C) or  higher after 2 days on antibiotics  Date Last Reviewed: 9/1/2016 2000-2019 [employer]. [address] [place] [address], [place] [address]. All rights reserved. This information is not intended as a substitute for professional medical care. Always follow your healthcare professional's instructions.

## 2020-07-14 ENCOUNTER — OFFICE VISIT (OUTPATIENT)
Dept: FAMILY MEDICINE | Facility: CLINIC | Age: 55
End: 2020-07-14
Payer: COMMERCIAL

## 2020-07-14 VITALS
RESPIRATION RATE: 18 BRPM | TEMPERATURE: 97.3 F | BODY MASS INDEX: 41.75 KG/M2 | WEIGHT: 315 LBS | SYSTOLIC BLOOD PRESSURE: 130 MMHG | DIASTOLIC BLOOD PRESSURE: 84 MMHG | HEIGHT: 73 IN | HEART RATE: 80 BPM

## 2020-07-14 DIAGNOSIS — L03.115 CELLULITIS OF RIGHT LOWER EXTREMITY: ICD-10-CM

## 2020-07-14 PROCEDURE — 99213 OFFICE O/P EST LOW 20 MIN: CPT | Performed by: FAMILY MEDICINE

## 2020-07-14 RX ORDER — CEPHALEXIN 500 MG/1
500 CAPSULE ORAL 4 TIMES DAILY
Qty: 16 CAPSULE | Refills: 0 | Status: SHIPPED | OUTPATIENT
Start: 2020-07-14 | End: 2020-07-18

## 2020-07-14 RX ORDER — DOXYCYCLINE HYCLATE 100 MG
100 TABLET ORAL 2 TIMES DAILY
Qty: 8 TABLET | Refills: 0 | Status: SHIPPED | OUTPATIENT
Start: 2020-07-14 | End: 2020-07-18

## 2020-07-14 ASSESSMENT — MIFFLIN-ST. JEOR: SCORE: 2450.18

## 2020-07-14 NOTE — PROGRESS NOTES
Subjective     Lupillo Smith is a 55 year old male who presents to clinic today for the following health issues:    HPI   Patient is following up with cellulitis in his right lower leg. He is still taking the antibiotic and thinks it is getting better.  No fever, chills, sob, cough or other relevant systemic symptoms.        Patient Active Problem List   Diagnosis     Prediabetes     Hypothyroidism     Morbid obesity (H)     Urticaria     Hyperlipidemia     Hx of papillary thyroid carcinoma     History of total thyroidectomy     Primary osteoarthritis of right hip     Past Surgical History:   Procedure Laterality Date     BIOPSY  Multiple    Pre-thyroid removal     C ANESTH,DX ARTHROSCOPIC PROC KNEE JOINT Right 04/1985     JOINT REPLACEMENT Left 10/2013    Left hip replacement     ROTATOR CUFF REPAIR RT/LT Right     9/2002- Right shoulder     THYROIDECTOMY  2013       Social History     Tobacco Use     Smoking status: Current Some Day Smoker     Types: Cigars     Smokeless tobacco: Never Used     Tobacco comment: One cigar every three months   Substance Use Topics     Alcohol use: Yes     Comment: rare     Family History   Family history unknown: Yes   Problem Relation Age of Onset     Family history unknown: Yes     Diabetes Maternal Grandmother      Hypertension Father          Current Outpatient Medications   Medication Sig Dispense Refill     cephALEXin (KEFLEX) 500 MG capsule Take 1 capsule (500 mg) by mouth 4 times daily for 10 days 40 capsule 0     doxycycline hyclate (VIBRA-TABS) 100 MG tablet Take 1 tablet (100 mg) by mouth 2 times daily for 10 days 20 tablet 0     hydrOXYzine (ATARAX) 50 MG tablet Take 1 tablet (50 mg) by mouth every 6 hours as needed for itching 120 tablet 3     levothyroxine (SYNTHROID/LEVOTHROID) 137 MCG tablet Take 2 tablets (274 mcg) by mouth daily 180 tablet 3     metFORMIN (GLUCOPHAGE-XR) 750 MG 24 hr tablet Take 2 tablets (1,500 mg) by mouth daily 180 tablet 3     Allergies  "  Allergen Reactions     Oxycodone Nausea and Vomiting     Aspirin Hives     Ibuprofen Hives     Nsaids Swelling     Recent Labs   Lab Test 12/30/19  1333 08/29/18  1745   A1C 5.7* 6.2*   ALT  --  26   CR  --  0.86   GFRESTIMATED  --  >90   GFRESTBLACK  --  >90   POTASSIUM  --  4.3   TSH 0.61 3.56      BP Readings from Last 3 Encounters:   07/14/20 130/84   07/10/20 138/80   07/08/20 126/80    Wt Readings from Last 3 Encounters:   07/14/20 (!) 155.1 kg (342 lb)   07/10/20 (!) 155.6 kg (343 lb)   07/08/20 (!) 155.9 kg (343 lb 12.8 oz)                 Reviewed and updated as needed this visit by Provider         Review of Systems   Constitutional, HEENT, cardiovascular, pulmonary, gi and gu systems are negative, except as otherwise noted.      Objective    /84 (BP Location: Right arm, Cuff Size: Adult Large)   Pulse 80   Temp 97.3  F (36.3  C) (Tympanic)   Resp 18   Ht 1.854 m (6' 1\")   Wt (!) 155.1 kg (342 lb)   BMI 45.12 kg/m    Body mass index is 45.12 kg/m .  Physical Exam   GENERAL: alert and no distress  NECK: no adenopathy, no asymmetry, masses, or scars and thyroid normal to palpation  RESP: lungs clear to auscultation - no rales, rhonchi or wheezes  CV: regular rate and rhythm, normal S1 S2, no S3 or S4, no murmur, click or rub, no peripheral edema and peripheral pulses strong  ABDOMEN: soft, nontender, no hepatosplenomegaly, no masses and bowel sounds normal  SKIN: Right lower erythema improved appearing, pedal edema 2+, Homans sign negative      07/14/2020      07/10/2020            Assessment & Plan     (L03.115) Cellulitis of right lower extremity  Comment: Right lower extremity cellulitis improving satisfactorily.  Suggested to complete doxycycline and cephalexin antibiotic course for complete 14 days.  Differentials discussed in detail including DVT, shared decision made to avoid ultrasound for now.  Continue leg elevation, rest, will be in touch though mychart.  All questions " answered.  Plan: cephALEXin (KEFLEX) 500 MG capsule, doxycycline        hyclate (VIBRA-TABS) 100 MG tablet         Benitez Beavers MD  [facility]

## 2020-07-15 ENCOUNTER — OFFICE VISIT (OUTPATIENT)
Dept: ORTHOPEDICS | Facility: CLINIC | Age: 55
End: 2020-07-15
Payer: COMMERCIAL

## 2020-07-15 DIAGNOSIS — M25.551 HIP PAIN, RIGHT: Primary | ICD-10-CM

## 2020-07-15 DIAGNOSIS — M25.851 RIGHT HIP IMPINGEMENT SYNDROME: ICD-10-CM

## 2020-07-15 PROCEDURE — 99207 ZZC NO CHARGE LOS: CPT | Performed by: FAMILY MEDICINE

## 2020-07-15 NOTE — LETTER
7/15/2020         RE: Lupillo Smith  [address] [place] [place] [address]        Dear Colleague,    Thank you for referring your patient, Lupillo Smith, to the [facility]. Please see a copy of my visit note below.    Lupillo Smith  :  1965  DOS: 2020  MRN: [id number]    Sports Medicine Clinic Procedure    Ultrasound Guided Right Intra-Articular Hip Injection    Clinical History: referred for an injection with Dr. Tabor.  Is currently on antibiotics for cellulitis for his lower leg.  He is currently on antibiotics.      Diagnosis:   1. Hip pain, right    2. Right hip impingement syndrome      Referring Physician: Shiv Tabor MD  Procedures      Impression:  Future planning for right intra-articular hip injection.    Plan:  Follow up 2 weeks, appt made for 2020  Currently on abx for lower leg cellulitis, rescheduled for 1 wk s/p completion of tx  All questions were answered today  Contact us with additional questions or concerns  Signs and sx of concern reviewed      Maximiliano Bowden DO, CAQ  Primary Care Sports Medicine  [facility]           Again, thank you for allowing me to participate in the care of your patient.        Sincerely,        Maximiliano Bowden DO

## 2020-07-20 DIAGNOSIS — Z11.59 ENCOUNTER FOR SCREENING FOR OTHER VIRAL DISEASES: Primary | ICD-10-CM

## 2020-07-30 ENCOUNTER — OFFICE VISIT (OUTPATIENT)
Dept: ORTHOPEDICS | Facility: CLINIC | Age: 55
End: 2020-07-30
Payer: COMMERCIAL

## 2020-07-30 VITALS
DIASTOLIC BLOOD PRESSURE: 80 MMHG | WEIGHT: 315 LBS | SYSTOLIC BLOOD PRESSURE: 132 MMHG | BODY MASS INDEX: 41.75 KG/M2 | HEIGHT: 73 IN

## 2020-07-30 DIAGNOSIS — M25.551 HIP PAIN, RIGHT: Primary | ICD-10-CM

## 2020-07-30 DIAGNOSIS — M16.11 PRIMARY OSTEOARTHRITIS OF RIGHT HIP: ICD-10-CM

## 2020-07-30 PROCEDURE — 20611 DRAIN/INJ JOINT/BURSA W/US: CPT | Mod: RT | Performed by: FAMILY MEDICINE

## 2020-07-30 RX ORDER — TRIAMCINOLONE ACETONIDE 40 MG/ML
40 INJECTION, SUSPENSION INTRA-ARTICULAR; INTRAMUSCULAR
Status: SHIPPED | OUTPATIENT
Start: 2020-07-30

## 2020-07-30 RX ORDER — ROPIVACAINE HYDROCHLORIDE 5 MG/ML
3 INJECTION, SOLUTION EPIDURAL; INFILTRATION; PERINEURAL
Status: SHIPPED | OUTPATIENT
Start: 2020-07-30

## 2020-07-30 RX ADMIN — TRIAMCINOLONE ACETONIDE 40 MG: 40 INJECTION, SUSPENSION INTRA-ARTICULAR; INTRAMUSCULAR at 08:30

## 2020-07-30 RX ADMIN — ROPIVACAINE HYDROCHLORIDE 3 ML: 5 INJECTION, SOLUTION EPIDURAL; INFILTRATION; PERINEURAL at 08:30

## 2020-07-30 ASSESSMENT — MIFFLIN-ST. JEOR: SCORE: 2450.18

## 2020-07-30 NOTE — PROGRESS NOTES
Lupillo Smith  :  1965  DOS: 2020  MRN: [id number]    Sports Medicine Clinic Procedure    Ultrasound Guided Right Intra-Articular Hip Injection    Clinical History: Gradual onset of chronic right hip pain over the past 6+ months.  Previously completed intra-articular hip injections at [facility], most recently in 2019 with good relief for 4 - 5 months.    Diagnosis:   1. Hip pain, right    2. Primary osteoarthritis of right hip      Referring Physician: Shiv Tabor MD; Giuliano Desouza MD  Large Joint Injection/Arthocentesis: R hip joint    Date/Time: 2020 8:30 AM  Performed by: Maximiliano Bowden DO  Authorized by: Maximiliano Bowden DO     Indications:  Pain and diagnostic evaluation  Needle Size comment:  22G x 5in  Guidance: ultrasound    Approach:  Anterior  Location:  Hip      Site:  R hip joint  Medications:  40 mg triamcinolone 40 MG/ML; 3 mL ropivacaine 5 MG/ML  Outcome:  Tolerated well, no immediate complications  Procedure discussed: discussed risks, benefits, and alternatives    Consent Given by:  Patient  Timeout: timeout called immediately prior to procedure    Prep: patient was prepped and draped in usual sterile fashion     4 ml's of 1% lidocaine was used as local anesthetic prior to injection        Impression:  Successful Right intra-articular hip injection.    Plan:  Follow up prn, already consulted with Dr Desouza about BERNARDO, planning for fall  Expectations and goals of CSI reviewed  Often 2-3 days for steroid effect, and can take up to two weeks for maximum effect  We discussed modified progressive pain-free activity as tolerated  Do not overuse in first two weeks if feeling better due to concern for vulnerability while steroid is working  Supportive care reviewed  All questions were answered today  Contact us with additional questions or concerns  Signs and sx of concern reviewed      Maximiliano Bowden DO, CAQ  Primary Care Sports Medicine  [facility]

## 2020-07-30 NOTE — LETTER
2020         RE: Lupillo Smith  [address] [place] [address]        Dear Colleague,    Thank you for referring your patient, Lupillo Smith, to the [facility]. Please see a copy of my visit note below.    Lupillo Smith  :  1965  DOS: 2020  MRN: [id number]    Sports Medicine Clinic Procedure    Ultrasound Guided Right Intra-Articular Hip Injection    Clinical History: Gradual onset of chronic right hip pain over the past 6+ months.  Previously completed intra-articular hip injections at [facility] most recently in 2019 with good relief for 4 - 5 months.    Diagnosis:   1. Hip pain, right    2. Primary osteoarthritis of right hip      Referring Physician: Shiv Tabor MD; Giuliano Desouza MD  Large Joint Injection/Arthocentesis: R hip joint    Date/Time: 2020 8:30 AM  Performed by: Maximiliano Bowden DO  Authorized by: Maximiliano Bowden DO     Indications:  Pain and diagnostic evaluation  Needle Size comment:  22G x 5in  Guidance: ultrasound    Approach:  Anterior  Location:  Hip      Site:  R hip joint  Medications:  40 mg triamcinolone 40 MG/ML; 3 mL ropivacaine 5 MG/ML  Outcome:  Tolerated well, no immediate complications  Procedure discussed: discussed risks, benefits, and alternatives    Consent Given by:  Patient  Timeout: timeout called immediately prior to procedure    Prep: patient was prepped and draped in usual sterile fashion     4 ml's of 1% lidocaine was used as local anesthetic prior to injection        Impression:  Successful Right intra-articular hip injection.    Plan:  Follow up prn, already consulted with Dr Desouza about BERNARDO, planning for fall  Expectations and goals of CSI reviewed  Often 2-3 days for steroid effect, and can take up to two weeks for maximum effect  We discussed modified progressive pain-free activity as tolerated  Do not overuse in first two weeks if feeling better due to concern for  vulnerability while steroid is working  Supportive care reviewed  All questions were answered today  Contact us with additional questions or concerns  Signs and sx of concern reviewed      Maximiliano Bowden DO, CAQ  Primary Care Sports Medicine  [facility]         Again, thank you for allowing me to participate in the care of your patient.        Sincerely,        Maximiliano Bowden DO

## 2020-08-17 ENCOUNTER — OFFICE VISIT (OUTPATIENT)
Dept: FAMILY MEDICINE | Facility: CLINIC | Age: 55
End: 2020-08-17
Payer: COMMERCIAL

## 2020-08-17 VITALS
RESPIRATION RATE: 16 BRPM | DIASTOLIC BLOOD PRESSURE: 89 MMHG | WEIGHT: 315 LBS | OXYGEN SATURATION: 95 % | HEIGHT: 73 IN | SYSTOLIC BLOOD PRESSURE: 134 MMHG | HEART RATE: 79 BPM | TEMPERATURE: 97.6 F | BODY MASS INDEX: 41.75 KG/M2

## 2020-08-17 DIAGNOSIS — R73.03 PREDIABETES: ICD-10-CM

## 2020-08-17 DIAGNOSIS — Z23 NEED FOR VACCINATION: ICD-10-CM

## 2020-08-17 DIAGNOSIS — Z00.00 ROUTINE GENERAL MEDICAL EXAMINATION AT A HEALTH CARE FACILITY: Primary | ICD-10-CM

## 2020-08-17 DIAGNOSIS — E66.01 MORBID OBESITY (H): ICD-10-CM

## 2020-08-17 PROCEDURE — 90472 IMMUNIZATION ADMIN EACH ADD: CPT | Performed by: FAMILY MEDICINE

## 2020-08-17 PROCEDURE — 90715 TDAP VACCINE 7 YRS/> IM: CPT | Performed by: FAMILY MEDICINE

## 2020-08-17 PROCEDURE — 90471 IMMUNIZATION ADMIN: CPT | Performed by: FAMILY MEDICINE

## 2020-08-17 PROCEDURE — 90670 PCV13 VACCINE IM: CPT | Performed by: FAMILY MEDICINE

## 2020-08-17 PROCEDURE — 99396 PREV VISIT EST AGE 40-64: CPT | Mod: 25 | Performed by: FAMILY MEDICINE

## 2020-08-17 ASSESSMENT — MIFFLIN-ST. JEOR: SCORE: 2459.25

## 2020-08-17 NOTE — PROGRESS NOTES
3  SUBJECTIVE:   CC: Lupillo Smith is an 55 year old male who presents for preventive health visit.     Healthy Habits:    Do you get at least three servings of calcium containing foods daily (dairy, green leafy vegetables, etc.)? no, taking calcium and/or vitamin D supplement: no    Amount of exercise or daily activities, outside of work: 2-3 day(s) per week    Problems taking medications regularly No    Medication side effects: No    Have you had an eye exam in the past two years? no    Do you see a dentist twice per year? no    Do you have sleep apnea, excessive snoring or daytime drowsiness?no          Today's PHQ-2 Score:   PHQ-2 ( 1999 [employer]) 8/17/2020 6/4/2019   Q1: Little interest or pleasure in doing things 0 0   Q2: Feeling down, depressed or hopeless 0 0   PHQ-2 Score 0 0       Abuse: Current or Past(Physical, Sexual or Emotional)- No  Do you feel safe in your environment? Yes        Social History     Tobacco Use     Smoking status: Current Some Day Smoker     Types: Cigars     Smokeless tobacco: Never Used     Tobacco comment: One cigar every three months   Substance Use Topics     Alcohol use: Yes     Comment: rare     If you drink alcohol do you typically have >3 drinks per day or >7 drinks per week? No                      Last PSA: No results found for: PSA    Reviewed orders with patient. Reviewed health maintenance and updated orders accordingly -   Labs reviewed in EPIC    Reviewed and updated as needed this visit by clinical staff  Tobacco  Allergies  Meds  Med Hx  Surg Hx  Fam Hx  Soc Hx        Reviewed and updated as needed this visit by Provider            ROS:  CONSTITUTIONAL: NEGATIVE for fever, chills, change in weight  INTEGUMENTARY/SKIN: NEGATIVE for worrisome rashes, moles or lesions  EYES: NEGATIVE for vision changes or irritation  ENT: NEGATIVE for ear, mouth and throat problems  RESP: NEGATIVE for significant cough or SOB  CV: NEGATIVE for chest pain, palpitations or  "peripheral edema  GI: NEGATIVE for nausea, abdominal pain, heartburn, or change in bowel habits   male: negative for dysuria, hematuria, decreased urinary stream, erectile dysfunction, urethral discharge  MUSCULOSKELETAL: NEGATIVE for significant arthralgias or myalgia  NEURO: NEGATIVE for weakness, dizziness or paresthesias  PSYCHIATRIC: NEGATIVE for changes in mood or affect    OBJECTIVE:   /89 (BP Location: Right arm, Patient Position: Chair, Cuff Size: Adult Large)   Pulse 79   Temp 97.6  F (36.4  C) (Tympanic)   Resp 16   Ht 1.854 m (6' 1\")   Wt (!) 156 kg (344 lb)   SpO2 95%   BMI 45.39 kg/m    EXAM:  GENERAL: healthy, alert and no distress  EYES: Eyes grossly normal to inspection, PERRL and conjunctivae and sclerae normal  HENT: ear canals and TM's normal, nose and mouth without ulcers or lesions  NECK: no adenopathy, no asymmetry, masses, or scars and thyroid normal to palpation  RESP: lungs clear to auscultation - no rales, rhonchi or wheezes  CV: regular rate and rhythm, normal S1 S2, no S3 or S4, no murmur, click or rub, no peripheral edema and peripheral pulses strong  ABDOMEN: soft, nontender, no hepatosplenomegaly, no masses and bowel sounds normal  MS: no gross musculoskeletal defects noted, no edema  SKIN: no suspicious lesions or rashes  NEURO: Normal strength and tone, mentation intact and speech normal  PSYCH: mentation appears normal, affect normal/bright    Diagnostic Test Results:  Labs reviewed in Epic    ASSESSMENT/PLAN:   Lupillo was seen today for physical.    Diagnoses and all orders for this visit:    Routine general medical examination at a health care facility    Morbid obesity (H)    Prediabetes        COUNSELING:  Reviewed preventive health counseling, as reflected in patient instructions       Regular exercise       Healthy diet/nutrition    Estimated body mass index is 45.39 kg/m  as calculated from the following:    Height as of this encounter: 1.854 m (6' 1\").    " Weight as of this encounter: 156 kg (344 lb).    Weight management plan: Discussed healthy diet and exercise guidelines     reports that he has been smoking cigars. He has never used smokeless tobacco.  Tobacco Cessation Action Plan: Information offered: Patient not interested at this time    Counseling Resources:  ATP IV Guidelines  Pooled Cohorts Equation Calculator  FRAX Risk Assessment  ICSI Preventive Guidelines  Dietary Guidelines for Americans, 2010  USDA's MyPlate  ASA Prophylaxis  Lung CA Screening    Fer Robledo MD  [facility][note 3]

## 2020-08-19 DIAGNOSIS — Z11.59 ENCOUNTER FOR SCREENING FOR OTHER VIRAL DISEASES: Primary | ICD-10-CM

## 2020-08-25 ENCOUNTER — OFFICE VISIT (OUTPATIENT)
Dept: FAMILY MEDICINE | Facility: CLINIC | Age: 55
End: 2020-08-25
Payer: COMMERCIAL

## 2020-08-25 VITALS
TEMPERATURE: 97.9 F | OXYGEN SATURATION: 96 % | WEIGHT: 315 LBS | HEIGHT: 73 IN | BODY MASS INDEX: 41.75 KG/M2 | SYSTOLIC BLOOD PRESSURE: 142 MMHG | HEART RATE: 83 BPM | DIASTOLIC BLOOD PRESSURE: 88 MMHG | RESPIRATION RATE: 12 BRPM

## 2020-08-25 DIAGNOSIS — R73.03 PREDIABETES: ICD-10-CM

## 2020-08-25 DIAGNOSIS — E89.0 POSTOPERATIVE HYPOTHYROIDISM: ICD-10-CM

## 2020-08-25 DIAGNOSIS — Z01.818 PREOP GENERAL PHYSICAL EXAM: Primary | ICD-10-CM

## 2020-08-25 DIAGNOSIS — E66.01 MORBID OBESITY (H): ICD-10-CM

## 2020-08-25 DIAGNOSIS — I10 BENIGN ESSENTIAL HYPERTENSION: ICD-10-CM

## 2020-08-25 DIAGNOSIS — Z13.6 CARDIOVASCULAR SCREENING; LDL GOAL LESS THAN 130: ICD-10-CM

## 2020-08-25 DIAGNOSIS — M16.11 PRIMARY OSTEOARTHRITIS OF RIGHT HIP: ICD-10-CM

## 2020-08-25 LAB
ANION GAP SERPL CALCULATED.3IONS-SCNC: 1 MMOL/L (ref 3–14)
BUN SERPL-MCNC: 15 MG/DL (ref 7–30)
CALCIUM SERPL-MCNC: 8.8 MG/DL (ref 8.5–10.1)
CHLORIDE SERPL-SCNC: 106 MMOL/L (ref 94–109)
CHOLEST SERPL-MCNC: 200 MG/DL
CO2 SERPL-SCNC: 31 MMOL/L (ref 20–32)
CREAT SERPL-MCNC: 0.83 MG/DL (ref 0.66–1.25)
ERYTHROCYTE [DISTWIDTH] IN BLOOD BY AUTOMATED COUNT: 13.4 % (ref 10–15)
GFR SERPL CREATININE-BSD FRML MDRD: >90 ML/MIN/{1.73_M2}
GLUCOSE SERPL-MCNC: 100 MG/DL (ref 70–99)
HBA1C MFR BLD: 5.9 % (ref 0–5.6)
HCT VFR BLD AUTO: 43.5 % (ref 40–53)
HDLC SERPL-MCNC: 54 MG/DL
HGB BLD-MCNC: 14.1 G/DL (ref 13.3–17.7)
LDLC SERPL CALC-MCNC: 130 MG/DL
MCH RBC QN AUTO: 28.9 PG (ref 26.5–33)
MCHC RBC AUTO-ENTMCNC: 32.4 G/DL (ref 31.5–36.5)
MCV RBC AUTO: 89 FL (ref 78–100)
MRSA DNA SPEC QL NAA+PROBE: NEGATIVE
NONHDLC SERPL-MCNC: 146 MG/DL
PLATELET # BLD AUTO: 252 10E9/L (ref 150–450)
POTASSIUM SERPL-SCNC: 4.3 MMOL/L (ref 3.4–5.3)
RBC # BLD AUTO: 4.88 10E12/L (ref 4.4–5.9)
SODIUM SERPL-SCNC: 138 MMOL/L (ref 133–144)
SPECIMEN SOURCE: NORMAL
TRIGL SERPL-MCNC: 80 MG/DL
WBC # BLD AUTO: 7.6 10E9/L (ref 4–11)

## 2020-08-25 PROCEDURE — 99214 OFFICE O/P EST MOD 30 MIN: CPT | Performed by: NURSE PRACTITIONER

## 2020-08-25 PROCEDURE — 80061 LIPID PANEL: CPT | Performed by: NURSE PRACTITIONER

## 2020-08-25 PROCEDURE — 36415 COLL VENOUS BLD VENIPUNCTURE: CPT | Performed by: NURSE PRACTITIONER

## 2020-08-25 PROCEDURE — 87641 MR-STAPH DNA AMP PROBE: CPT | Performed by: NURSE PRACTITIONER

## 2020-08-25 PROCEDURE — 93000 ELECTROCARDIOGRAM COMPLETE: CPT | Performed by: NURSE PRACTITIONER

## 2020-08-25 PROCEDURE — 80048 BASIC METABOLIC PNL TOTAL CA: CPT | Performed by: NURSE PRACTITIONER

## 2020-08-25 PROCEDURE — 85027 COMPLETE CBC AUTOMATED: CPT | Performed by: NURSE PRACTITIONER

## 2020-08-25 PROCEDURE — 83036 HEMOGLOBIN GLYCOSYLATED A1C: CPT | Performed by: NURSE PRACTITIONER

## 2020-08-25 PROCEDURE — 87640 STAPH A DNA AMP PROBE: CPT | Mod: 59 | Performed by: NURSE PRACTITIONER

## 2020-08-25 RX ORDER — LISINOPRIL 5 MG/1
5 TABLET ORAL DAILY
Qty: 30 TABLET | Refills: 1 | Status: SHIPPED | OUTPATIENT
Start: 2020-08-25 | End: 2021-02-22

## 2020-08-25 ASSESSMENT — MIFFLIN-ST. JEOR: SCORE: 2468.33

## 2020-08-25 NOTE — RESULT ENCOUNTER NOTE
Trinidad Wesley    Your A1C is stable in the prediabetes range. Cholesterol levels are just slightly elevated. Recommend heart healthy diet and recheck in 1 year. You can visit: [email or web address]  For some information on a healthy diet.     MRSA screening is still pending.        Please let us know if you have any questions.     Take care,    CRYSTAL Aviles CNP

## 2020-08-25 NOTE — PATIENT INSTRUCTIONS

## 2020-08-25 NOTE — PROGRESS NOTES
[facility] [place]  [address]  [facility] [address]  Phone: [phone]  Primary Provider: No Ref-Primary, Physician  Pre-op Performing Provider: KRISTA JACQUES    PREOPERATIVE EVALUATION:  Today's date: 8/25/2020    Lupillo Smith is a 55 year old male who presents for a preoperative evaluation.    Surgical Information:  Surgery Details 8/25/2020   Surgery/Procedure: R total hip arthroplasty   Surgery Location: [facility] OR   Surgeon: Dr. Desouza   Surgery Date: 9/3/20   Time of Surgery: 1530   Where patient plans to recover: At home with family     Fax number for surgical facility: Note does not need to be faxed, will be available electronically in Epic.  Type of Anesthesia Anticipated: Spinal    Subjective     HPI related to upcoming procedure: Persistent pain in right hip due to primary osteoarthritis despite conservative measures.     Preop Questions 8/25/2020   Have you ever had a heart attack or stroke? No   Have you ever had surgery on your heart or blood vessels, such as a stent placement, a coronary artery bypass, or surgery on an artery in your head, neck, heart, or legs? No   Do you have chest pain with activity? No   Do you have a history of  heart failure? No   Do you currently have a cold, bronchitis or symptoms of other infection? No   Do you have a cough, shortness of breath, or wheezing? No   Do you or anyone in your family have previous history of blood clots? No   Do you or does anyone in your family have a serious bleeding problem such as prolonged bleeding following surgeries or cuts? No   Have you ever had problems with anemia or been told to take iron pills? No   Have you had any abnormal blood loss such as black, tarry or bloody stools? No   Have you ever had a blood transfusion? No   Are you willing to have a blood transfusion if it is medically needed before, during, or after your surgery? Yes   Have you or any of your relatives ever had problems  with anesthesia? No   Do you have sleep apnea, excessive snoring or daytime drowsiness? No   Do you have any artifical heart valves or other implanted medical devices like a pacemaker, defibrillator, or continuous glucose monitor? No   Do you have artificial joints? YES - left hip    Are you allergic to latex? No     Patient does not have a Health Care Directive or Living Will: Discussed advance care planning with patient; however, patient declined at this time.    RX [place] ([place] reviewed:  reviewed- no concerns    DIABETES - Patient has a longstanding history of pre-diabetes. Patient is being treated with oral agents and denies significant side effects. Control has been good. Complicating factors include but are not limited to: hypertension and morbid obesity .     HYPERTENSION - Patient has longstanding history of PRED- HTN , currently denies any symptoms referable to elevated blood pressure. Specifically denies chest pain, palpitations, dyspnea, orthopnea, PND or peripheral edema. Blood pressure readings have not been in normal range. Patient will be started on an antihypertensive today.     HYPOTHYROIDISM - Patient has a longstanding history of chronic Hypothyroidism, postoperative due to nodules. Patient has been doing well, noting no tremor, insomnia, hair loss or changes in skin texture. Continues to take medications as directed, without adverse reactions or side effects. Last TSH   Lab Results   Component Value Date    TSH 0.61 12/30/2019   .        Review of Systems  CONSTITUTIONAL: NEGATIVE for fever, chills, change in weight  INTEGUMENTARY/SKIN: NEGATIVE for worrisome rashes, moles or lesions  EYES: NEGATIVE for vision changes or irritation  ENT/MOUTH: NEGATIVE for ear, mouth and throat problems  RESP: NEGATIVE for significant cough or SOB  CV: NEGATIVE for chest pain, palpitations or peripheral edema  GI: NEGATIVE for nausea, abdominal pain, heartburn, or change in bowel habits  :  NEGATIVE for frequency, dysuria, or hematuria  MUSCULOSKELETAL: NEGATIVE for significant arthralgias or myalgia  NEURO: NEGATIVE for weakness, dizziness or paresthesias  ENDOCRINE: NEGATIVE for temperature intolerance, skin/hair changes  HEME: NEGATIVE for bleeding problems  PSYCHIATRIC: NEGATIVE for changes in mood or affect    Patient Active Problem List    Diagnosis Date Noted     Primary osteoarthritis of right hip 06/17/2020     Priority: Medium     Morbid obesity (H) 06/04/2019     Priority: Medium     Hypothyroidism 11/06/2018     Priority: Medium     Prediabetes 08/29/2018     Priority: Medium     Hx of papillary thyroid carcinoma 05/02/2016     Priority: Medium     History of total thyroidectomy 09/17/2013     Priority: Medium     Hyperlipidemia 06/27/2007     Priority: Medium     Urticaria 05/22/2007     Priority: Medium     Overview:   Urticaria Chronic        Past Medical History:   Diagnosis Date     Arthritis Hips, currently the right hip     Cancer (H) Small amount of cancer detected when i had thyroid     Hx of thyroid cancer 2013     Thyroid disease Thyroidectomy July 2013     Urticaria      Past Surgical History:   Procedure Laterality Date     BIOPSY  Multiple    Pre-thyroid removal     C ANESTH,DX ARTHROSCOPIC PROC KNEE JOINT Right 04/1985     JOINT REPLACEMENT Left 10/2013    Left hip replacement     ROTATOR CUFF REPAIR RT/LT Right     9/2002- Right shoulder     THYROIDECTOMY  2013     Current Outpatient Medications   Medication Sig Dispense Refill     hydrOXYzine (ATARAX) 50 MG tablet Take 1 tablet (50 mg) by mouth every 6 hours as needed for itching 120 tablet 3     levothyroxine (SYNTHROID/LEVOTHROID) 137 MCG tablet Take 2 tablets (274 mcg) by mouth daily 180 tablet 3     lisinopril (ZESTRIL) 5 MG tablet Take 1 tablet (5 mg) by mouth daily 30 tablet 1     metFORMIN (GLUCOPHAGE-XR) 750 MG 24 hr tablet Take 2 tablets (1,500 mg) by mouth daily 180 tablet 3       Allergies   Allergen Reactions  "    Oxycodone Nausea and Vomiting     Aspirin Hives     Ibuprofen Hives     Nsaids Swelling        Social History     Tobacco Use     Smoking status: Current Some Day Smoker     Types: Cigars     Smokeless tobacco: Never Used     Tobacco comment: One cigar every three months   Substance Use Topics     Alcohol use: Yes     Comment: rare     Family History   Family history unknown: Yes   Problem Relation Age of Onset     Family history unknown: Yes     Diabetes Maternal Grandmother      Hypertension Father      History   Drug Use No            Objective   BP (!) 142/88   Pulse 83   Temp 97.9  F (36.6  C) (Tympanic)   Resp 12   Ht 1.854 m (6' 1\")   Wt (!) 156.9 kg (346 lb)   SpO2 96%   BMI 45.65 kg/m    Physical Exam    GENERAL APPEARANCE: healthy, alert and no distress     EYES: EOMI,  PERRL     HENT: ear canals and TM's normal and nose and mouth without ulcers or lesions     NECK: no adenopathy, no asymmetry, masses, or scars and thyroid normal to palpation     RESP: lungs clear to auscultation - no rales, rhonchi or wheezes     CV: regular rates and rhythm, normal S1 S2, no S3 or S4 and no murmur, click or rub     ABDOMEN:  soft, nontender, no HSM or masses and bowel sounds normal     MS: extremities normal- no gross deformities noted, no evidence of inflammation in joints, FROM in all extremities.     SKIN: no suspicious lesions or rashes, scattered fatty tumors on abdomen and upper extremities     NEURO: Normal strength and tone, sensory exam grossly normal, mentation intact and speech normal     PSYCH: mentation appears normal. and affect normal/bright     LYMPHATICS: No cervical adenopathy    Recent Labs   Lab Test 12/30/19  1333 08/29/18  1745   HGB  --  14.5   PLT  --  302   NA  --  138   POTASSIUM  --  4.3   CR  --  0.86   A1C 5.7* 6.2*        PRE-OP Diagnostics:  Labs pending at this time.  Results will be reviewed when available.  Recent Results (from the past 24 hour(s))   Hemoglobin A1c    " Collection Time: 08/25/20 11:42 AM   Result Value Ref Range    Hemoglobin A1C 5.9 (H) 0 - 5.6 %   CBC with platelets    Collection Time: 08/25/20 11:42 AM   Result Value Ref Range    WBC 7.6 4.0 - 11.0 10e9/L    RBC Count 4.88 4.4 - 5.9 10e12/L    Hemoglobin 14.1 13.3 - 17.7 g/dL    Hematocrit 43.5 40.0 - 53.0 %    MCV 89 78 - 100 fl    MCH 28.9 26.5 - 33.0 pg    MCHC 32.4 31.5 - 36.5 g/dL    RDW 13.4 10.0 - 15.0 %    Platelet Count 252 150 - 450 10e9/L     EKG required for HTN and morbid obesity and not completed in the last 90 days.   EKG: appears normal, NSR, normal axis, normal intervals, no acute ST/T changes c/w ischemia, unchanged from previous tracings         Assessment & Plan   The proposed surgical procedure is considered INTERMEDIATE risk.    REVISED CARDIAC RISK INDEX  The patient has the following serious cardiovascular risks for perioperative complications:  No serious cardiac risks = 0 points    INTERPRETATION: 0 points: Class I (very low risk - 0.4% complication rate)       1. Preop general physical exam    - CBC with platelets  - Basic metabolic panel  (Ca, Cl, CO2, Creat, Gluc, K, Na, BUN)  - Methicillin Resistant Staph Aureus PCR  - EKG 12-lead complete w/read - Clinics    2. Primary osteoarthritis of right hip      3. Prediabetes    - Hemoglobin A1c    4. CARDIOVASCULAR SCREENING; LDL GOAL LESS THAN 130    - Lipid panel reflex to direct LDL Fasting    5. Benign essential hypertension    - lisinopril (ZESTRIL) 5 MG tablet; Take 1 tablet (5 mg) by mouth daily  Dispense: 30 tablet; Refill: 1    6. Morbid obesity (H)    - EKG 12-lead complete w/read - Clinics    7. Postoperative hypothyroidism    - Controlled, followed by Endocrine.      The patient has the following additional risks and recommendations for perioperative complications:     - Morbid obesity (BMI >40)     MEDICATION INSTRUCTIONS:  Patient is to take all scheduled medications on the day of surgery EXCEPT for modifications listed  below:    DIABETIC Medications:   - METFORMIN: HOLD day of surgery.    RECOMMENDATION:  APPROVAL GIVEN to proceed with proposed procedure, without further diagnostic evaluation.    No follow-ups on file.    Signed Electronically by: CRYSTAL Laura CNP    Copy of this evaluation report is provided to requesting physician.    Preop [facility] Preop Guidelines    Revised Cardiac Risk Index

## 2020-08-26 NOTE — RESULT ENCOUNTER NOTE
Trinidad Wesley    Your MRSA screening is negative. Please let us know if you have any questions.     Take care,    CRYSTAL Aviles CNP

## 2020-08-30 DIAGNOSIS — Z11.59 ENCOUNTER FOR SCREENING FOR OTHER VIRAL DISEASES: ICD-10-CM

## 2020-08-30 PROCEDURE — U0003 INFECTIOUS AGENT DETECTION BY NUCLEIC ACID (DNA OR RNA); SEVERE ACUTE RESPIRATORY SYNDROME CORONAVIRUS 2 (SARS-COV-2) (CORONAVIRUS DISEASE [COVID-19]), AMPLIFIED PROBE TECHNIQUE, MAKING USE OF HIGH THROUGHPUT TECHNOLOGIES AS DESCRIBED BY CMS-2020-01-R: HCPCS | Performed by: ORTHOPAEDIC SURGERY

## 2020-08-31 ENCOUNTER — ALLIED HEALTH/NURSE VISIT (OUTPATIENT)
Dept: FAMILY MEDICINE | Facility: CLINIC | Age: 55
End: 2020-08-31
Payer: COMMERCIAL

## 2020-08-31 VITALS — DIASTOLIC BLOOD PRESSURE: 82 MMHG | HEART RATE: 84 BPM | SYSTOLIC BLOOD PRESSURE: 130 MMHG

## 2020-08-31 DIAGNOSIS — I10 BENIGN ESSENTIAL HYPERTENSION: Primary | ICD-10-CM

## 2020-08-31 LAB
SARS-COV-2 RNA SPEC QL NAA+PROBE: NOT DETECTED
SPECIMEN SOURCE: NORMAL

## 2020-08-31 PROCEDURE — 99207 ZZC NO CHARGE NURSE ONLY: CPT

## 2020-08-31 NOTE — PROGRESS NOTES
B/P check.  Lisinopril 5 mg every day added last week when B/P was elevated @ Preop appt for total hip procedure.  130/82, P 84.  KPavelRN

## 2020-09-01 ENCOUNTER — ANESTHESIA EVENT (OUTPATIENT)
Dept: SURGERY | Facility: CLINIC | Age: 55
End: 2020-09-01
Payer: COMMERCIAL

## 2020-09-01 PROBLEM — I10 BENIGN ESSENTIAL HYPERTENSION: Status: ACTIVE | Noted: 2020-09-01

## 2020-09-03 ENCOUNTER — APPOINTMENT (OUTPATIENT)
Dept: GENERAL RADIOLOGY | Facility: CLINIC | Age: 55
End: 2020-09-03
Attending: ORTHOPAEDIC SURGERY
Payer: COMMERCIAL

## 2020-09-03 ENCOUNTER — HOSPITAL ENCOUNTER (OUTPATIENT)
Facility: CLINIC | Age: 55
Discharge: HOME | End: 2020-09-04
Attending: ORTHOPAEDIC SURGERY | Admitting: ORTHOPAEDIC SURGERY
Payer: COMMERCIAL

## 2020-09-03 ENCOUNTER — SURGERY (OUTPATIENT)
Age: 55
End: 2020-09-03
Payer: COMMERCIAL

## 2020-09-03 ENCOUNTER — ANESTHESIA (OUTPATIENT)
Dept: SURGERY | Facility: CLINIC | Age: 55
End: 2020-09-03
Payer: COMMERCIAL

## 2020-09-03 DIAGNOSIS — Z96.641 STATUS POST TOTAL REPLACEMENT OF RIGHT HIP: ICD-10-CM

## 2020-09-03 DIAGNOSIS — M16.11 PRIMARY OSTEOARTHRITIS OF RIGHT HIP: Primary | ICD-10-CM

## 2020-09-03 PROBLEM — Z96.649 S/P TOTAL HIP ARTHROPLASTY: Status: ACTIVE | Noted: 2020-09-03

## 2020-09-03 LAB
GLUCOSE BLDC GLUCOMTR-MCNC: 111 MG/DL (ref 70–99)
GLUCOSE BLDC GLUCOMTR-MCNC: 158 MG/DL (ref 70–99)

## 2020-09-03 PROCEDURE — 25000132 ZZH RX MED GY IP 250 OP 250 PS 637: Performed by: NURSE ANESTHETIST, CERTIFIED REGISTERED

## 2020-09-03 PROCEDURE — 25800030 ZZH RX IP 258 OP 636: Performed by: NURSE ANESTHETIST, CERTIFIED REGISTERED

## 2020-09-03 PROCEDURE — 71000013 ZZH RECOVERY PHASE 1 LEVEL 1 EA ADDTL HR: Performed by: ORTHOPAEDIC SURGERY

## 2020-09-03 PROCEDURE — 25000128 H RX IP 250 OP 636: Performed by: NURSE ANESTHETIST, CERTIFIED REGISTERED

## 2020-09-03 PROCEDURE — 27110028 ZZH OR GENERAL SUPPLY NON-STERILE: Performed by: ORTHOPAEDIC SURGERY

## 2020-09-03 PROCEDURE — 27210794 ZZH OR GENERAL SUPPLY STERILE: Performed by: ORTHOPAEDIC SURGERY

## 2020-09-03 PROCEDURE — 40000306 ZZH STATISTIC PRE PROC ASSESS II: Performed by: ORTHOPAEDIC SURGERY

## 2020-09-03 PROCEDURE — 25000128 H RX IP 250 OP 636: Performed by: PHYSICIAN ASSISTANT

## 2020-09-03 PROCEDURE — C1713 ANCHOR/SCREW BN/BN,TIS/BN: HCPCS | Performed by: ORTHOPAEDIC SURGERY

## 2020-09-03 PROCEDURE — 25000128 H RX IP 250 OP 636: Performed by: ORTHOPAEDIC SURGERY

## 2020-09-03 PROCEDURE — 40000986 XR PELVIS AD HIP PORTABLE RIGHT 1 VIEW

## 2020-09-03 PROCEDURE — 25000132 ZZH RX MED GY IP 250 OP 250 PS 637: Performed by: PHYSICIAN ASSISTANT

## 2020-09-03 PROCEDURE — 71000012 ZZH RECOVERY PHASE 1 LEVEL 1 FIRST HR: Performed by: ORTHOPAEDIC SURGERY

## 2020-09-03 PROCEDURE — 37000009 ZZH ANESTHESIA TECHNICAL FEE, EACH ADDTL 15 MIN: Performed by: ORTHOPAEDIC SURGERY

## 2020-09-03 PROCEDURE — 36000063 ZZH SURGERY LEVEL 4 EA 15 ADDTL MIN: Performed by: ORTHOPAEDIC SURGERY

## 2020-09-03 PROCEDURE — 36000093 ZZH SURGERY LEVEL 4 1ST 30 MIN: Performed by: ORTHOPAEDIC SURGERY

## 2020-09-03 PROCEDURE — 25800030 ZZH RX IP 258 OP 636: Performed by: ORTHOPAEDIC SURGERY

## 2020-09-03 PROCEDURE — 25000128 H RX IP 250 OP 636: Performed by: REGISTERED NURSE

## 2020-09-03 PROCEDURE — 37000008 ZZH ANESTHESIA TECHNICAL FEE, 1ST 30 MIN: Performed by: ORTHOPAEDIC SURGERY

## 2020-09-03 PROCEDURE — 82962 GLUCOSE BLOOD TEST: CPT

## 2020-09-03 PROCEDURE — 25800030 ZZH RX IP 258 OP 636: Performed by: REGISTERED NURSE

## 2020-09-03 PROCEDURE — 25000125 ZZHC RX 250: Performed by: NURSE ANESTHETIST, CERTIFIED REGISTERED

## 2020-09-03 PROCEDURE — 25000125 ZZHC RX 250: Performed by: REGISTERED NURSE

## 2020-09-03 PROCEDURE — C1776 JOINT DEVICE (IMPLANTABLE): HCPCS | Performed by: ORTHOPAEDIC SURGERY

## 2020-09-03 DEVICE — IMP CUP ACE PINNACLE 60MM 1217-22-060: Type: IMPLANTABLE DEVICE | Site: HIP | Status: FUNCTIONAL

## 2020-09-03 DEVICE — IMPLANTABLE DEVICE: Type: IMPLANTABLE DEVICE | Site: HIP | Status: FUNCTIONAL

## 2020-09-03 DEVICE — IMP SCR BONE CAN ACE 6.5X25MM 1217-25-500: Type: IMPLANTABLE DEVICE | Site: HIP | Status: FUNCTIONAL

## 2020-09-03 RX ORDER — HYDROXYZINE HYDROCHLORIDE 25 MG/1
25-50 TABLET, FILM COATED ORAL EVERY 6 HOURS PRN
Status: DISCONTINUED | OUTPATIENT
Start: 2020-09-03 | End: 2020-09-04 | Stop reason: HOSPADM

## 2020-09-03 RX ORDER — TRANEXAMIC ACID 650 MG/1
1950 TABLET ORAL ONCE
Status: COMPLETED | OUTPATIENT
Start: 2020-09-03 | End: 2020-09-03

## 2020-09-03 RX ORDER — CEFAZOLIN SODIUM 1 G/3ML
1 INJECTION, POWDER, FOR SOLUTION INTRAMUSCULAR; INTRAVENOUS SEE ADMIN INSTRUCTIONS
Status: DISCONTINUED | OUTPATIENT
Start: 2020-09-03 | End: 2020-09-03 | Stop reason: HOSPADM

## 2020-09-03 RX ORDER — FENTANYL CITRATE 50 UG/ML
INJECTION, SOLUTION INTRAMUSCULAR; INTRAVENOUS PRN
Status: DISCONTINUED | OUTPATIENT
Start: 2020-09-03 | End: 2020-09-03

## 2020-09-03 RX ORDER — FENTANYL CITRATE 50 UG/ML
25-50 INJECTION, SOLUTION INTRAMUSCULAR; INTRAVENOUS
Status: DISCONTINUED | OUTPATIENT
Start: 2020-09-03 | End: 2020-09-04 | Stop reason: HOSPADM

## 2020-09-03 RX ORDER — SODIUM CHLORIDE, SODIUM LACTATE, POTASSIUM CHLORIDE, CALCIUM CHLORIDE 600; 310; 30; 20 MG/100ML; MG/100ML; MG/100ML; MG/100ML
INJECTION, SOLUTION INTRAVENOUS CONTINUOUS
Status: DISCONTINUED | OUTPATIENT
Start: 2020-09-03 | End: 2020-09-04 | Stop reason: HOSPADM

## 2020-09-03 RX ORDER — GABAPENTIN 300 MG/1
300 CAPSULE ORAL ONCE
Status: COMPLETED | OUTPATIENT
Start: 2020-09-03 | End: 2020-09-03

## 2020-09-03 RX ORDER — LISINOPRIL 5 MG/1
5 TABLET ORAL DAILY
Status: DISCONTINUED | OUTPATIENT
Start: 2020-09-03 | End: 2020-09-04 | Stop reason: HOSPADM

## 2020-09-03 RX ORDER — DEXAMETHASONE SODIUM PHOSPHATE 4 MG/ML
INJECTION, SOLUTION INTRA-ARTICULAR; INTRALESIONAL; INTRAMUSCULAR; INTRAVENOUS; SOFT TISSUE PRN
Status: DISCONTINUED | OUTPATIENT
Start: 2020-09-03 | End: 2020-09-03

## 2020-09-03 RX ORDER — HYDROCODONE BITARTRATE AND ACETAMINOPHEN 5; 325 MG/1; MG/1
1-2 TABLET ORAL EVERY 4 HOURS PRN
Status: DISCONTINUED | OUTPATIENT
Start: 2020-09-03 | End: 2020-09-04 | Stop reason: HOSPADM

## 2020-09-03 RX ORDER — OXYCODONE HYDROCHLORIDE 5 MG/1
5-10 TABLET ORAL
Qty: 56 TABLET | Refills: 0 | Status: CANCELLED | OUTPATIENT
Start: 2020-09-03

## 2020-09-03 RX ORDER — PROPOFOL 10 MG/ML
INJECTION, EMULSION INTRAVENOUS CONTINUOUS PRN
Status: DISCONTINUED | OUTPATIENT
Start: 2020-09-03 | End: 2020-09-03

## 2020-09-03 RX ORDER — FENTANYL CITRATE 50 UG/ML
25-50 INJECTION, SOLUTION INTRAMUSCULAR; INTRAVENOUS
Status: DISCONTINUED | OUTPATIENT
Start: 2020-09-03 | End: 2020-09-03 | Stop reason: HOSPADM

## 2020-09-03 RX ORDER — ONDANSETRON 2 MG/ML
4 INJECTION INTRAMUSCULAR; INTRAVENOUS EVERY 30 MIN PRN
Status: DISCONTINUED | OUTPATIENT
Start: 2020-09-03 | End: 2020-09-03 | Stop reason: HOSPADM

## 2020-09-03 RX ORDER — OXYCODONE HYDROCHLORIDE 5 MG/1
5-10 TABLET ORAL
Status: DISCONTINUED | OUTPATIENT
Start: 2020-09-03 | End: 2020-09-04 | Stop reason: HOSPADM

## 2020-09-03 RX ORDER — NALOXONE HYDROCHLORIDE 0.4 MG/ML
.1-.4 INJECTION, SOLUTION INTRAMUSCULAR; INTRAVENOUS; SUBCUTANEOUS
Status: DISCONTINUED | OUTPATIENT
Start: 2020-09-03 | End: 2020-09-04 | Stop reason: HOSPADM

## 2020-09-03 RX ORDER — MAGNESIUM SULFATE HEPTAHYDRATE 40 MG/ML
2 INJECTION, SOLUTION INTRAVENOUS ONCE
Status: COMPLETED | OUTPATIENT
Start: 2020-09-03 | End: 2020-09-03

## 2020-09-03 RX ORDER — NALOXONE HYDROCHLORIDE 0.4 MG/ML
.1-.4 INJECTION, SOLUTION INTRAMUSCULAR; INTRAVENOUS; SUBCUTANEOUS
Status: DISCONTINUED | OUTPATIENT
Start: 2020-09-03 | End: 2020-09-03 | Stop reason: HOSPADM

## 2020-09-03 RX ORDER — MEPERIDINE HYDROCHLORIDE 25 MG/ML
12.5 INJECTION INTRAMUSCULAR; INTRAVENOUS; SUBCUTANEOUS
Status: DISCONTINUED | OUTPATIENT
Start: 2020-09-03 | End: 2020-09-03 | Stop reason: HOSPADM

## 2020-09-03 RX ORDER — METOCLOPRAMIDE 10 MG/1
10 TABLET ORAL EVERY 6 HOURS PRN
Status: DISCONTINUED | OUTPATIENT
Start: 2020-09-03 | End: 2020-09-04 | Stop reason: HOSPADM

## 2020-09-03 RX ORDER — HYDROMORPHONE HYDROCHLORIDE 1 MG/ML
.3-.5 INJECTION, SOLUTION INTRAMUSCULAR; INTRAVENOUS; SUBCUTANEOUS
Status: DISCONTINUED | OUTPATIENT
Start: 2020-09-03 | End: 2020-09-04 | Stop reason: HOSPADM

## 2020-09-03 RX ORDER — PROCHLORPERAZINE MALEATE 5 MG
10 TABLET ORAL EVERY 6 HOURS PRN
Status: DISCONTINUED | OUTPATIENT
Start: 2020-09-03 | End: 2020-09-04 | Stop reason: HOSPADM

## 2020-09-03 RX ORDER — SODIUM CHLORIDE, SODIUM LACTATE, POTASSIUM CHLORIDE, CALCIUM CHLORIDE 600; 310; 30; 20 MG/100ML; MG/100ML; MG/100ML; MG/100ML
INJECTION, SOLUTION INTRAVENOUS CONTINUOUS
Status: DISCONTINUED | OUTPATIENT
Start: 2020-09-03 | End: 2020-09-03 | Stop reason: HOSPADM

## 2020-09-03 RX ORDER — ONDANSETRON 4 MG/1
4 TABLET, ORALLY DISINTEGRATING ORAL EVERY 30 MIN PRN
Status: DISCONTINUED | OUTPATIENT
Start: 2020-09-03 | End: 2020-09-03 | Stop reason: HOSPADM

## 2020-09-03 RX ORDER — DEXAMETHASONE SODIUM PHOSPHATE 4 MG/ML
4 INJECTION, SOLUTION INTRA-ARTICULAR; INTRALESIONAL; INTRAMUSCULAR; INTRAVENOUS; SOFT TISSUE ONCE
Status: COMPLETED | OUTPATIENT
Start: 2020-09-03 | End: 2020-09-03

## 2020-09-03 RX ORDER — LIDOCAINE 40 MG/G
CREAM TOPICAL
Status: DISCONTINUED | OUTPATIENT
Start: 2020-09-03 | End: 2020-09-03 | Stop reason: HOSPADM

## 2020-09-03 RX ORDER — ONDANSETRON 2 MG/ML
4 INJECTION INTRAMUSCULAR; INTRAVENOUS EVERY 6 HOURS PRN
Status: DISCONTINUED | OUTPATIENT
Start: 2020-09-03 | End: 2020-09-04 | Stop reason: HOSPADM

## 2020-09-03 RX ORDER — ACETAMINOPHEN 325 MG/1
975 TABLET ORAL EVERY 8 HOURS
Status: DISCONTINUED | OUTPATIENT
Start: 2020-09-03 | End: 2020-09-03

## 2020-09-03 RX ORDER — CEFAZOLIN SODIUM IN 0.9 % NACL 3 G/100 ML
3 INTRAVENOUS SOLUTION, PIGGYBACK (ML) INTRAVENOUS
Status: COMPLETED | OUTPATIENT
Start: 2020-09-03 | End: 2020-09-03

## 2020-09-03 RX ORDER — CEFAZOLIN SODIUM 2 G/100ML
2 INJECTION, SOLUTION INTRAVENOUS EVERY 8 HOURS
Status: COMPLETED | OUTPATIENT
Start: 2020-09-03 | End: 2020-09-04

## 2020-09-03 RX ORDER — KETAMINE HYDROCHLORIDE 10 MG/ML
INJECTION, SOLUTION INTRAMUSCULAR; INTRAVENOUS PRN
Status: DISCONTINUED | OUTPATIENT
Start: 2020-09-03 | End: 2020-09-03

## 2020-09-03 RX ORDER — NICOTINE POLACRILEX 4 MG
15-30 LOZENGE BUCCAL
Status: DISCONTINUED | OUTPATIENT
Start: 2020-09-03 | End: 2020-09-04 | Stop reason: HOSPADM

## 2020-09-03 RX ORDER — DEXTROSE MONOHYDRATE 25 G/50ML
25-50 INJECTION, SOLUTION INTRAVENOUS
Status: DISCONTINUED | OUTPATIENT
Start: 2020-09-03 | End: 2020-09-04 | Stop reason: HOSPADM

## 2020-09-03 RX ORDER — ONDANSETRON 4 MG/1
4 TABLET, ORALLY DISINTEGRATING ORAL EVERY 6 HOURS PRN
Status: DISCONTINUED | OUTPATIENT
Start: 2020-09-03 | End: 2020-09-04 | Stop reason: HOSPADM

## 2020-09-03 RX ORDER — ACETAMINOPHEN 325 MG/1
975 TABLET ORAL ONCE
Status: COMPLETED | OUTPATIENT
Start: 2020-09-03 | End: 2020-09-03

## 2020-09-03 RX ORDER — METOCLOPRAMIDE HYDROCHLORIDE 5 MG/ML
10 INJECTION INTRAMUSCULAR; INTRAVENOUS EVERY 6 HOURS PRN
Status: DISCONTINUED | OUTPATIENT
Start: 2020-09-03 | End: 2020-09-04 | Stop reason: HOSPADM

## 2020-09-03 RX ORDER — ONDANSETRON 2 MG/ML
INJECTION INTRAMUSCULAR; INTRAVENOUS PRN
Status: DISCONTINUED | OUTPATIENT
Start: 2020-09-03 | End: 2020-09-03

## 2020-09-03 RX ORDER — LIDOCAINE 40 MG/G
CREAM TOPICAL
Status: DISCONTINUED | OUTPATIENT
Start: 2020-09-03 | End: 2020-09-04 | Stop reason: HOSPADM

## 2020-09-03 RX ORDER — CALCIUM CARBONATE 500 MG/1
1000 TABLET, CHEWABLE ORAL 4 TIMES DAILY PRN
Status: DISCONTINUED | OUTPATIENT
Start: 2020-09-03 | End: 2020-09-04 | Stop reason: HOSPADM

## 2020-09-03 RX ADMIN — PHENYLEPHRINE HYDROCHLORIDE 150 MCG: 10 INJECTION INTRAVENOUS at 12:50

## 2020-09-03 RX ADMIN — MIDAZOLAM HYDROCHLORIDE 3 MG: 1 INJECTION, SOLUTION INTRAMUSCULAR; INTRAVENOUS at 12:29

## 2020-09-03 RX ADMIN — FENTANYL CITRATE 50 MCG: 50 INJECTION, SOLUTION INTRAMUSCULAR; INTRAVENOUS at 15:12

## 2020-09-03 RX ADMIN — KETAMINE HYDROCHLORIDE 30 MG: 10 INJECTION INTRAMUSCULAR; INTRAVENOUS at 12:41

## 2020-09-03 RX ADMIN — ONDANSETRON 4 MG: 2 INJECTION INTRAMUSCULAR; INTRAVENOUS at 14:04

## 2020-09-03 RX ADMIN — CEFAZOLIN SODIUM 2 G: 2 INJECTION, SOLUTION INTRAVENOUS at 20:15

## 2020-09-03 RX ADMIN — FENTANYL CITRATE 50 MCG: 50 INJECTION, SOLUTION INTRAMUSCULAR; INTRAVENOUS at 12:40

## 2020-09-03 RX ADMIN — PHENYLEPHRINE HYDROCHLORIDE 200 MCG: 10 INJECTION INTRAVENOUS at 13:29

## 2020-09-03 RX ADMIN — PHENYLEPHRINE HYDROCHLORIDE 200 MCG: 10 INJECTION INTRAVENOUS at 13:21

## 2020-09-03 RX ADMIN — GABAPENTIN 300 MG: 300 CAPSULE ORAL at 10:55

## 2020-09-03 RX ADMIN — KETAMINE HYDROCHLORIDE 50 MG: 10 INJECTION INTRAMUSCULAR; INTRAVENOUS at 12:57

## 2020-09-03 RX ADMIN — PHENYLEPHRINE HYDROCHLORIDE 200 MCG: 10 INJECTION INTRAVENOUS at 13:57

## 2020-09-03 RX ADMIN — TRANEXAMIC ACID 650 MG: 650 TABLET ORAL at 10:53

## 2020-09-03 RX ADMIN — KETAMINE HYDROCHLORIDE 20 MG: 10 INJECTION INTRAMUSCULAR; INTRAVENOUS at 12:54

## 2020-09-03 RX ADMIN — PHENYLEPHRINE HYDROCHLORIDE 200 MCG: 10 INJECTION INTRAVENOUS at 13:12

## 2020-09-03 RX ADMIN — SODIUM CHLORIDE, POTASSIUM CHLORIDE, SODIUM LACTATE AND CALCIUM CHLORIDE: 600; 310; 30; 20 INJECTION, SOLUTION INTRAVENOUS at 17:45

## 2020-09-03 RX ADMIN — PHENYLEPHRINE HYDROCHLORIDE 100 MCG: 10 INJECTION INTRAVENOUS at 13:47

## 2020-09-03 RX ADMIN — PHENYLEPHRINE HYDROCHLORIDE 200 MCG: 10 INJECTION INTRAVENOUS at 13:03

## 2020-09-03 RX ADMIN — LISINOPRIL 5 MG: 5 TABLET ORAL at 20:15

## 2020-09-03 RX ADMIN — MIDAZOLAM HYDROCHLORIDE 2 MG: 1 INJECTION, SOLUTION INTRAMUSCULAR; INTRAVENOUS at 12:23

## 2020-09-03 RX ADMIN — HYDROCODONE BITARTRATE AND ACETAMINOPHEN 1 TABLET: 5; 325 TABLET ORAL at 18:39

## 2020-09-03 RX ADMIN — PROPOFOL 100 MCG/KG/MIN: 10 INJECTION, EMULSION INTRAVENOUS at 12:40

## 2020-09-03 RX ADMIN — DEXAMETHASONE SODIUM PHOSPHATE 4 MG: 4 INJECTION, SOLUTION INTRAMUSCULAR; INTRAVENOUS at 17:44

## 2020-09-03 RX ADMIN — PHENYLEPHRINE HYDROCHLORIDE 100 MCG: 10 INJECTION INTRAVENOUS at 13:42

## 2020-09-03 RX ADMIN — DEXAMETHASONE SODIUM PHOSPHATE 4 MG: 4 INJECTION, SOLUTION INTRA-ARTICULAR; INTRALESIONAL; INTRAMUSCULAR; INTRAVENOUS; SOFT TISSUE at 12:52

## 2020-09-03 RX ADMIN — ACETAMINOPHEN 975 MG: 325 TABLET, FILM COATED ORAL at 10:56

## 2020-09-03 RX ADMIN — SODIUM CHLORIDE, POTASSIUM CHLORIDE, SODIUM LACTATE AND CALCIUM CHLORIDE: 600; 310; 30; 20 INJECTION, SOLUTION INTRAVENOUS at 11:39

## 2020-09-03 RX ADMIN — Medication 3 G: at 12:19

## 2020-09-03 RX ADMIN — PHENYLEPHRINE HYDROCHLORIDE 200 MCG: 10 INJECTION INTRAVENOUS at 12:56

## 2020-09-03 RX ADMIN — PHENYLEPHRINE HYDROCHLORIDE 200 MCG: 10 INJECTION INTRAVENOUS at 13:36

## 2020-09-03 RX ADMIN — FENTANYL CITRATE 50 MCG: 50 INJECTION, SOLUTION INTRAMUSCULAR; INTRAVENOUS at 12:25

## 2020-09-03 RX ADMIN — HYDROMORPHONE HYDROCHLORIDE 0.5 MG: 1 INJECTION, SOLUTION INTRAMUSCULAR; INTRAVENOUS; SUBCUTANEOUS at 15:15

## 2020-09-03 RX ADMIN — HYDROCODONE BITARTRATE AND ACETAMINOPHEN 1 TABLET: 5; 325 TABLET ORAL at 20:15

## 2020-09-03 RX ADMIN — LIDOCAINE HYDROCHLORIDE 1 ML: 10 INJECTION, SOLUTION EPIDURAL; INFILTRATION; INTRACAUDAL; PERINEURAL at 10:57

## 2020-09-03 RX ADMIN — MAGNESIUM SULFATE 2 G: 2 INJECTION INTRAVENOUS at 11:41

## 2020-09-03 RX ADMIN — FENTANYL CITRATE 50 MCG: 50 INJECTION, SOLUTION INTRAMUSCULAR; INTRAVENOUS at 15:09

## 2020-09-03 ASSESSMENT — ACTIVITIES OF DAILY LIVING (ADL)
PRIOR_FUNCTIONAL_LEVEL_COMMENT: INDEPENDENT
BATHING: 0-->INDEPENDENT
COGNITION: 0 - NO COGNITION ISSUES REPORTED
DRESS: 0-->INDEPENDENT
SWALLOWING: 0-->SWALLOWS FOODS/LIQUIDS WITHOUT DIFFICULTY
WHICH_OF_THE_ABOVE_FUNCTIONAL_RISKS_HAD_A_RECENT_ONSET_OR_CHANGE?: AMBULATION
TOILETING: 0-->INDEPENDENT
RETIRED_EATING: 0-->INDEPENDENT
RETIRED_COMMUNICATION: 0-->UNDERSTANDS/COMMUNICATES WITHOUT DIFFICULTY
AMBULATION: 0-->INDEPENDENT
TRANSFERRING: 0-->INDEPENDENT
FALL_HISTORY_WITHIN_LAST_SIX_MONTHS: NO

## 2020-09-03 ASSESSMENT — MIFFLIN-ST. JEOR: SCORE: 2441.11

## 2020-09-03 ASSESSMENT — LIFESTYLE VARIABLES: TOBACCO_USE: 1

## 2020-09-03 NOTE — ANESTHESIA POSTPROCEDURE EVALUATION
Patient: Lupillo Smith    Procedure(s):  ARTHROPLASTY, HIP, TOTAL    Diagnosis:Arthritis [M19.90]  Diagnosis Additional Information: No value filed.    Anesthesia Type:  Spinal    Note:  Anesthesia Post Evaluation    Patient location during evaluation: Floor  Patient participation: Able to participate in evaluation but full recovery from regional anesthesia has not yet ocurrred but is anticipated to occur within 48 hours  Level of consciousness: awake and alert  Pain management: adequate  Airway patency: patent  Cardiovascular status: acceptable and hemodynamically stable  Respiratory status: acceptable, spontaneous ventilation and nasal cannula  Hydration status: acceptable  PONV: none     Anesthetic complications: None          Last vitals:  Vitals:    09/03/20 1515 09/03/20 1525 09/03/20 1540   BP: 124/76 (!) 151/90    Pulse: 61 66    Resp: 8 14    Temp:  36.6  C (97.8  F)    SpO2: 99% 92% 98%         Electronically Signed By: CRYSTAL Rossi CRNA  September 3, 2020  4:40 PM

## 2020-09-03 NOTE — ANESTHESIA PREPROCEDURE EVALUATION
Anesthesia Pre-Procedure Evaluation    Patient: Lupillo Smith   MRN: [id number] : 1965          Preoperative Diagnosis: Arthritis [M19.90]    Procedure(s):  ARTHROPLASTY, HIP, TOTAL    Past Medical History:   Diagnosis Date     Arthritis Hips, currently the right hip     Benign essential hypertension 2020     Cancer (H) Small amount of cancer detected when i had thyroid     Hx of thyroid cancer      Thyroid disease Thyroidectomy 2013     Urticaria      Past Surgical History:   Procedure Laterality Date     BIOPSY  Multiple    Pre-thyroid removal     C ANESTH,DX ARTHROSCOPIC PROC KNEE JOINT Right 1985     JOINT REPLACEMENT Left 10/2013    Left hip replacement     ROTATOR CUFF REPAIR RT/LT Right     2002- Right shoulder     THYROIDECTOMY         Anesthesia Evaluation     .             ROS/MED HX    ENT/Pulmonary:     (+)GLENN risk factors hypertension, obese, tobacco use, cigars packs/day  , . .    Neurologic:       Cardiovascular:     (+) hypertension----. : . . . :. . Previous cardiac testing date:results:date: results:ECG reviewed date:20 results:Sinus Rhythm   Low voltage in precordial leads.     ABNORMAL    date: results:          METS/Exercise Tolerance:     Hematologic:  - neg hematologic  ROS       Musculoskeletal:   (+) arthritis,  -       GI/Hepatic:  - neg GI/hepatic ROS       Renal/Genitourinary:         Endo:     (+) type II DM Last HgA1c: 5.9 date: 20 Not using insulin - not using insulin pump Normal glucose range: 's thyroid problem hypothyroidism, Obesity, .      Psychiatric:         Infectious Disease:         Malignancy:   (+) Malignancy History of Other  Other CA papillary thyroid carcinoma status post Surgery         Other: Comment: urticaria                         Physical Exam  Normal systems: cardiovascular, pulmonary and dental    Airway   Mallampati: II  TM distance: >3 FB  Neck ROM: full    Dental     Cardiovascular       Pulmonary  "            Lab Results   Component Value Date    WBC 7.6 08/25/2020    HGB 14.1 08/25/2020    HCT 43.5 08/25/2020     08/25/2020     08/25/2020    POTASSIUM 4.3 08/25/2020    CHLORIDE 106 08/25/2020    CO2 31 08/25/2020    BUN 15 08/25/2020    CR 0.83 08/25/2020     (H) 08/25/2020    ASHLEE 8.8 08/25/2020    ALBUMIN 4.0 08/29/2018    PROTTOTAL 7.8 08/29/2018    ALT 26 08/29/2018    AST 15 08/29/2018    ALKPHOS 78 08/29/2018    BILITOTAL 0.5 08/29/2018    LIPASE 175 08/29/2018    TSH 0.61 12/30/2019       Preop Vitals  BP Readings from Last 3 Encounters:   08/31/20 130/82   08/25/20 (!) 142/88   08/17/20 134/89    Pulse Readings from Last 3 Encounters:   08/31/20 84   08/25/20 83   08/17/20 79      Resp Readings from Last 3 Encounters:   08/25/20 12   08/17/20 16   07/14/20 18    SpO2 Readings from Last 3 Encounters:   08/25/20 96%   08/17/20 95%   12/30/19 98%      Temp Readings from Last 1 Encounters:   08/25/20 36.6  C (97.9  F) (Tympanic)    Ht Readings from Last 1 Encounters:   08/25/20 1.854 m (6' 1\")      Wt Readings from Last 1 Encounters:   08/25/20 (!) 156.9 kg (346 lb)    Estimated body mass index is 45.65 kg/m  as calculated from the following:    Height as of 8/25/20: 1.854 m (6' 1\").    Weight as of 8/25/20: 156.9 kg (346 lb).       Anesthesia Plan      History & Physical Review  History and physical reviewed and following examination; no interval change.    ASA Status:  3 .    NPO Status:  > 6 hours    Plan for Spinal Maintenance will be Balanced.             Postoperative Care      Consents  Anesthetic plan, risks, benefits and alternatives discussed with:  Patient..                 CRYSTAL Allan CRNA  "

## 2020-09-03 NOTE — PROCEDURES
09/03/20    PREOP DIAGNOSIS: Right hip arthritis  POSTOP DIAGNOSIS: Right hip arthritis  PROCEDURE: Right total hip arthroplasty  SURGEON: Giuliano Desouza   ASSISTANT: Wong Pina.  The presence of a PA was necessary for positioning, retraction, and safe progression of the case  ANESTHESIA: Spinal with sedation   EBL: 200cc  COMPLICATIONS: None apparent   DISPO: Stable to PACU     IMPLANTS: DePuy Corail size 12 short neck standard offset collared femur, 40mm x +5mm ceramic femoral head, 40mm x 60mm neutral polyethylene liner, and 60mm Atmore Cup with 6.5mm screws x 2 into ilium    INDICATIONS: Stanton is a 55 year old male with a history of progressive right hip pain due to end stage arthritis.  After failing nonoperative management, he elected to proceed with a right BERNARDO, understanding the risks of infection, damage to vessels or nerves, blood clots, instability, leg length discrepancy, ongoing pain and need for revision surgery.     PROCEDURE: Patient was met in the preoperative holding area where consent was reviewed and the right hip was marked.  He was then transferred to the operating theater. After a spinal anesthetic was placed, he was placed in a left lateral decubitus position with his right side up. All bony prominences were padded, he was secured with a Stulberg hip positioner, and an axillary roll was placed.  A timeout was performed verifying the correct patient, surgery, and location. He received preoperative antibiotics as well as transexamic acid. The right lower extremity was then prepped and draped in a standard fashion.     We then made an incision in line with a posterior approach to the hip. Dissection was sharply carried down through skin and subcutaneous tissue, and the gluteus fascia incised in line with the incision. After palpating and protecting the sciatic nerve, an east west retractor was placed. We then released the piriformis and short external rotators and then performed a T  Capsulotomy. The hip was dislocated and a neck cut made, approximately 7 mm proximal to the lesser trochanter. The femoral head measured 56mm and showed full thickness chondral wear with a large ring osteophyte.    We turned our attention to the acetabulum. After placing anterior and posterior retractors, foveal and labral tissue were removed.  We started with a 56mm reamer and sequentially reamed to a 59, in approximately 45 deg of abduction and 30 deg of anteversion. At that point, we had good bleeding bone circumferentially.  A trial cup was placed with good pressfit followed by our final 60mm cup, again with good press fit.  Fixation was supplemented with two 6.5mm screws into the ilium and a neutral liner was placed. We then turned our attention to the femur. After using a cookie cutter and canal finder, we broached to a size 12, keeping the stem in approximatley 10-15 deg of anteversion. At that point, we had excellent rotational stability.  We then placed a standard offset neck with and a variety of different length femoral heads.  We felt the combination of a short neck and a +5mm head most appropriate which showed leg lengths felt appropriate, the hip was stable in full extension and maximal external rotation, in the sleeping position, and with flexion to 90 degrees and internal rotation to 80 degrees.     We then removed all trial components and thoroughly irrigated the wound. We placed our final size 12 stem with a short neck.  We then retrialed with a +5mm head.  Findings as above with flexion to 90 and IR to 80.  We impacted our final +5mm ceramic head and reduced the hip. The wound was instilled with a dilute betadine solution. Capsule and short external rotators were repaired with 0-ethibond, gluteus fascia with #1 stratafix, subdermal sutures with 2-0 vicryl, and a running 3-0 subcuticular monocryl. A sterile dressing followed by an abductor pillow was placed and the patient was transferred to the  PACU in stable condition.       POSTOPERATIVE PLAN:   1. WBAT right LE with PT for mobilization  2. DVT prophylaxis: ASA 325mg daily x 42days  3. Perioperative antibiotics     Giuliano Desouza MD

## 2020-09-03 NOTE — ANESTHESIA CARE TRANSFER NOTE
Patient: Lupillo Smith    Procedure(s):  ARTHROPLASTY, HIP, TOTAL    Diagnosis: Arthritis [M19.90]  Diagnosis Additional Information: No value filed.    Anesthesia Type:   Spinal     Note:  Airway :Nasal Cannula  Patient transferred to:Phase II  Handoff Report: Identifed the Patient, Identified the Reponsible Provider, Reviewed the pertinent medical history, Discussed the surgical course, Reviewed Intra-OP anesthesia mangement and issues during anesthesia, Set expectations for post-procedure period and Allowed opportunity for questions and acknowledgement of understanding      Vitals: (Last set prior to Anesthesia Care Transfer)    CRNA VITALS  9/3/2020 1349 - 9/3/2020 1419      9/3/2020             Temp 2:  (!) 21  C (69.8  F)                Electronically Signed By: Joshua Seymour CRNA, APRN CRNA  September 3, 2020  2:19 PM

## 2020-09-04 ENCOUNTER — APPOINTMENT (OUTPATIENT)
Dept: PHYSICAL THERAPY | Facility: CLINIC | Age: 55
End: 2020-09-04
Attending: ORTHOPAEDIC SURGERY
Payer: COMMERCIAL

## 2020-09-04 VITALS
HEIGHT: 73 IN | HEART RATE: 88 BPM | OXYGEN SATURATION: 94 % | TEMPERATURE: 98.2 F | WEIGHT: 315 LBS | SYSTOLIC BLOOD PRESSURE: 175 MMHG | BODY MASS INDEX: 41.75 KG/M2 | DIASTOLIC BLOOD PRESSURE: 74 MMHG | RESPIRATION RATE: 18 BRPM

## 2020-09-04 PROBLEM — E11.9 DIABETES MELLITUS (H): Status: ACTIVE | Noted: 2020-09-04

## 2020-09-04 PROBLEM — E03.9 HYPOTHYROIDISM: Status: ACTIVE | Noted: 2018-11-06

## 2020-09-04 LAB
ANION GAP SERPL CALCULATED.3IONS-SCNC: 4 MMOL/L (ref 3–14)
BUN SERPL-MCNC: 14 MG/DL (ref 7–30)
CALCIUM SERPL-MCNC: 8.4 MG/DL (ref 8.5–10.1)
CHLORIDE SERPL-SCNC: 102 MMOL/L (ref 94–109)
CO2 SERPL-SCNC: 30 MMOL/L (ref 20–32)
CREAT SERPL-MCNC: 0.83 MG/DL (ref 0.66–1.25)
GFR SERPL CREATININE-BSD FRML MDRD: >90 ML/MIN/{1.73_M2}
GLUCOSE BLDC GLUCOMTR-MCNC: 149 MG/DL (ref 70–99)
GLUCOSE BLDC GLUCOMTR-MCNC: 183 MG/DL (ref 70–99)
GLUCOSE SERPL-MCNC: 174 MG/DL (ref 70–99)
HGB BLD-MCNC: 13.9 G/DL (ref 13.3–17.7)
POTASSIUM SERPL-SCNC: 4.4 MMOL/L (ref 3.4–5.3)
SODIUM SERPL-SCNC: 136 MMOL/L (ref 133–144)

## 2020-09-04 PROCEDURE — 85018 HEMOGLOBIN: CPT | Performed by: ORTHOPAEDIC SURGERY

## 2020-09-04 PROCEDURE — 97116 GAIT TRAINING THERAPY: CPT | Mod: GP | Performed by: PHYSICAL THERAPIST

## 2020-09-04 PROCEDURE — 99225 ZZC SUBSEQUENT OBSERVATION CARE,LEVEL II: CPT | Performed by: PHYSICIAN ASSISTANT

## 2020-09-04 PROCEDURE — 97161 PT EVAL LOW COMPLEX 20 MIN: CPT | Mod: GP | Performed by: PHYSICAL THERAPIST

## 2020-09-04 PROCEDURE — 25000132 ZZH RX MED GY IP 250 OP 250 PS 637: Performed by: PHYSICIAN ASSISTANT

## 2020-09-04 PROCEDURE — 25800030 ZZH RX IP 258 OP 636: Performed by: ORTHOPAEDIC SURGERY

## 2020-09-04 PROCEDURE — 82962 GLUCOSE BLOOD TEST: CPT

## 2020-09-04 PROCEDURE — 97110 THERAPEUTIC EXERCISES: CPT | Mod: GP | Performed by: PHYSICAL THERAPIST

## 2020-09-04 PROCEDURE — 25000132 ZZH RX MED GY IP 250 OP 250 PS 637: Performed by: ORTHOPAEDIC SURGERY

## 2020-09-04 PROCEDURE — 25000128 H RX IP 250 OP 636: Performed by: ORTHOPAEDIC SURGERY

## 2020-09-04 PROCEDURE — 80048 BASIC METABOLIC PNL TOTAL CA: CPT | Performed by: ORTHOPAEDIC SURGERY

## 2020-09-04 PROCEDURE — 36415 COLL VENOUS BLD VENIPUNCTURE: CPT | Performed by: ORTHOPAEDIC SURGERY

## 2020-09-04 RX ORDER — HYDROXYZINE HYDROCHLORIDE 25 MG/1
25-50 TABLET, FILM COATED ORAL EVERY 6 HOURS PRN
Qty: 50 TABLET | Refills: 0 | Status: SHIPPED | OUTPATIENT
Start: 2020-09-04 | End: 2021-02-22

## 2020-09-04 RX ORDER — ACETAMINOPHEN 325 MG/1
650 TABLET ORAL EVERY 4 HOURS PRN
Qty: 100 TABLET | Refills: 0
Start: 2020-09-04 | End: 2021-02-22

## 2020-09-04 RX ORDER — AMOXICILLIN 250 MG
1-2 CAPSULE ORAL DAILY PRN
Qty: 10 TABLET | Refills: 0 | Status: SHIPPED | OUTPATIENT
Start: 2020-09-04 | End: 2021-02-22

## 2020-09-04 RX ORDER — HYDROCODONE BITARTRATE AND ACETAMINOPHEN 5; 325 MG/1; MG/1
1-2 TABLET ORAL EVERY 4 HOURS PRN
Qty: 50 TABLET | Refills: 0 | Status: SHIPPED | OUTPATIENT
Start: 2020-09-04 | End: 2021-02-22

## 2020-09-04 RX ADMIN — CEFAZOLIN SODIUM 2 G: 2 INJECTION, SOLUTION INTRAVENOUS at 04:49

## 2020-09-04 RX ADMIN — LEVOTHYROXINE SODIUM 274 MCG: 50 TABLET ORAL at 08:03

## 2020-09-04 RX ADMIN — HYDROCODONE BITARTRATE AND ACETAMINOPHEN 2 TABLET: 5; 325 TABLET ORAL at 00:51

## 2020-09-04 RX ADMIN — RIVAROXABAN 10 MG: 10 TABLET, FILM COATED ORAL at 08:08

## 2020-09-04 RX ADMIN — HYDROCODONE BITARTRATE AND ACETAMINOPHEN 1 TABLET: 5; 325 TABLET ORAL at 11:35

## 2020-09-04 RX ADMIN — HYDROCODONE BITARTRATE AND ACETAMINOPHEN 1 TABLET: 5; 325 TABLET ORAL at 08:08

## 2020-09-04 RX ADMIN — SODIUM CHLORIDE, POTASSIUM CHLORIDE, SODIUM LACTATE AND CALCIUM CHLORIDE: 600; 310; 30; 20 INJECTION, SOLUTION INTRAVENOUS at 07:33

## 2020-09-04 NOTE — PROGRESS NOTES
In chart assisting RN.  Answered call light, patient reported IV pump was beeping and wondered if they would be given levothyroxine before breakfast.  Accessed MAR and explained that levothyroxine had been ordered for 0800.  BENJAMIN Jara, assisted with new IVF bag.  Jessica Zavaleta RN on 9/4/2020 at 8:23 AM

## 2020-09-04 NOTE — PLAN OF CARE
Physical Therapy Discharge Summary    Reason for therapy discharge:    Discharged to home with outpatient therapy.    Progress towards therapy goal(s). See goals on Care Plan in [facility] electronic health record for goal details.  Goals met   Pt Able to ambulate 150 feet x2 with RW. SBA,  including up/ down steps w/ use of railing. SEC  without difficulty; adhering to precautions.   HEP     Therapy recommendation(s):    Continued therapy is recommended.  Rationale/Recommendations:  OP PT to progress strengthening/ gait activities .

## 2020-09-04 NOTE — PROGRESS NOTES
"[facility] Orthopaedics Progress Note      Post-operative Day: 1 Day Post-Op    Procedure(s):  ARTHROPLASTY, HIP, TOTAL      Subjective:    Pain: minimal, has been able to skip some scheduled pain meds.    Chest pain, SOB:  No      Objective:  Blood pressure (!) 152/98, pulse 85, temperature 98.2  F (36.8  C), resp. rate 16, height 1.854 m (6' 1\"), weight (!) 154.2 kg (340 lb), SpO2 94 %.    Patient Vitals for the past 24 hrs:   BP Temp Temp src Pulse Resp SpO2 Height Weight   09/04/20 0215 (!) 152/98 -- -- -- -- -- -- --   09/04/20 0207 (!) 173/92 98.2  F (36.8  C) -- 85 16 94 % -- --   09/03/20 2150 (!) 151/90 98.4  F (36.9  C) Oral 83 15 93 % -- --   09/03/20 2110 (!) 170/108 -- -- -- -- -- -- --   09/03/20 2002 (!) 193/103 98.3  F (36.8  C) Oral 81 14 92 % -- --   09/03/20 1850 -- -- -- -- -- 94 % -- --   09/03/20 1730 (!) 153/98 -- -- 76 -- -- -- --   09/03/20 1653 136/82 -- -- 72 -- -- -- --   09/03/20 1647 (!) 131/94 -- -- 71 -- -- -- --   09/03/20 1625 133/84 -- -- 72 -- -- -- --   09/03/20 1603 125/72 -- -- 67 -- 98 % -- --   09/03/20 1540 -- -- -- -- -- 98 % -- --   09/03/20 1525 (!) 151/90 97.8  F (36.6  C) Oral 66 14 92 % -- --   09/03/20 1515 124/76 -- -- 61 8 99 % -- --   09/03/20 1500 (!) 146/92 -- -- 64 (!) 7 97 % -- --   09/03/20 1431 (!) 142/78 -- -- 76 (!) 7 99 % -- --   09/03/20 1423 -- 97.7  F (36.5  C) Oral -- -- 99 % -- --   09/03/20 1129 132/82 99  F (37.2  C) Oral 88 18 95 % 1.854 m (6' 1\") (!) 154.2 kg (340 lb)       Wt Readings from Last 4 Encounters:   09/03/20 (!) 154.2 kg (340 lb)   08/25/20 (!) 156.9 kg (346 lb)   08/17/20 (!) 156 kg (344 lb)   07/30/20 (!) 155.1 kg (342 lb)         Motor function, sensation, and circulation intact   Yes  Wound status: incisions are clean dry and intact. Yes  Calf tenderness: Bilateral  No    Pertinent Labs   Lab Results: personally reviewed.     Recent Labs   Lab Test 09/04/20  0535 08/25/20  1142 08/29/18  1745   HGB 13.9 14.1 14.5   HCT  --  43.5 " 44.6   MCV  --  89 90   PLT  --  252 302    138 138       Plan: Anticoagulation protocol: xarelto            Pain medications:  norco and vistaril            Weight bearing status:  WBAT            Disposition:  Home today             Continue cares and rehabilitation     Report completed by:  Laci Johnson PA-C  Date: 9/4/2020  Time: 7:05 AM

## 2020-09-04 NOTE — DISCHARGE SUMMARY
[facility] Discharge Summary    ABI HUMPHREY [id number]   Age: 55 year old   1965       Date of Admission:  9/3/2020  Date of Discharge::  9/4/2020  Admitting Physician:  Giuliano Desouza MD   Discharge Physician:  Laci Johnson PA-C            Admission Diagnoses:   Arthritis [M19.90]          Discharge Diagnosis:   Arthritis [M19.90]          Procedures:   Procedure(s): Total hip arthoplasty (Right)                Medications Prior to Admission:     Facility-Administered Medications Prior to Admission   Medication Dose Route Frequency Provider Last Rate Last Dose     ropivacaine (NAROPIN) injection 3 mL  3 mL   Maximiliano Bowden DO   3 mL at 07/30/20 0830     triamcinolone (KENALOG-40) injection 40 mg  40 mg   Maximiliano Bowden DO   40 mg at 07/30/20 0830     Medications Prior to Admission   Medication Sig Dispense Refill Last Dose     hydrOXYzine (ATARAX) 50 MG tablet Take 1 tablet (50 mg) by mouth every 6 hours as needed for itching 120 tablet 3 9/2/2020 at 2130     levothyroxine (SYNTHROID/LEVOTHROID) 137 MCG tablet Take 2 tablets (274 mcg) by mouth daily 180 tablet 3 9/3/2020 at 0730     lisinopril (ZESTRIL) 5 MG tablet Take 1 tablet (5 mg) by mouth daily 30 tablet 1 9/2/2020 at 2130     metFORMIN (GLUCOPHAGE-XR) 750 MG 24 hr tablet Take 2 tablets (1,500 mg) by mouth daily 180 tablet 3 9/2/2020 at [id number] time             Discharge Medications:     Current Discharge Medication List      START taking these medications    Details   acetaminophen (TYLENOL) 325 MG tablet Take 2 tablets (650 mg) by mouth every 4 hours as needed for other (mild pain)  Qty: 100 tablet, Refills: 0    Associated Diagnoses: Primary osteoarthritis of right hip      HYDROcodone-acetaminophen (NORCO) 5-325 MG tablet Take 1-2 tablets by mouth every 4 hours as needed for moderate to severe pain  Qty: 50 tablet, Refills: 0    Associated Diagnoses: Status post total replacement of right hip      !!  hydrOXYzine (ATARAX) 25 MG tablet Take 1-2 tablets (25-50 mg) by mouth every 6 hours as needed for itching or anxiety (Take with pain meds.  Helps with nausea, muscle spasms)  Qty: 50 tablet, Refills: 0    Associated Diagnoses: Primary osteoarthritis of right hip      rivaroxaban ANTICOAGULANT (XARELTO) 10 MG TABS tablet Take 1 tablet (10 mg) by mouth daily  Qty: 30 tablet, Refills: 0    Comments: For DVT Prevention  Associated Diagnoses: Primary osteoarthritis of right hip      senna-docusate (SENOKOT-S/PERICOLACE) 8.6-50 MG tablet Take 1-2 tablets by mouth daily as needed for constipation Take while on oral narcotics to prevent or treat constipation.  Qty: 10 tablet, Refills: 0    Comments: While taking narcotics  Associated Diagnoses: Primary osteoarthritis of right hip       !! - Potential duplicate medications found. Please discuss with provider.      CONTINUE these medications which have NOT CHANGED    Details   !! hydrOXYzine (ATARAX) 50 MG tablet Take 1 tablet (50 mg) by mouth every 6 hours as needed for itching  Qty: 120 tablet, Refills: 3    Associated Diagnoses: Chronic idiopathic urticaria      levothyroxine (SYNTHROID/LEVOTHROID) 137 MCG tablet Take 2 tablets (274 mcg) by mouth daily  Qty: 180 tablet, Refills: 3    Associated Diagnoses: Hypothyroidism, unspecified type      lisinopril (ZESTRIL) 5 MG tablet Take 1 tablet (5 mg) by mouth daily  Qty: 30 tablet, Refills: 1    Associated Diagnoses: Benign essential hypertension      metFORMIN (GLUCOPHAGE-XR) 750 MG 24 hr tablet Take 2 tablets (1,500 mg) by mouth daily  Qty: 180 tablet, Refills: 3    Associated Diagnoses: Prediabetes       !! - Potential duplicate medications found. Please discuss with provider.                 Hospital Course:   The patient tolerated the procedure well and was taken to postop recovery in stable condition.  Please refer to the full operative note for complete details.  Post-operative films show components in excellent  position.  Patient had adequate pain control and was prescribed physical therapy.  The patient was given 24 hrs of perioperative antibiotics.  The was followed by the hospitalist during this hospital visit to manage his medical problems.   The patient was discharged on home medications as outlined in medication reconciliation list outlined below.  The patient has instructions that if he has increased pain, fever, erythema, swelling or drainage to immediately call.          Discharge Instructions and Follow-Up:   Discharge to: Home  Activity: as tolerated. No driving until cleared by ortho  Weight bearing status: weight bearing as tolerated  Wound care: Daily dressing change. Keep clean and dry. May get incision wet in shower if no drainage is present. No soaking or scrubbing incision.  Follow up with Orthopedic Surgeon/Physician Assistant in 2 weeks. Call [phone] for appointment.  Bilateral LENKA hose for 2 weeks. May take off at night. Discuss with surgeon at first visit.           Discharge Disposition:   Discharged to home      Attestation:  I have reviewed today's vital signs, notes, medications, labs and imaging.      Laci Johnson PA-C

## 2020-09-04 NOTE — PROGRESS NOTES
"[facility] ADMISSION NOTE    Patient admitted to room 2308 at approximately 1600 via cart from surgery. Patient was accompanied by transport tech.     Verbal SBAR report received from Pacu nurse prior to patient arrival.     Patient trasferred to bed via air estevan. Patient alert and oriented X 4. The patient is not having any pain. 0-10 Pain Scale: 3. Admission vital signs: Blood pressure (!) 153/98, pulse 76, temperature 97.8  F (36.6  C), temperature source Oral, resp. rate 14, height 1.854 m (6' 1\"), weight (!) 154.2 kg (340 lb), SpO2 94 %. Patient was oriented to plan of care, call light, bed controls, tv, telephone, bathroom and visiting hours.     Risk Assessment    The following safety risks were identified during admission: fall and skin. Yellow risk band applied: YES.     Skin Initial Assessment    This writer admitted this patient and completed a full skin assessment and Phillip score in the Adult PCS flowsheet. Appropriate interventions initiated as needed.     Secondary skin check completed by Nelli BRADY.    Phillip Risk Assessment  Sensory Perception: 4-->no impairment  Moisture: 4-->rarely moist  Activity: 4-->walks frequently  Mobility: 3-->slightly limited  Nutrition: 4-->excellent  Friction and Shear: 3-->no apparent problem  Phillip Score: 22    Education    Patient has a [place] to Observation order: No  Observation education completed and documented: Linda Rodríguez RN    "

## 2020-09-04 NOTE — PROGRESS NOTES
[facility] DISCHARGE NOTE    Patient discharged to home at 12:15 PM via wheel chair. Accompanied by spouse and staff. Discharge instructions reviewed with patient and spouse, opportunity offered to ask questions. Prescriptions sent to patients preferred pharmacy. All belongings sent with patient.    Amanda Valle RN

## 2020-09-04 NOTE — DISCHARGE INSTRUCTIONS
Orthopedic Surgery Discharge Instructions    1. Follow up:  Follow up with Domonique Adkins PA-C.  in 2 weeks for post op check and x rays as scheduled.  Call [phone] if appointment needed or questions. If you have questions or concerns while at home, please call [facility]. If it is an emergency, call 1. You may find the answers to questions in the included discharge packet from the hospital or your pre operative packet you received in clinic prior to surgery.    2. Pain Medication:  Use pain medication as directed. If you are prescribed narcotic pain medications (Oxycodone, Norco, Percocet, Tylenol #3, Dilaudid, or Tramadol) then you should try to wean off of them as tolerated. These are an AS NEEDED medication, so if you are not having significant pain you should try to take fewer pills at a time or spread out the doses out over a longer period of time than is written on the prescription. You should not drive a car or operate machinery if you are taking prescribed narcotic pain medication. You may not receive a refill on this medication by your surgical team until your follow up appointment, so try to wean off your narcotics as soon as possible. If you need a refill on this medication you may call your surgical team to discuss during business hours. Refills are not given on the weekend under any circumstances, so plan accordingly.    3. How to wean narcotics: Within 2-3 days of surgery you should be able to begin weaning your pain medications. If upon leaving the hospital you are taking 2 tabs every 3-4 hours, you should decrease this to 1 tab every 3-4 hours. Around 4-5 days after surgery you should begin decreasing the frequency of your pain medication to every 4-5 hours. You may also cut your pills in half to decrease the dose as you begin the weaning process. Create a weaning schedule of your pain medication when you get home from the hospital, you may be expected to make the prescription last  until your next appointment. Call your surgical team during business hours to discuss your pain medication if you have questions or concerns about the weaning process.    4. Tylenol and pain medications: If your pain pill contains Tylenol (i.e. Percocet, Norco, Tylenol #3) and you are also taking additional Tylenol/acetaminophen as a pain medication, ensure that you are not taking too much tylenol. Prescription narcotic medications that contain Tylenol usually have 325mg of Tylenol per pill and over-the-counter medications have variable doses of Tylenol. You should not exceed 4,000mg of Tylenol in a 24-hour period. Tylenol should be used in combination with your pain medication, if able, to help reduce the amount of pain medication you are taking.    5. NSAIDs (anti inflammatory medications): You may take NSAIDs to help control your pain at home.  NSAIDs are Ibuprofen, Motrin, Advil, Aleve, Naprosyn etc. You should use over the counter medications such as NSAIDs and Tylenol to wean off narcotics. If you are also taking Aspirin for blood clot prevention, you should use caution with NSAIDs as this may cause issues such as GI bleeding, upset stomach, and dark stools. Recommended doses of NSAIDs after surgery are 1-2 tabs every 6-8 hours, not to exceed 600 mg per dose. It is best to rotate Tylenol and NSAIDs if needed every 4 hours. Use these medications in between your narcotics to help reduce the amount of pain medication needed.      6. How to take over the counter medications with pain medications:  Sample medication schedule:   8 am: Pain medication  10 am: Tylenol 500-650 mg (skip if your pain medication contains tylenol, refer to #4)  12 pm: Pain medication  2 pm: NSAIDs 1-2 tabs, not to exceeded 600 mg  4 pm: Pain medication  6 pm: Tylenol 500-650 mg (skip if your pain medication contains tylenol, refer to #4)  8 pm: Pain medication    7. Applying ice to your incision: ice can provide additional pain relief at  home. Apply  ice in 20 minute intervals. Allow your skin to warm up to room temperature, approximately 40 minutes, before applying another ice pack.    8. Incision instructions:  Keep your incision clean, covered and dry until your post op appointment.  You may shower and get the incision wet if no drainage is present.  Aquacel dressing to remain intact until follow up, this is a water proof dressing and should be left on for showering.    9. Physical therapy:  Continue physical therapy as soon as possible.  You will need to call a therapy department of your choice to arrange future appointments.  Your order for physical therapy is included in your discharge paperwork.     10. Blood Clot Prevention: Take Xarelto for 30 days for anticoagulation. If you develop abdominal pain or have signs of bleeding - blood in stool or black stools, stop taking the medication and seek medical care. Walk often at home, walking will help reduce the risk of blood clots. If you have been prescribed que stockings or compression stockings, wear them during the day until you follow up with your orthopedic team in clinic. If you were not given Que Stockings in the hospital but would like them, they can be purchased over the counter at your local pharmacy.     11. Call the office if you have any questions/concerns or are experiencing the following:  - increasing drainage from the incision  - foul-smelling or malodorous drainage from the incision  - sudden increase or change in pain that is not controlled by your prescribed medications  - inability to urinate  - new onset of weakness, numbness or severe pain in the extremities  - bowel/bladder incontinence  - Fever greater than 101.5 degrees  - Nausea/vomitting causing inability to eat food or medications    12. Total hip precautions: After your total hip replacement, you have been given hip precautions. Your surgical team will give you clearance when to return to normal activity at your  follow up appointment. Hip precautions include: no bending at the waist greater than 90 degrees or more than what is necessary to sit in a chair or on the toilet. Avoid crossing your legs while sitting or lying in bed. Sleep with a pillow between your legs at night for the first two weeks. No deep squatting or bending, and avoid heavy lifting.

## 2020-09-04 NOTE — PROGRESS NOTES
Pt cms and dressing checked every 15x4 then every half hour.  aquacel intact with no drainage.  Feet warm-right slight swelling.  Ice to incision.  Pt tolerated food and fluids.  vicodin given for discomfort.  Pt is aware that vistaril is available and decadron given per order.  Pt is due to void but denies need.  Iv infusing.  Pt was oriented to room and plan of care.  Aware that capnography needs to stay on. Reviewed admission folder and pt calls for assistance.

## 2020-09-04 NOTE — PLAN OF CARE
[facility]      OUTPATIENT PHYSICAL THERAPY EVALUATION  PLAN OF TREATMENT FOR OUTPATIENT REHABILITATION  (COMPLETE FOR INITIAL CLAIMS ONLY)  Patient's Last Name, First Name, M.I.  YOB: 1965  Lupillo Smith                           Provider's Name  [facility] Medical Record No.  [id number]                               Onset Date:  09/03/20   Start of Care Date:    9/4/20     Type:     _X_PT   ___OT   ___SLP Medical Diagnosis:                    Right total hip arthroplasty        PT Diagnosis:  Right total hip arthroplasty   Visits from SOC:  1   _________________________________________________________________________________  Plan of Treatment/Functional Goals    Planned Interventions:  ,    gait training, strengthening, home program guidelines,       Goals: See Physical Therapy Goals on Care Plan in [facility] electronic health record.    Therapy Frequency: Daily  Predicted Duration of Therapy Intervention:    _________________________________________________________________________________    I CERTIFY THE NEED FOR THESE SERVICES FURNISHED UNDER        THIS PLAN OF TREATMENT AND WHILE UNDER MY CARE     (Physician co-signature of this document indicates review and certification of the therapy plan).                 ,      Referring Physician: Deo            Initial Assessment        See Physical Therapy evaluation dated   in Epic electronic health record.

## 2020-09-04 NOTE — PROVIDER NOTIFICATION
Pt BP elevated to 193/103. Scheduled lisinopril and prn pain medication administered. Recheck /108. No orders in place for when to notify MD. MD was paged regarding matter at 2115. At this time, BP has decreased to 151/90. Nursing to continue to monitor.

## 2020-09-04 NOTE — PROGRESS NOTES
[facility] Medicine Progress Note  Date of Service: 09/04/2020    Assessment & Plan   Lupillo Smith is a 55 year old male who presented on 9/3/2020 for scheduled Procedure(s):  ARTHROPLASTY, HIP, TOTAL by Giuliano Desouza MD and is being followed by the hospital medicine service for co-management of acute and/or chronic perioperative medical problems.    S/p Procedure(s):  ARTHROPLASTY, HIP, TOTAL   1 Day Post-Op    - Pain control, wound cares, physical therapy, occupational therapy and DVT prophylaxis per orthopedic surgery service    Hypothyroidism  Chronic. Patient managed prior to admission with 274 MCG by mouth daily.   - Continue home medication.    Hyperlipidemia  Sent on patient problem list, not currently on medication.      Benign essential hypertension  Chronic.  Blood pressures reviewed, was a bit hypertensive post op.  Patient managed prior to admission with lisinopril 5 mg daily.   - Continue home medication with holding parameters.   - Continue to monitor blood pressure.     Prediabetes   Patient managed prior to admission with metformin 1500 mg daily. Hemoglobin A1c of 5.9. on 8/25/2020.    - Hold metformin while inpatient. Resume upon discharge.    - ISS.    - Moderate CHO diet.    - Hypo/hyperglyemia protocol.    - Continue follow up and management with PCP.     Asymptomatic COVID-19 evaluation   Patient was tested on 8/20/2020, for pre op eval. Negative results.       DVT Prophylaxis: as per orthopedic surgery service - Defer to primary service  Code Status: Full Code    Disposition: Anticipate discharge today following physical therapy evaluation and if pain control is well managed with oral medications.     Attestation:  I have reviewed today's vital signs, notes, medications, labs and imaging.    Alida Medrano PA-C       Interval History   Patient has done well overnight. He admits that he did not sleep well due to being a side  sleeper and having to sleep on his back last night. In addition his oxygen saturations dipped into the low 90s, setting off the alarm. He denies a history of sleep apnea.     He denies chest pain or shortness of breath. He denies abdominal pain. Has not had breakfast yet this am, but denies feeling nauseated. Pain has been well controlled. He has been able to urinate. He has been up with stand by assist.     He voices no immediate concerns at this time.     He lives with his wife at home, she is working from home at this time and is available to help upon discharge.     Physical Exam   Temp:  [97.7  F (36.5  C)-99  F (37.2  C)] 98.2  F (36.8  C)  Pulse:  [61-88] 85  Resp:  [7-18] 16  BP: (124-193)/() 152/98  SpO2:  [92 %-99 %] 94 %    Weights:   Vitals:    09/03/20 1129   Weight: (!) 154.2 kg (340 lb)    Body mass index is 44.86 kg/m .    General: Very pleasant 55 year old man. Alert, oriented. Pleasant. No acute distress. Sitting up in bed.  CV: Regular rate and rhythm.Good radial pulses bilaterally.   Respiratory: CTA bilaterally.   GI: Soft, non tender. Normal BS.   Skin: Warm and dry. Bandage intact to right hip.   Musculoskeletal: Moves all extremities approprietly.  Good strength. Trace edema of bilateral lower ext.     Data   Recent Labs   Lab 09/04/20  0535   HGB 13.9      POTASSIUM 4.4   CHLORIDE 102   CO2 30   BUN 14   CR 0.83   ANIONGAP 4   ASHLEE 8.4*   *       Recent Labs   Lab 09/04/20  0535 09/04/20  0207 09/03/20  2105 09/03/20  1448   *  --   --   --    BGM  --  183* 158* 111*        Unresulted Labs Ordered in the Past 30 Days of this Admission     No orders found for last 31 day(s).           Imaging  Recent Results (from the past 24 hour(s))   XR Pelvis w Hip Port Right 1 View    Narrative    PELVIS AND HIP PORTABLE RIGHT ONE VIEW   9/3/2020 2:45 PM     HISTORY: Post op hip arthroplasty.    Comparison 6/8/2020    FINDINGS: Pre-existing left hip arthroplasty.      Impression     IMPRESSION: Right hip arthroplasty placement.    ISMA MAZARIEGOS MD        I reviewed all new labs and imaging results over the last 24 hours.

## 2020-09-04 NOTE — PROGRESS NOTES
Discharge Planner PT   Patient plan for discharge: Home  Current status: Eval completed- One time session- see functional status below.  HEP issued  Barriers to return to prior living situation: none anticiapted  Recommendations for discharge: home, OP PT  Rationale for recommendations: POD 1 status       Entered by: Gina Fitch 09/04/2020 11:51 AM        09/04/20 0800   Quick Adds   Type of Visit Initial PT Evaluation   Living Environment   Lives With spouse   Living Arrangements house   Home Accessibility stairs to enter home;stairs within home   Number of Stairs, Main Entrance 2   Number of Stairs, Within Home, Primary other (see comments)  (15-20)   Functional Level Prior   Ambulation 0-->independent   Transferring 0-->independent   Toileting 0-->independent   Bathing 0-->independent   Communication 0-->understands/communicates without difficulty   Swallowing 0-->swallows foods/liquids without difficulty   Cognition 0 - no cognition issues reported   Fall history within last six months no   Which of the above functional risks had a recent onset or change? ambulation   Prior Functional Level Comment   PLOF- increasing pain with activity; indep. ambulation with no device/ pain.  Limping gait patter per pt   General Information   Onset of Illness/Injury or Date of Surgery - Date 09/03/20   Referring Physician Deo   Patient/Family Goals Statement return home   Pertinent History of Current Problem (include personal factors and/or comorbidities that impact the POC) Right hip arthritis; s/p Right total hip arthroplasty  History of Left BERNARDO 7 years ago   Precautions/Limitations right hip precautions   Weight-Bearing Status - RLE weight-bearing as tolerated   General Observations Pt alert- reorts pain at 2/10   Pain Assessment   Patient Currently in Pain Yes, see Vital Sign flowsheet   Range of Motion (ROM)   ROM Comment WFL adhering to precautions    MMT: Hip, Rehab Eval   Hip ADduction - Right Side (2+/5) poor  "plus, right   MMT: Knee, Rehab Eval   Knee Extension - Right Side (3/5) fair, right   Bed Mobility   Bed Mobility Comments CGA supine > sitting w/ HOB elevated   Transfer Skills   Transfer Comments Patient instructed on total hip precautions with mobility; transfer technique; sitting surfaces and avoiding combined motions. Patient performed sit to stand with rolling walker and stand by assist, cues.   Pt familiar w/ precautions from previous BERNARDO    Gait   Gait Comments Pt instructed on WBAT, gait  sequence- Able to ambulate 150 feet x2 with RW. SBA, initial cues not to pivot on RLE; including up/ down steps w/ useof railing. SEC and SBA    Balance   Balance Comments Good dynamic standing balance   Sensory Examination   Sensory Perception no deficits were identified   General Therapy Interventions   Planned Therapy Interventions gait training;strengthening;home program guidelines   Clinical Impression   Criteria for Skilled Therapeutic Intervention yes, treatment indicated   PT Diagnosis Right total hip arthroplasty   Influenced by the following impairments Decreased strength RLE, post surgical precautions   Functional limitations due to impairments ALtered mobility   Clinical Presentation Stable/Uncomplicated   Clinical Presentation Rationale clinical judgement   Clinical Decision Making (Complexity) Low complexity   Therapy Frequency Daily   Anticipated Equipment Needs at Discharge   (Pt has RW, SEC, crutches, RTS,dressing  equipment from OT )  No OT needs identified pt familiar w/ precautions, has equipment for home   Anticipated Discharge Disposition Home with Outpatient Therapy  (Already scheduled)   Risk & Benefits of therapy have been explained Yes   Patient, Family & other staff in agreement with plan of care Yes   [facility] AM-PAC  \"6 Clicks\" V.2 Basic Mobility Inpatient Short Form   1. Turning from your back to your side while in a flat bed without using bedrails? 3 - A Little   2. Moving from lying " on your back to sitting on the side of a flat bed without using bedrails? 3 - A Little   3. Moving to and from a bed to a chair (including a wheelchair)? 4 - None   4. Standing up from a chair using your arms (e.g., wheelchair, or bedside chair)? 4 - None   5. To walk in hospital room? 4 - None   6. Climbing 3-5 steps with a railing? 4 - None   Basic Mobility Raw Score (Score out of 24.Lower scores equate to lower levels of function) 22

## 2020-09-04 NOTE — PLAN OF CARE
Lupillo Smith   1965     Nursing Assessment:  Pt a/o x4, able to make needs known. Education provided to pt regarding voiding versus straight cath, pt verbalized understanding. Pt was able to SEOB and stand using walker. During this time, he was able to void a small amount using the urinal. Getting back to bed was difficult and painful for pt, he was able to sit in chair for some time following. Pt educated prn pain medication, verbalized understanding.     Pt continues to void well. Bowel sounds active in all quadrants, edu provided regarding post surgical bowels, pt verbalized understanding. Pt has been getting up and moving with standby assist, and tolerating well. IV abx administered per order and tolerated well. No acute changes noted this shift, will continue to monitor.       Vitals:  B/P: 151/90,   T: 98.4,   P: 83,   R: 15       Estefani Blackmon RN        Problem: Pain (Hip Arthroplasty)  Goal: Acceptable Pain Control  Outcome: No Change     Problem: Adult Inpatient Plan of Care  Goal: Plan of Care Review  Outcome: Improving  Goal: Patient-Specific Goal (Individualization)  Outcome: Improving  Goal: Absence of Hospital-Acquired Illness or Injury  Outcome: Improving  Goal: Optimal Comfort and Wellbeing  Outcome: Improving  Goal: Readiness for Transition of Care  Outcome: Improving  Goal: Rounds/Family Conference  Outcome: Improving     Problem: Bowel Elimination Impaired (Hip Arthroplasty)  Goal: Effective Bowel Elimination  Outcome: Improving     Problem: Infection (Hip Arthroplasty)  Goal: Absence of Infection Signs/Symptoms  Outcome: Improving     Problem: Joint Function Impaired (Hip Arthroplasty)  Goal: Optimal Functional Ability  Outcome: Improving     Problem: Ongoing Anesthesia Effects (Hip Arthroplasty)  Goal: Anesthesia/Sedation Recovery  Outcome: Improving     Problem: Postoperative Urinary Retention (Hip Arthroplasty)  Goal: Effective Urinary Elimination  Outcome: Improving     Problem:  Bleeding (Hip Arthroplasty)  Goal: Absence of Bleeding  Outcome: Adequate for Discharge     Problem: Postoperative Nausea and Vomiting (Hip Arthroplasty)  Goal: Nausea and Vomiting Relief  Outcome: Adequate for Discharge

## 2020-09-08 ENCOUNTER — HOSPITAL ENCOUNTER (OUTPATIENT)
Dept: PHYSICAL THERAPY | Facility: CLINIC | Age: 55
Setting detail: THERAPIES SERIES
End: 2020-09-08
Attending: ORTHOPAEDIC SURGERY
Payer: COMMERCIAL

## 2020-09-08 PROCEDURE — 97161 PT EVAL LOW COMPLEX 20 MIN: CPT | Mod: GP | Performed by: PHYSICAL THERAPIST

## 2020-09-08 PROCEDURE — 97110 THERAPEUTIC EXERCISES: CPT | Mod: GP | Performed by: PHYSICAL THERAPIST

## 2020-09-08 NOTE — PROGRESS NOTES
Lupillo Luis  1965 Physical Therapy Initial Evaluation  09/08/20 1300   General Information   Type of Visit Initial OP Ortho PT Evaluation   Start of Care Date 09/08/20   Referring Physician Giuliano Desouza MD   Patient/Family Goals Statement Walk without a walker   Orders Evaluate and Treat   Orders Comment Please work on gait training and abductor strengthening   Date of Order 07/20/20   Medical Diagnosis S/P Right BERNARDO   Surgical/Medical history reviewed Yes   Precautions/Limitations right hip precautions   Body Part(s)   Body Part(s) Hip   Presentation and Etiology   Pertinent history of current problem (include personal factors and/or comorbidities that impact the POC) Had surgery on 9/3/2020. Managing pain has been tricky. Has been weaning down pain medication. Taking 2-3 vicadin per day. Sees Dr. Desouza on September 17th. HEP has been going well. Has added some walking into HEP in driveway. Stairs have been going fine at home. Has been using a cane and a rail. / Comorbidities - Morbid obesity, Diabetes mellitus, HTN, History of thyroid carcinoma, History of thyroidectomy      Impairments C. Swelling;D. Decreased ROM;E. Decreased flexibility;F. Decreased strength and endurance;J. Burning;L. Tingling   Functional Limitations perform activities of daily living;perform desired leisure / sports activities;perform required work activities   Symptom Location Right hip   How/Where did it occur Other  (Surgery)   Onset date of current episode/exacerbation 09/03/20  (Date of surgery)   Chronicity Chronic   Pain rating (0-10 point scale) Best (/10);Worst (/10)   Best (/10) 2   Worst (/10) 4   Pain quality B. Dull;C. Aching   Frequency of pain/symptoms C. With activity   Pain/symptoms exacerbated by A. Sitting;B. Walking;E. Rest   Pain/symptoms eased by B. Walking;E. Changing positions;I. OTC medication(s);H. Cold   Progression of symptoms since onset: Improved   Prior Level of Function   Functional Level Prior  Comment Independent in ADLs   Current Level of Function   Patient role/employment history H. Other  (Laid off currently - Normally works at [employer])   [facility]   Home/community accessibility 2 steps with rail / 1 flight to basement with rail   Current equipment-Gait/Locomotion Front wheeled walker;Standard cane   Fall Risk Screen   Fall screen completed by PT   Have you fallen 2 or more times in the past year? No   Have you fallen and had an injury in the past year? No   Is patient a fall risk? No   Abuse Screen (yes response referral indicated)   Feels Unsafe at Home or Work/School no   Feels Threatened by Someone no   Does Anyone Try to Keep You From Having Contact with Others or Doing Things Outside Your Home? no   Physical Signs of Abuse Present no   Hip Objective Findings   Side (if bilateral, select both right and left) Right   Integumentary  Surgical site healing well with no signs of infection noted. No ecchymosis noted.   Gait/Locomotion Uses FWW with step through gait pattern / Stairs with handrail and single point cane, step to gait pattern   Right Hip Flexion PROM 90 / Left - 100   Right Hip Flexion Strength <3/5 due to ROM deficits   Right Hip Abduction Strength 4-/5 on right and 4+/5 on left   Right Hip Extension Strength 4-/5 on right and 4+/5 on left   Right Knee Flexion Strength 4/5 B   Right Knee Extension Strength 4/5 B   Planned Therapy Interventions   Planned Therapy Interventions balance training;gait training;manual therapy;neuromuscular re-education;ROM;strengthening;stretching   Planned Modality Interventions   Planned Modality Interventions Cryotherapy;Hot packs;TENS;Electrical stimulation   Clinical Impression   Criteria for Skilled Therapeutic Interventions Met yes, treatment indicated   PT Diagnosis Right BERNARDO with strength and ROM deficits and difficulty with ambulation   Influenced by the following impairments Pain, ROM and strength deficits   Functional  limitations due to impairments Difficulty with ambulation, stair climbing. performing work duties   Clinical Presentation Stable/Uncomplicated   Clinical Presentation Rationale Several comorbidities impacting PT / 1 body system / Stable   Clinical Decision Making (Complexity) Low complexity   Therapy Frequency   (1-2 times / week)   Predicted Duration of Therapy Intervention (days/wks) 6 weeks   Risk & Benefits of therapy have been explained Yes   Patient, Family & other staff in agreement with plan of care Yes   Education Assessment   Preferred Learning Style Listening;Reading;Demonstration;Pictures/video   Barriers to Learning No barriers   ORTHO GOALS   PT Ortho Eval Goals 1;2;3;4   Ortho Goal 1   Goal Identifier HEP   Goal Description Pt will be independent in HEP in order to achieve and maintain long term treatment goals.   Target Date 10/20/20   Ortho Goal 2   Goal Identifier Ambulation   Goal Description Pt will be able to ambulate without an assistive device safely for community distances   Target Date 10/20/20   Ortho Goal 3   Goal Identifier Stairs   Goal Description Pt will be able to ascend and descend 1 flight of stairs with a handrail assist and a reciprical gait pattern safely.   Target Date 10/20/20   Ortho Goal 4   Goal Identifier Work   Goal Description Pt will be able to return to work and peform all work duties with for a full shift with a minimal increase in hip pain 2-3/10.   Target Date 10/20/20   Total Evaluation Time   PT Eval, Low Complexity Minutes (07691) 17     Wesly Carr, PT, DPT

## 2020-09-10 ENCOUNTER — HOSPITAL ENCOUNTER (OUTPATIENT)
Dept: PHYSICAL THERAPY | Facility: CLINIC | Age: 55
Setting detail: THERAPIES SERIES
End: 2020-09-10
Attending: ORTHOPAEDIC SURGERY
Payer: COMMERCIAL

## 2020-09-10 PROCEDURE — 97110 THERAPEUTIC EXERCISES: CPT | Mod: GP | Performed by: PHYSICAL THERAPIST

## 2020-09-17 ENCOUNTER — HOSPITAL ENCOUNTER (OUTPATIENT)
Dept: PHYSICAL THERAPY | Facility: CLINIC | Age: 55
Setting detail: THERAPIES SERIES
End: 2020-09-17
Attending: ORTHOPAEDIC SURGERY
Payer: COMMERCIAL

## 2020-09-17 PROCEDURE — 97110 THERAPEUTIC EXERCISES: CPT | Mod: GP | Performed by: PHYSICAL THERAPIST

## 2020-09-24 ENCOUNTER — HOSPITAL ENCOUNTER (OUTPATIENT)
Dept: PHYSICAL THERAPY | Facility: CLINIC | Age: 55
Setting detail: THERAPIES SERIES
End: 2020-09-24
Attending: ORTHOPAEDIC SURGERY
Payer: COMMERCIAL

## 2020-09-24 PROCEDURE — 97110 THERAPEUTIC EXERCISES: CPT | Mod: GP | Performed by: PHYSICAL THERAPIST

## 2020-11-25 DIAGNOSIS — L50.1 CHRONIC IDIOPATHIC URTICARIA: ICD-10-CM

## 2020-11-25 RX ORDER — HYDROXYZINE HYDROCHLORIDE 50 MG/1
50 TABLET, FILM COATED ORAL EVERY 6 HOURS PRN
Qty: 30 TABLET | Refills: 0 | Status: SHIPPED | OUTPATIENT
Start: 2020-11-25 | End: 2021-02-23

## 2020-11-25 NOTE — TELEPHONE ENCOUNTER
Medication is being filled for 1 time refill only due to:  due for office visit   LOV:  12/9/2019  Loren Gomez RN

## 2020-11-25 NOTE — TELEPHONE ENCOUNTER
Requested Prescriptions   Pending Prescriptions Disp Refills     hydrOXYzine (ATARAX) 50 MG tablet 120 tablet 3     Sig: Take 1 tablet (50 mg) by mouth every 6 hours as needed for itching       There is no refill protocol information for this order        Last Written Prescription Date:    Last Fill Quantity: ,  # refills:    Last office visit: 12/9/2019 with prescribing provider:  Blaise Lynch Office Visit:

## 2020-12-12 DIAGNOSIS — R73.03 PREDIABETES: ICD-10-CM

## 2020-12-12 DIAGNOSIS — E03.9 HYPOTHYROIDISM, UNSPECIFIED TYPE: ICD-10-CM

## 2020-12-14 ENCOUNTER — HEALTH MAINTENANCE LETTER (OUTPATIENT)
Age: 55
End: 2020-12-14

## 2020-12-15 RX ORDER — LEVOTHYROXINE SODIUM 137 UG/1
274 TABLET ORAL DAILY
Qty: 180 TABLET | Refills: 0 | Status: SHIPPED | OUTPATIENT
Start: 2020-12-15 | End: 2021-02-22

## 2020-12-15 RX ORDER — METFORMIN HYDROCHLORIDE 750 MG/1
1500 TABLET, EXTENDED RELEASE ORAL DAILY
Qty: 180 TABLET | Refills: 0 | Status: SHIPPED | OUTPATIENT
Start: 2020-12-15 | End: 2021-02-22

## 2020-12-15 NOTE — TELEPHONE ENCOUNTER
Called patient and left VM, requested call back to the ENDO care team to schedule a follow up appt, he is overdue. Clinic number provided.     Abby WEBSTER MA

## 2020-12-15 NOTE — TELEPHONE ENCOUNTER
Refilled levothyroxine per G protocol.    Metformin does not pass protocol as patient is due for a follow up visit.     Endocrine MA, please call patient to schedule a follow up then forward message to Dr. Hughes.    Thanks!    Zev Medel RN....12/15/2020 9:32 AM

## 2021-02-05 ENCOUNTER — DOCUMENTATION ONLY (OUTPATIENT)
Dept: PHYSICAL THERAPY | Facility: CLINIC | Age: 56
End: 2021-02-05

## 2021-02-05 NOTE — PROGRESS NOTES
Outpatient Physical Therapy Discharge Note     Patient: Lupillo Smith  : 1965    Beginning/End Dates of Reporting Period:  2020 to 2020 (Total of 4 visits)    Referring Provider: Giuliano Desouza MD    Diagnosis: S/P Right BERNARDO     Client Self Report:  (From date of last visit)  Feeling good right now.  Doing a lot of walking.  Walking about 1.5-2.0  miles in a day. No  longer using the cane.  Going back to work next  week. Step-ups have been  going well.     Objective Measurements: (From date of last visit)  Gait - No cane needed for  ambulation. Able to  ascend and descend  stairs with a reciprical  gait pattern and a  handrail assist    Treatment Has Consisted Of:  Strengthening exercise education / Gait training    Goals:  Pt will be independent  in HEP in order to  achieve and maintain  long term treatment  Goals. GOAL MET    Pt will be able to  ambulate without an  assistive device safely  for community distances GOAL MET      Pt will be able to  ascend and descend 1  flight of stairs with a  handrail assist and a  reciprical gait pattern  safely.     GOAL MET    Pt will be able to  return to work and  peform all work duties  with for a full shift  with a minimal increase  in hip pain 2-3/10. GOAL NOT MET    Plan:  Discharge from therapy.    Discharge:    Reason for Discharge: Patient has failed to schedule further appointments.    Equipment Issued: None    Discharge Plan: Patient to continue home program.    Wesly Carr, PT, DPT

## 2021-02-22 ENCOUNTER — VIRTUAL VISIT (OUTPATIENT)
Dept: ENDOCRINOLOGY | Facility: CLINIC | Age: 56
End: 2021-02-22
Payer: COMMERCIAL

## 2021-02-22 DIAGNOSIS — Z85.850 HX OF PAPILLARY THYROID CARCINOMA: ICD-10-CM

## 2021-02-22 DIAGNOSIS — E03.9 HYPOTHYROIDISM, UNSPECIFIED TYPE: ICD-10-CM

## 2021-02-22 DIAGNOSIS — R73.03 PREDIABETES: Primary | ICD-10-CM

## 2021-02-22 PROCEDURE — 99214 OFFICE O/P EST MOD 30 MIN: CPT | Mod: TEL | Performed by: INTERNAL MEDICINE

## 2021-02-22 RX ORDER — LEVOTHYROXINE SODIUM 137 UG/1
274 TABLET ORAL DAILY
Qty: 180 TABLET | Refills: 3 | Status: SHIPPED | OUTPATIENT
Start: 2021-02-22 | End: 2022-03-24

## 2021-02-22 RX ORDER — METFORMIN HYDROCHLORIDE 750 MG/1
1500 TABLET, EXTENDED RELEASE ORAL DAILY
Qty: 180 TABLET | Refills: 3 | Status: SHIPPED | OUTPATIENT
Start: 2021-02-22 | End: 2022-03-24

## 2021-02-22 NOTE — LETTER
2/22/2021         RE: Lupillo Smith  [address]  [facility] [address]        Dear Colleague,    Thank you for referring your patient, uLpillo Smith, to the [facility] ENDOCRINOLOGY. Please see a copy of my visit note below.    Stanton is a 55 year old who is being evaluated via a billable telephone visit.      What phone number would you like to be contacted at? [phone]  How would you like to obtain your AVS? MyChart    S:  Patient presents for management of hypothyroidism.  Found to have nodules in 2000. Biopsies in 2000 and 2007 where benign.   However, as his thyroid kept growing so he went to surgery.     Surgery on 7/15/13:  DIAGNOSIS:   Thyroid, total thyroidectomy:     PROCEDURE:                         Total thyroidectomy   RECEIVED:                          In formalin   SPECIMEN INTEGRITY:                Intact   SPECIMEN SIZE:    Right lobe:                       13 x 8.5 x 7.5 cm    Left lobe:                        5.8 x 5 x 2.5 cm     SPECIMEN WEIGHT:                   337 g   TUMOR FOCALITY:                    Unifocal     DOMINANT TUMOR:   TUMOR LATERALITY:                  Left lobe   TUMOR SIZE:    Greatest dimension:               0.5 cm   HISTOLOGIC TYPE:                   Papillary carcinoma    Variant, specify:                 Follicular variant    Architecture:                     Follicular    Cytomorphology:                   Classical   HISTOLOGIC GRADE:                  G1: Well differentiated   MARGINS:                           Margins uninvolved by carcinoma    Distance of invasive carcinoma to    closest margin:                   6 mm   TUMOR CAPSULE:                     Partially encapsulated   TUMOR CAPSULAR INVASION:           Not identified   LYMPH-VASCULAR INVASION:           Not identified   EXTRATHYROIDAL EXTENSION:          Not identified     PATHOLOGIC STAGING   PRIMARY TUMOR:                     pT1: Tumor size 2 cm or  less,                                      limited to thyroid   REGIONAL LYMPH NODES:              pNX: Regional lymph nodes cannot be                                      assessed    Number examined:                  0    Number involved:                  N/A     ADDITIONAL PATHOLOGIC FINDINGS:    Adenomatous nodule(s) or Nodular                                      follicular disease (eg, nodular                                      hyperplasia, goitrous thyroid).                                      Thryoiditis.     MCSS: I    No dysphagia. No recent change to levothyroxine dose.     He carries a diagnosis of pre-diabetes and was placed on metformin by Dr Weiss.   He had begun to loose weight. Started exercising in 7/2019. Needs to loose weight prior to hip surgery.   He has been meeting with a nutritionist regularly as well. Tracking food intake on My Fitness Pal. Goal of 2770 a day or less.     Works at [employer]. 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     No issues with dysphagia.   Down from 340 to 325 pounds.   He has been limiting his sugar intake.   Right hip replaced last fall and as a results he has been able to get much more exercise.     ROS: 10 point ROS neg other than the symptoms noted above in the HPI.    Exam:   Gen: In NAD.     A/P:   PTC - Follicular variant. Outside records reviewed. pT1pNX, 7/15/13. No I 131.   TSH - at goal in 1/2019 and 12/2019.   Tg - negative with negative Ab's in 1/2019 and 12/2019.   US 1/2019: Small, normal-appearing lymph nodes are seen in the left and right neck. None have microcystic change or calcification.    -Schedule labs.   -US dependent on lab results. Every 5 years if Tg remains suppressed.     IFG - HbA1C 6.2% in 1/2019. He has lost weight since this time. Discussed continued metformin use. Discussed reports about GLP-1 and DDP-4 inhibitor with pancreatitis and pancreatic cancer. Although I cannot definitively say the patient will not get pancreatitis or pancreatic  cancer, to date there is no  conclusive evidence of a causal link with these agents for pancreatic cancer. There is a slight increase in  risk of pancreatitis but overall risk still low. Persons with DM tend to have more pancreatitis and  pancreatic cancer de henry.  5.9% in 8/2020.   -Repeat HbA1C and lipids.   -Continue metformin.   -Continue your diet and exercise program.   -Possible GLP-1 agonist in the future.       Due to the COVID 19 pandemic this visit was a telephone/video visit in order to help prevent spread of infection in this high risk patient and the general population. The patient gave verbal consent for the visit today.    I have independently reviewed and interpreted labs, imaging as indicated.     Chart review/prep time 1  1455  Chart review/prep time 2 1500  Visit Start time 1610  Visit Stop time 1625      20 minutes spent on the date of the encounter doing chart review, history and exam, documentation and further activities as noted above.  If this were a face to face visit, it would be billed as [id number].     Bobby Hughes MD on 2/23/2021 at 7:33 AM        Again, thank you for allowing me to participate in the care of your patient.        Sincerely,        Bobby Hughes MD

## 2021-02-22 NOTE — PROGRESS NOTES
Stanton is a 55 year old who is being evaluated via a billable telephone visit.      What phone number would you like to be contacted at? [phone]  How would you like to obtain your AVS? Justine    S:  Patient presents for management of hypothyroidism.  Found to have nodules in 2000. Biopsies in 2000 and 2007 where benign.   However, as his thyroid kept growing so he went to surgery.     Surgery on 7/15/13:  DIAGNOSIS:   Thyroid, total thyroidectomy:     PROCEDURE:                         Total thyroidectomy   RECEIVED:                          In formalin   SPECIMEN INTEGRITY:                Intact   SPECIMEN SIZE:    Right lobe:                       13 x 8.5 x 7.5 cm    Left lobe:                        5.8 x 5 x 2.5 cm     SPECIMEN WEIGHT:                   337 g   TUMOR FOCALITY:                    Unifocal     DOMINANT TUMOR:   TUMOR LATERALITY:                  Left lobe   TUMOR SIZE:    Greatest dimension:               0.5 cm   HISTOLOGIC TYPE:                   Papillary carcinoma    Variant, specify:                 Follicular variant    Architecture:                     Follicular    Cytomorphology:                   Classical   HISTOLOGIC GRADE:                  G1: Well differentiated   MARGINS:                           Margins uninvolved by carcinoma    Distance of invasive carcinoma to    closest margin:                   6 mm   TUMOR CAPSULE:                     Partially encapsulated   TUMOR CAPSULAR INVASION:           Not identified   LYMPH-VASCULAR INVASION:           Not identified   EXTRATHYROIDAL EXTENSION:          Not identified     PATHOLOGIC STAGING   PRIMARY TUMOR:                     pT1: Tumor size 2 cm or less,                                      limited to thyroid   REGIONAL LYMPH NODES:              pNX: Regional lymph nodes cannot be                                      assessed    Number examined:                  0    Number involved:                  N/A     ADDITIONAL  PATHOLOGIC FINDINGS:    Adenomatous nodule(s) or Nodular                                      follicular disease (eg, nodular                                      hyperplasia, goitrous thyroid).                                      Thryoiditis.     MCSS: I    No dysphagia. No recent change to levothyroxine dose.     He carries a diagnosis of pre-diabetes and was placed on metformin by Dr Weiss.   He had begun to loose weight. Started exercising in 7/2019. Needs to loose weight prior to hip surgery.   He has been meeting with a nutritionist regularly as well. Tracking food intake on My Fitness Pal. Goal of 2770 a day or less.     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     No issues with dysphagia.   Down from 340 to 325 pounds.   He has been limiting his sugar intake.   Right hip replaced last fall and as a results he has been able to get much more exercise.     ROS: 10 point ROS neg other than the symptoms noted above in the HPI.    Exam:   Gen: In NAD.     A/P:   PTC - Follicular variant. Outside records reviewed. pT1pNX, 7/15/13. No I 131.   TSH - at goal in 1/2019 and 12/2019.   Tg - negative with negative Ab's in 1/2019 and 12/2019.   US 1/2019: Small, normal-appearing lymph nodes are seen in the left and right neck. None have microcystic change or calcification.    -Schedule labs.   -US dependent on lab results. Every 5 years if Tg remains suppressed.     IFG - HbA1C 6.2% in 1/2019. He has lost weight since this time. Discussed continued metformin use. Discussed reports about GLP-1 and DDP-4 inhibitor with pancreatitis and pancreatic cancer. Although I cannot definitively say the patient will not get pancreatitis or pancreatic cancer, to date there is no  conclusive evidence of a causal link with these agents for pancreatic cancer. There is a slight increase in  risk of pancreatitis but overall risk still low. Persons with DM tend to have more pancreatitis and  pancreatic cancer de henry.  5.9% in  8/2020.   -Repeat HbA1C and lipids.   -Continue metformin.   -Continue your diet and exercise program.   -Possible GLP-1 agonist in the future.       Due to the COVID 19 pandemic this visit was a telephone/video visit in order to help prevent spread of infection in this high risk patient and the general population. The patient gave verbal consent for the visit today.    I have independently reviewed and interpreted labs, imaging as indicated.     Chart review/prep time 1  1455  Chart review/prep time 2 1500  Visit Start time 1610  Visit Stop time 1625      20 minutes spent on the date of the encounter doing chart review, history and exam, documentation and further activities as noted above.  If this were a face to face visit, it would be billed as [id number].     Bobby Hughes MD on 2/23/2021 at 7:33 AM

## 2021-02-23 ENCOUNTER — OFFICE VISIT (OUTPATIENT)
Dept: ALLERGY | Facility: CLINIC | Age: 56
End: 2021-02-23
Payer: COMMERCIAL

## 2021-02-23 VITALS
DIASTOLIC BLOOD PRESSURE: 90 MMHG | BODY MASS INDEX: 44.12 KG/M2 | HEART RATE: 75 BPM | TEMPERATURE: 98.5 F | SYSTOLIC BLOOD PRESSURE: 127 MMHG | WEIGHT: 315 LBS | OXYGEN SATURATION: 96 %

## 2021-02-23 DIAGNOSIS — L50.1 CHRONIC IDIOPATHIC URTICARIA: ICD-10-CM

## 2021-02-23 PROCEDURE — 99213 OFFICE O/P EST LOW 20 MIN: CPT | Performed by: ALLERGY & IMMUNOLOGY

## 2021-02-23 RX ORDER — HYDROXYZINE HYDROCHLORIDE 50 MG/1
50 TABLET, FILM COATED ORAL EVERY 8 HOURS PRN
Qty: 90 TABLET | Refills: 3 | Status: SHIPPED | OUTPATIENT
Start: 2021-02-23 | End: 2021-09-08

## 2021-02-23 SDOH — ECONOMIC STABILITY: FOOD INSECURITY: WITHIN THE PAST 12 MONTHS, YOU WORRIED THAT YOUR FOOD WOULD RUN OUT BEFORE YOU GOT MONEY TO BUY MORE.: NOT ASKED

## 2021-02-23 SDOH — ECONOMIC STABILITY: INCOME INSECURITY: HOW HARD IS IT FOR YOU TO PAY FOR THE VERY BASICS LIKE FOOD, HOUSING, MEDICAL CARE, AND HEATING?: NOT ASKED

## 2021-02-23 SDOH — ECONOMIC STABILITY: TRANSPORTATION INSECURITY
IN THE PAST 12 MONTHS, HAS THE LACK OF TRANSPORTATION KEPT YOU FROM MEDICAL APPOINTMENTS OR FROM GETTING MEDICATIONS?: NOT ASKED

## 2021-02-23 SDOH — ECONOMIC STABILITY: FOOD INSECURITY: WITHIN THE PAST 12 MONTHS, THE FOOD YOU BOUGHT JUST DIDN'T LAST AND YOU DIDN'T HAVE MONEY TO GET MORE.: NOT ASKED

## 2021-02-23 SDOH — ECONOMIC STABILITY: TRANSPORTATION INSECURITY
IN THE PAST 12 MONTHS, HAS LACK OF TRANSPORTATION KEPT YOU FROM MEETINGS, WORK, OR FROM GETTING THINGS NEEDED FOR DAILY LIVING?: NOT ASKED

## 2021-02-23 NOTE — LETTER
2/23/2021         RE: Lupillo Smith  [address]  [facility] [address]        Dear Colleague,    Thank you for referring your patient, Lupillo Smith, to the [facility]. Please see a copy of my visit note below.    SUBJECTIVE:                                                                   Lupillo Smith presents today to our Allergy Clinic at [facility]  for a follow up visit.  He is a 55 year old male with a history of chronic idiopathic urticaria.  Previously managed by Dr. Garth Lilly, [facility] allergist. History of hives that started when he was a teenager.     For the first several years, he was on different oral antihistamines, ultimately switched to hydroxyzine.  Depending on symptom control, he would be taking hydroxyzine from once a day to several times a day.  Until his mid-30s, he had more issues even when he was taking hydroxyzine several times a day consistently. Things got better with age.   These days, he takes hydroxyzine 50 mg by mouth once daily. Within the last year, he tried stopping it for several days and developed hives on torso/flanks. Since he restarted, he has been symptom-free.       Patient Active Problem List   Diagnosis     Prediabetes     Hypothyroidism     Morbid obesity (H)     Urticaria     Hyperlipidemia     Hx of papillary thyroid carcinoma     History of total thyroidectomy     Primary osteoarthritis of right hip     Benign essential hypertension     S/P total hip arthroplasty     Diabetes mellitus (H)       Past Medical History:   Diagnosis Date     Arthritis Hips, currently the right hip     Benign essential hypertension 9/1/2020     Cancer (H) Small amount of cancer detected when i had thyroid     Diabetes mellitus (H) 9/4/2020     Hx of thyroid cancer 2013     Thyroid disease Thyroidectomy July 2013     Urticaria       *Family history is unknown by patient.       Problem (# of Occurrences) Relation  (Name,Age of Onset)    Diabetes (1) Maternal Grandmother (Zaynab)    Hypertension (1) Father (Uvaldo)        Past Surgical History:   Procedure Laterality Date     ARTHROPLASTY HIP Right 9/3/2020    Procedure: ARTHROPLASTY, HIP, TOTAL;  Surgeon: Giuliano Desouza MD;  Location: [facility] OR     BIOPSY  Multiple    Pre-thyroid removal     C ANESTH,DX ARTHROSCOPIC PROC KNEE JOINT Right 04/1985     JOINT REPLACEMENT Left 10/2013    Left hip replacement     ROTATOR CUFF REPAIR RT/LT Right     9/2002- Right shoulder     THYROIDECTOMY  2013     Social History     Socioeconomic History     Marital status:      Spouse name: None     Number of children: None     Years of education: None     Highest education level: None   Occupational History     Employer: [employer]   Social Needs     Financial resource strain: None     Food insecurity     Worry: None     Inability: None     Transportation needs     Medical: None     Non-medical: None   Tobacco Use     Smoking status: Current Some Day Smoker     Types: Cigars     Smokeless tobacco: Never Used     Tobacco comment: One cigar every three months- very very rare   Substance and Sexual Activity     Alcohol use: Yes     Comment: rare     Drug use: No     Sexual activity: Yes     Partners: Female     Birth control/protection: None   Lifestyle     Physical activity     Days per week: None     Minutes per session: None     Stress: None   Relationships     Social connections     Talks on phone: None     Gets together: None     Attends Muslim service: None     Active member of club or organization: None     Attends meetings of clubs or organizations: None     Relationship status: None     Intimate partner violence     Fear of current or ex partner: None     Emotionally abused: None     Physically abused: None     Forced sexual activity: None   Other Topics Concern     None   Social History Narrative    February 23, 2021    ENVIRONMENTAL HISTORY: [place] home is heated with a forced air, wood stove and gas fireplace. They do have central air conditioning. The patient's bedroom is furnished with carpeting in bedroom and fabric window coverings.  Pets inside the house include 2 dogs. There is no history of cockroach or mice infestation. There are no smokers in the house.  The house does not have a damp basement.            Review of Systems   Skin: Negative for rash.           Current Outpatient Medications:      hydrOXYzine (ATARAX) 50 MG tablet, Take 1 tablet (50 mg) by mouth every 8 hours as needed for itching .  Follow-up visit needed for further refills, Disp: 90 tablet, Rfl: 3     levothyroxine (SYNTHROID/LEVOTHROID) 137 MCG tablet, Take 2 tablets (274 mcg) by mouth daily, Disp: 180 tablet, Rfl: 3     metFORMIN (GLUCOPHAGE-XR) 750 MG 24 hr tablet, Take 2 tablets (1,500 mg) by mouth daily, Disp: 180 tablet, Rfl: 3    Current Facility-Administered Medications:      ropivacaine (NAROPIN) injection 3 mL, 3 mL, , , Maximiliano Bowden DO, 3 mL at 07/30/20 0830     triamcinolone (KENALOG-40) injection 40 mg, 40 mg, , , Maximiliano Bowden DO, 40 mg at 07/30/20 0830  Immunization History   Administered Date(s) Administered     Pneumo Conj 13-V (2010&after) 08/17/2020     TDAP Vaccine (Adacel) 08/17/2020     Allergies   Allergen Reactions     Oxycodone Nausea and Vomiting     Aspirin Hives     Ibuprofen Hives     Nsaids Swelling     OBJECTIVE:                                                                 BP (!) 127/90 (BP Location: Left arm, Patient Position: Sitting, Cuff Size: Adult Large)   Pulse 75   Temp 98.5  F (36.9  C) (Tympanic)   Wt (!) 151.7 kg (334 lb 7 oz)   SpO2 96%   BMI 44.12 kg/m          Physical Exam  Vitals signs and nursing note reviewed.   Constitutional:       General: He is not in acute distress.     Appearance: He is obese. He is not diaphoretic.   HENT:      Head: Normocephalic and  atraumatic.      Right Ear: Tympanic membrane, ear canal and external ear normal.      Left Ear: Tympanic membrane, ear canal and external ear normal.      Nose: No mucosal edema or rhinorrhea.      Right Turbinates: Not enlarged, swollen or pale.      Left Turbinates: Not enlarged, swollen or pale.      Mouth/Throat:      Lips: Pink.      Mouth: Mucous membranes are moist.      Pharynx: Oropharynx is clear. No pharyngeal swelling, oropharyngeal exudate or posterior oropharyngeal erythema.   Eyes:      General:         Right eye: No discharge.         Left eye: No discharge.      Conjunctiva/sclera: Conjunctivae normal.   Cardiovascular:      Heart sounds: Normal heart sounds.   Pulmonary:      Effort: No respiratory distress.      Breath sounds: Normal breath sounds and air entry. No stridor, decreased air movement or transmitted upper airway sounds. No decreased breath sounds, wheezing, rhonchi or rales.   Musculoskeletal: Normal range of motion.   Lymphadenopathy:      Cervical: No cervical adenopathy.   Skin:     General: Skin is warm.      Capillary Refill: Capillary refill takes less than 2 seconds.      Findings: No rash.   Neurological:      Mental Status: He is alert.   Psychiatric:         Mood and Affect: Mood normal.         Behavior: Behavior normal.           ASSESSMENT/PLAN:    Chronic idiopathic urticaria  Currently well controlled with hydroxyzine 50 mg by mouth once daily.  -Continue as is.    - hydrOXYzine (ATARAX) 50 MG tablet  Dispense: 90 tablet; Refill: 3       Return in 1 year (on 2/23/2022), or if symptoms worsen or fail to improve.    Thank you for allowing us to participate in the care of this patient. Please feel free to contact us if there are any questions or concerns about the patient.    Disclaimer: This note consists of symbols derived from keyboarding, dictation and/or voice recognition software. As a result, there may be errors in the script that have gone undetected. Please  consider this when interpreting information found in this chart.    Corey Welsh MD, FAAAAI, FACJONATANI  Allergy, Asthma and Immunology    [facility]       Again, thank you for allowing me to participate in the care of your patient.        Sincerely,        Corey Welsh MD

## 2021-02-23 NOTE — PROGRESS NOTES
SUBJECTIVE:                                                                   Lupillo Smith presents today to our Allergy Clinic at [facility]  for a follow up visit.  He is a 55 year old male with a history of chronic idiopathic urticaria.  Previously managed by Dr. Richard Wyatt, [facility] allergist. History of hives that started when he was a teenager.     For the first several years, he was on different oral antihistamines, ultimately switched to hydroxyzine.  Depending on symptom control, he would be taking hydroxyzine from once a day to several times a day.  Until his mid-30s, he had more issues even when he was taking hydroxyzine several times a day consistently. Things got better with age.   These days, he takes hydroxyzine 50 mg by mouth once daily. Within the last year, he tried stopping it for several days and developed hives on torso/flanks. Since he restarted, he has been symptom-free.       Patient Active Problem List   Diagnosis     Prediabetes     Hypothyroidism     Morbid obesity (H)     Urticaria     Hyperlipidemia     Hx of papillary thyroid carcinoma     History of total thyroidectomy     Primary osteoarthritis of right hip     Benign essential hypertension     S/P total hip arthroplasty     Diabetes mellitus (H)       Past Medical History:   Diagnosis Date     Arthritis Hips, currently the right hip     Benign essential hypertension 9/1/2020     Cancer (H) Small amount of cancer detected when i had thyroid     Diabetes mellitus (H) 9/4/2020     Hx of thyroid cancer 2013     Thyroid disease Thyroidectomy July 2013     Urticaria       *Family history is unknown by patient.       Problem (# of Occurrences) Relation (Name,Age of Onset)    Diabetes (1) Maternal Grandmother (Zaynab)    Hypertension (1) Father (Uvaldo)        Past Surgical History:   Procedure Laterality Date     ARTHROPLASTY HIP Right 9/3/2020    Procedure: ARTHROPLASTY, HIP, TOTAL;  Surgeon: Deo  Giuliano Lemus MD;  Location: [facility] OR     BIOPSY  Multiple    Pre-thyroid removal     C ANESTH,DX ARTHROSCOPIC PROC KNEE JOINT Right 04/1985     JOINT REPLACEMENT Left 10/2013    Left hip replacement     ROTATOR CUFF REPAIR RT/LT Right     9/2002- Right shoulder     THYROIDECTOMY  2013     Social History     Socioeconomic History     Marital status:      Spouse name: None     Number of children: None     Years of education: None     Highest education level: None   Occupational History     Employer: [employer]   Social Needs     Financial resource strain: None     Food insecurity     Worry: None     Inability: None     Transportation needs     Medical: None     Non-medical: None   Tobacco Use     Smoking status: Current Some Day Smoker     Types: Cigars     Smokeless tobacco: Never Used     Tobacco comment: One cigar every three months- very very rare   Substance and Sexual Activity     Alcohol use: Yes     Comment: rare     Drug use: No     Sexual activity: Yes     Partners: Female     Birth control/protection: None   Lifestyle     Physical activity     Days per week: None     Minutes per session: None     Stress: None   Relationships     Social connections     Talks on phone: None     Gets together: None     Attends Jewish service: None     Active member of club or organization: None     Attends meetings of clubs or organizations: None     Relationship status: None     Intimate partner violence     Fear of current or ex partner: None     Emotionally abused: None     Physically abused: None     Forced sexual activity: None   Other Topics Concern     None   Social History Narrative    February 23, 2021    ENVIRONMENTAL HISTORY: [place] The home is heated with a forced air, wood stove and gas fireplace. They do have central air conditioning. The patient's bedroom is furnished with carpeting in bedroom and fabric window coverings.  Pets inside  the house include 2 dogs. There is no history of cockroach or mice infestation. There are no smokers in the house.  The house does not have a damp basement.            Review of Systems   Skin: Negative for rash.           Current Outpatient Medications:      hydrOXYzine (ATARAX) 50 MG tablet, Take 1 tablet (50 mg) by mouth every 8 hours as needed for itching .  Follow-up visit needed for further refills, Disp: 90 tablet, Rfl: 3     levothyroxine (SYNTHROID/LEVOTHROID) 137 MCG tablet, Take 2 tablets (274 mcg) by mouth daily, Disp: 180 tablet, Rfl: 3     metFORMIN (GLUCOPHAGE-XR) 750 MG 24 hr tablet, Take 2 tablets (1,500 mg) by mouth daily, Disp: 180 tablet, Rfl: 3    Current Facility-Administered Medications:      ropivacaine (NAROPIN) injection 3 mL, 3 mL, , , Maximiliano Bowden DO, 3 mL at 07/30/20 0830     triamcinolone (KENALOG-40) injection 40 mg, 40 mg, , , Maximiliano Bowden DO, 40 mg at 07/30/20 0830  Immunization History   Administered Date(s) Administered     Pneumo Conj 13-V (2010&after) 08/17/2020     TDAP Vaccine (Adacel) 08/17/2020     Allergies   Allergen Reactions     Oxycodone Nausea and Vomiting     Aspirin Hives     Ibuprofen Hives     Nsaids Swelling     OBJECTIVE:                                                                 BP (!) 127/90 (BP Location: Left arm, Patient Position: Sitting, Cuff Size: Adult Large)   Pulse 75   Temp 98.5  F (36.9  C) (Tympanic)   Wt (!) 151.7 kg (334 lb 7 oz)   SpO2 96%   BMI 44.12 kg/m          Physical Exam  Vitals signs and nursing note reviewed.   Constitutional:       General: He is not in acute distress.     Appearance: He is obese. He is not diaphoretic.   HENT:      Head: Normocephalic and atraumatic.      Right Ear: Tympanic membrane, ear canal and external ear normal.      Left Ear: Tympanic membrane, ear canal and external ear normal.      Nose: No mucosal edema or rhinorrhea.      Right Turbinates: Not enlarged, swollen or pale.       Left Turbinates: Not enlarged, swollen or pale.      Mouth/Throat:      Lips: Pink.      Mouth: Mucous membranes are moist.      Pharynx: Oropharynx is clear. No pharyngeal swelling, oropharyngeal exudate or posterior oropharyngeal erythema.   Eyes:      General:         Right eye: No discharge.         Left eye: No discharge.      Conjunctiva/sclera: Conjunctivae normal.   Cardiovascular:      Heart sounds: Normal heart sounds.   Pulmonary:      Effort: No respiratory distress.      Breath sounds: Normal breath sounds and air entry. No stridor, decreased air movement or transmitted upper airway sounds. No decreased breath sounds, wheezing, rhonchi or rales.   Musculoskeletal: Normal range of motion.   Lymphadenopathy:      Cervical: No cervical adenopathy.   Skin:     General: Skin is warm.      Capillary Refill: Capillary refill takes less than 2 seconds.      Findings: No rash.   Neurological:      Mental Status: He is alert.   Psychiatric:         Mood and Affect: Mood normal.         Behavior: Behavior normal.           ASSESSMENT/PLAN:    Chronic idiopathic urticaria  Currently well controlled with hydroxyzine 50 mg by mouth once daily.  -Continue as is.    - hydrOXYzine (ATARAX) 50 MG tablet  Dispense: 90 tablet; Refill: 3       Return in 1 year (on 2/23/2022), or if symptoms worsen or fail to improve.    Thank you for allowing us to participate in the care of this patient. Please feel free to contact us if there are any questions or concerns about the patient.    Disclaimer: This note consists of symbols derived from keyboarding, dictation and/or voice recognition software. As a result, there may be errors in the script that have gone undetected. Please consider this when interpreting information found in this chart.    Corey Welsh MD, FAAAAI, FACAAI  Allergy, Asthma and Immunology    [facility]

## 2021-03-02 DIAGNOSIS — Z85.850 HX OF PAPILLARY THYROID CARCINOMA: ICD-10-CM

## 2021-03-02 DIAGNOSIS — R73.03 PREDIABETES: ICD-10-CM

## 2021-03-02 LAB
CHOLEST SERPL-MCNC: 183 MG/DL
HBA1C MFR BLD: 5.9 % (ref 0–5.6)
HDLC SERPL-MCNC: 49 MG/DL
LDLC SERPL CALC-MCNC: 112 MG/DL
NONHDLC SERPL-MCNC: 134 MG/DL
T4 FREE SERPL-MCNC: 1.58 NG/DL (ref 0.76–1.46)
TRIGL SERPL-MCNC: 110 MG/DL
TSH SERPL DL<=0.005 MIU/L-ACNC: 0.04 MU/L (ref 0.4–4)

## 2021-03-02 PROCEDURE — 83036 HEMOGLOBIN GLYCOSYLATED A1C: CPT | Performed by: INTERNAL MEDICINE

## 2021-03-02 PROCEDURE — 86800 THYROGLOBULIN ANTIBODY: CPT | Performed by: INTERNAL MEDICINE

## 2021-03-02 PROCEDURE — 36415 COLL VENOUS BLD VENIPUNCTURE: CPT | Performed by: INTERNAL MEDICINE

## 2021-03-02 PROCEDURE — 80061 LIPID PANEL: CPT | Performed by: INTERNAL MEDICINE

## 2021-03-02 PROCEDURE — 84439 ASSAY OF FREE THYROXINE: CPT | Performed by: INTERNAL MEDICINE

## 2021-03-02 PROCEDURE — 84443 ASSAY THYROID STIM HORMONE: CPT | Performed by: INTERNAL MEDICINE

## 2021-03-02 PROCEDURE — 84432 ASSAY OF THYROGLOBULIN: CPT | Performed by: INTERNAL MEDICINE

## 2021-03-04 LAB — THYROGLOB AB SERPL IA-ACNC: <20 IU/ML (ref 0–40)

## 2021-03-05 LAB — THYROGLOB SERPL-MCNC: NORMAL NG/ML

## 2021-04-05 ENCOUNTER — IMMUNIZATION (OUTPATIENT)
Dept: FAMILY MEDICINE | Facility: CLINIC | Age: 56
End: 2021-04-05
Payer: COMMERCIAL

## 2021-04-05 PROCEDURE — 91301 PR COVID VAC MODERNA 100 MCG/0.5 ML IM: CPT

## 2021-04-05 PROCEDURE — 0011A PR COVID VAC MODERNA 100 MCG/0.5 ML IM: CPT

## 2021-05-03 ENCOUNTER — IMMUNIZATION (OUTPATIENT)
Dept: FAMILY MEDICINE | Facility: CLINIC | Age: 56
End: 2021-05-03
Attending: FAMILY MEDICINE
Payer: COMMERCIAL

## 2021-05-03 PROCEDURE — 91301 PR COVID VAC MODERNA 100 MCG/0.5 ML IM: CPT

## 2021-05-03 PROCEDURE — 0012A PR COVID VAC MODERNA 100 MCG/0.5 ML IM: CPT

## 2021-06-12 ENCOUNTER — HEALTH MAINTENANCE LETTER (OUTPATIENT)
Age: 56
End: 2021-06-12

## 2021-07-20 ENCOUNTER — TELEPHONE (OUTPATIENT)
Dept: FAMILY MEDICINE | Facility: CLINIC | Age: 56
End: 2021-07-20

## 2021-07-20 NOTE — TELEPHONE ENCOUNTER
Patient Quality Outreach Summary      Summary:    Patient is due/failing the following:   A1C, Microalbumin and Diabetic Follow-Up Visit and Colonoscopy    Type of outreach:    Sent letter.    Questions for provider review:    None                                                                                                                    Ladan Cano CMA       Chart routed to none.

## 2021-07-20 NOTE — LETTER
[facility]  [address]  [facility] [address]  [phone]  July 20, 2021    Lupillo Smith  [address] [place] [address]    Dear Lupillo,    We care about your health and have reviewed your health plan including your medical conditions, medication list, and lab results.  Based on this review, it is recommended that you follow up regarding the following health topic(s):     -Diabetes  -Colon Cancer Screening    We recommend you take the following action(s):  -schedule a FOLLOWUP OFFICE APPOINTMENT.  We will perform the following labs:  A1c, Lipids (fasting cholesterol - nothing to eat except water and/or meds for 8-10 hours) and Microablumin.  -schedule a COLONOSCOPY to look for colon cancer (due every 10 years or 5 years in higher risk situations.)  Colonoscopies can prevent 90-95% of colon cancer deaths.  Problem lesions can be removed before they ever become cancer.  If you do not wish to do a colonoscopy or cannot afford to do one at this time, there is another option called a Fecal Immunochemical Occult Blood Test (FIT) a take home stool sample kit.  It does not replace the colonoscopy for colorectal cancer screening, but it can detect hidden bleeding in the lower colon.  It does need to be repeated every year and if a positive result is obtained, you would be referred for a colonoscopy.  If you have completed either one of these tests at another facility, please have the records sent to our clinic for our records.    Please call us at [phone] (or use [employer]) to address the above recommendations.     Thank you for trusting [facility] and we appreciate the opportunity to serve you.  We look forward to supporting your healthcare needs in the future.    Healthy Regards,      Your Health Care Team  [facility]

## 2021-09-08 DIAGNOSIS — L50.1 CHRONIC IDIOPATHIC URTICARIA: ICD-10-CM

## 2021-09-08 RX ORDER — HYDROXYZINE HYDROCHLORIDE 50 MG/1
50 TABLET, FILM COATED ORAL EVERY 8 HOURS PRN
Qty: 90 TABLET | Refills: 3 | Status: SHIPPED | OUTPATIENT
Start: 2021-09-08 | End: 2022-11-21

## 2021-09-08 NOTE — TELEPHONE ENCOUNTER
Routing refill request to provider for review/approval because:  Drug not on the [facility] refill protocol     LOV 2/23/2021 with follow up in 1 year.     Prescription sig: Take 1 tablet (50 mg) by mouth every 8 hours as needed      Christie BARONE RN  Specialty/Allergy Clinics

## 2021-09-08 NOTE — TELEPHONE ENCOUNTER
Last Written Prescription Date:    Last Fill Quantity: ,  # refills:    Last office visit: 2/23/2021 with prescribing provider:     Future Office Visit:        Requested Prescriptions   Pending Prescriptions Disp Refills     hydrOXYzine (ATARAX) 50 MG tablet 90 tablet 3     Sig: Take 1 tablet (50 mg) by mouth every 8 hours as needed for itching .  Follow-up visit needed for further refills       There is no refill protocol information for this order

## 2021-10-02 ENCOUNTER — HEALTH MAINTENANCE LETTER (OUTPATIENT)
Age: 56
End: 2021-10-02

## 2021-11-09 ENCOUNTER — DOCUMENTATION ONLY (OUTPATIENT)
Dept: FAMILY MEDICINE | Facility: CLINIC | Age: 56
End: 2021-11-09
Payer: COMMERCIAL

## 2021-11-09 NOTE — PROGRESS NOTES
Patient Quality Outreach Summary      Summary:    Patient is due/failing the following:   Diabetic Follow-Up Visit    Type of outreach:    Sent [facility] [employer] message.    Questions for provider review:    None                                                                                                                    CRYSTAL [facility]

## 2021-12-28 ENCOUNTER — E-VISIT (OUTPATIENT)
Dept: URGENT CARE | Facility: URGENT CARE | Age: 56
End: 2021-12-28
Payer: COMMERCIAL

## 2021-12-28 DIAGNOSIS — Z20.822 SUSPECTED COVID-19 VIRUS INFECTION: Primary | ICD-10-CM

## 2021-12-28 PROCEDURE — 99421 OL DIG E/M SVC 5-10 MIN: CPT | Performed by: FAMILY MEDICINE

## 2021-12-29 NOTE — PATIENT INSTRUCTIONS
[place],      Based on your responses, you may have coronavirus (COVID-19). This illness can cause fever, cough and trouble breathing. Many people get a mild case and get better on their own. Some people can get very sick.    Will I be tested for COVID-19?  We would like to test you for COVID-19 virus. I have placed orders for this test.     To schedule: go to your [employer] home page and scroll down to the section that says  You have an appointment that needs to be scheduled  and click the large green button that says  Schedule Now  and follow the steps to find the next available openings.    If you are unable to complete these [employer] scheduling steps, please call [phone] to schedule your testing.     Return to work/school/ guidance:  Please let your workplace manager and staffing office know when your isolation ends.       If you receive a positive COVID-19 test result, follow the guidance of the those who are giving you the results. Usually the return to work is 10 days from symptom onset or positive test date, (or in some cases 20 days if you are immunocompromised). If your symptoms started after your positive test, the 10 days should start when your symptoms started.   o If you work at [employer], you must also be cleared by Employee Occupational Health and Safety to return to work.      If you receive a negative COVID-19 test result and did not have a high risk exposure to someone with a known positive COVID-19 test, you can return to work once you're free of fever for 24 hours without fever-reducing medication and your symptoms are improving or resolved.    If you receive a negative COVID-19 test and had a high-risk exposure to someone who has tested positive for COVID-19 then you can return to work 14 days after your last contact with the positive individual. Follow quarantine guidance given by your doctor or public health officials.     Sign up for Get[employer] Loop:  We know it's scary to hear  that you might have COVID-19. We want to track your symptoms to make sure you're okay over the next 2 weeks. Please look for an email from [employer]--this is a free, online program that we'll use to keep in touch. To sign up, follow the link in the email you will receive. Learn more at [email or web address]    How can I take care of myself?  Over the counter medications may help with your symptoms like congestion, cough, chills, or fever.    There are not many effective prescription treatments for early COVID-19. Hydroxychloroquine, ivermectin, and azithromycin are not effective or recommended for COVID-19.    If your symptoms started in the last 10 days, you may be able to receive a treatment with monoclonal antibodies. This treatment can lower your risk of severe illness and going to the hospital. It is given through an IV or under your skin (subcutaneous) and must be given at an infusion center. You must be 12 or older, weight at least 88 pounds, and have a positive COVID-19 test.     If you would like to sign up to be considered to receive the monoclonal antibody medicine, please complete a participation form through the [facility] here: MNRAP ([email or web address]). You may also call the [facility] COVID-19 Public Hotline at 1-726.281.3021 (open Mon-Fri: 9am-7pm and Sat: 10am-6pm).     Not all people who are eligible will receive the medicine, since supply is limited. You will be contacted in the next 1 to 2 business days only if you are selected. If you do not receive a call, you have not been selected to receive the medicine. If you have any questions about this medication, please contact your primary care provider. For more information, see [email or web address]/[email or web address]      Get lots of rest. Drink extra fluids (unless a doctor has told you not to)    Take Tylenol (acetaminophen) or ibuprofen for fever or  pain. If you have liver or kidney problems, ask your family doctor if it's okay to take Tylenol o ibuprofen    Take over the counter medications for your symptoms, as directed by your doctor. You may also talk to your pharmacist.      If you have other health problems (like cancer, heart failure, an organ transplant or severe kidney disease): Call your specialty clinic if you don't feel better in the next 2 days.    Know when to call 911. Emergency warning signs include:  o Trouble breathing or shortness of breath  o Pain or pressure in the chest that doesn't go away  o Feeling confused like you haven't felt before, or not being able to wake up  o Bluish-colored lips or face    Where can I get more information?    [facility] - About COVID-19: [email or web address]     CDC - What to Do If You're Sick:     www.cdc.gov/coronavirus/2019-ncov/about/steps-when-sick.html    CDC - Ending Home Isolation:  https://www.cdc.gov/coronavirus/2019-ncov/your-health/quarantine-isolation.html    CDC - Caring for Someone:  www.cdc.gov/coronavirus/2019-ncov/if-you-are-sick/care-for-someone.html    [facility] clinical trials (COVID-19 research studies): clinicalaffairs.[facility]-clinical-trials    Below are the COVID-19 hotlines at the [place] Department of Health ([facility]). Interpreters are available.  o For health questions: Call 209-366-0030 or 1-681.969.8195 (7 a.m. to 7 p.m.)  o For questions about schools and childcare: Call 182-646-7769 or 1-326.716.5417 (7 a.m. to 7 p.m.)  December 28, 2021  RE:  Lupillo Smith                                                                                                                  [address] [place] [address]      To whom it may concern:    I evaluated Lupillo Smith on December 28, 2021. Lupillo Smith should be excused from work/school.     They should let their workplace manager and staffing office know when their quarantine  ends.    We can not give an exact date as it depends on the information below. They can calculate this on their own or work with their manager/staffing office to calculate this. (For example if they were exposed on 10/04, they would have to quarantine for 14 full days. That would be through 10/18. They could return on 10/19.)    Quarantine Guidelines:    If patient receives a positive COVID-19 test result, they should follow the guidance of those who are giving the results. Usually the return to work is 10 (or in some cases 20 days from symptom onset.) If they work at [employer], they must be cleared by Employee Occupational Health and Safety to return to work.      If patient receives a negative COVID-19 test result and did not have a high risk exposure to someone with a known positive COVID-19 test, they can return to work once they're free of fever for 24 hours without fever-reducing medication and their symptoms are improving or resolved.    If patient receives a negative COVID-19 test and if they had a high risk exposure to someone who has tested positive for COVID-19 then they can return to work 14 days after their last contact with the positive individual    Note: If there is ongoing exposure to the covid positive person, this quarantine period may be longer than 14 days. (For example, if they are continually exposed to their child, who tested positive and cannot isolate from them, then the quarantine of 7-14 days can't start until their child is no longer contagious. This is typically 10 days from onset to the child's symptoms. So the total duration may be 17-24 days in this case.)     Sincerely,  Bob Contreras DO          o

## 2022-01-22 ENCOUNTER — HEALTH MAINTENANCE LETTER (OUTPATIENT)
Age: 57
End: 2022-01-22

## 2022-02-26 DIAGNOSIS — E03.9 HYPOTHYROIDISM, UNSPECIFIED TYPE: ICD-10-CM

## 2022-02-26 DIAGNOSIS — R73.03 PREDIABETES: ICD-10-CM

## 2022-02-28 RX ORDER — METFORMIN HYDROCHLORIDE 750 MG/1
1500 TABLET, EXTENDED RELEASE ORAL DAILY
Qty: 180 TABLET | Refills: 0 | OUTPATIENT
Start: 2022-02-28

## 2022-02-28 RX ORDER — LEVOTHYROXINE SODIUM 137 UG/1
274 TABLET ORAL DAILY
Qty: 180 TABLET | Refills: 0 | OUTPATIENT
Start: 2022-02-28

## 2022-03-10 ENCOUNTER — OFFICE VISIT (OUTPATIENT)
Dept: ENDOCRINOLOGY | Facility: CLINIC | Age: 57
End: 2022-03-10
Payer: COMMERCIAL

## 2022-03-10 VITALS
DIASTOLIC BLOOD PRESSURE: 94 MMHG | SYSTOLIC BLOOD PRESSURE: 144 MMHG | TEMPERATURE: 98.1 F | WEIGHT: 315 LBS | HEIGHT: 73 IN | HEART RATE: 80 BPM | BODY MASS INDEX: 41.75 KG/M2

## 2022-03-10 DIAGNOSIS — E03.9 HYPOTHYROIDISM, UNSPECIFIED TYPE: ICD-10-CM

## 2022-03-10 DIAGNOSIS — R03.0 ELEVATED BLOOD PRESSURE READING WITHOUT DIAGNOSIS OF HYPERTENSION: Primary | ICD-10-CM

## 2022-03-10 DIAGNOSIS — Z85.850 HX OF PAPILLARY THYROID CARCINOMA: ICD-10-CM

## 2022-03-10 DIAGNOSIS — R73.03 PREDIABETES: ICD-10-CM

## 2022-03-10 LAB
ANION GAP SERPL CALCULATED.3IONS-SCNC: 1 MMOL/L (ref 3–14)
BUN SERPL-MCNC: 17 MG/DL (ref 7–30)
CALCIUM SERPL-MCNC: 9.2 MG/DL (ref 8.5–10.1)
CHLORIDE BLD-SCNC: 106 MMOL/L (ref 94–109)
CHOLEST SERPL-MCNC: 198 MG/DL
CO2 SERPL-SCNC: 32 MMOL/L (ref 20–32)
CREAT SERPL-MCNC: 0.79 MG/DL (ref 0.66–1.25)
FASTING STATUS PATIENT QL REPORTED: YES
GFR SERPL CREATININE-BSD FRML MDRD: >90 ML/MIN/1.73M2
GLUCOSE BLD-MCNC: 107 MG/DL (ref 70–99)
HBA1C MFR BLD: 5.8 % (ref 0–5.6)
HDLC SERPL-MCNC: 48 MG/DL
LDLC SERPL CALC-MCNC: 128 MG/DL
NONHDLC SERPL-MCNC: 150 MG/DL
POTASSIUM BLD-SCNC: 4.2 MMOL/L (ref 3.4–5.3)
SODIUM SERPL-SCNC: 139 MMOL/L (ref 133–144)
T4 FREE SERPL-MCNC: 1.25 NG/DL (ref 0.76–1.46)
THYROGLOB AB SERPL IA-ACNC: <20 IU/ML
THYROGLOB SERPL-MCNC: <0.2 NG/ML
TRIGL SERPL-MCNC: 111 MG/DL
TSH SERPL DL<=0.005 MIU/L-ACNC: 0.16 MU/L (ref 0.4–4)

## 2022-03-10 PROCEDURE — 84439 ASSAY OF FREE THYROXINE: CPT | Performed by: INTERNAL MEDICINE

## 2022-03-10 PROCEDURE — 99214 OFFICE O/P EST MOD 30 MIN: CPT | Performed by: INTERNAL MEDICINE

## 2022-03-10 PROCEDURE — 84432 ASSAY OF THYROGLOBULIN: CPT | Performed by: INTERNAL MEDICINE

## 2022-03-10 PROCEDURE — 36415 COLL VENOUS BLD VENIPUNCTURE: CPT | Performed by: INTERNAL MEDICINE

## 2022-03-10 PROCEDURE — 84432 ASSAY OF THYROGLOBULIN: CPT | Mod: 59 | Performed by: INTERNAL MEDICINE

## 2022-03-10 PROCEDURE — 84443 ASSAY THYROID STIM HORMONE: CPT | Performed by: INTERNAL MEDICINE

## 2022-03-10 PROCEDURE — 80048 BASIC METABOLIC PNL TOTAL CA: CPT | Performed by: INTERNAL MEDICINE

## 2022-03-10 PROCEDURE — 83036 HEMOGLOBIN GLYCOSYLATED A1C: CPT | Performed by: INTERNAL MEDICINE

## 2022-03-10 PROCEDURE — 86800 THYROGLOBULIN ANTIBODY: CPT | Performed by: INTERNAL MEDICINE

## 2022-03-10 PROCEDURE — 80061 LIPID PANEL: CPT | Performed by: INTERNAL MEDICINE

## 2022-03-10 RX ORDER — NEBULIZER AND COMPRESSOR
1 EACH MISCELLANEOUS EVERY OTHER DAY
Qty: 1 KIT | Refills: 0 | Status: SHIPPED | OUTPATIENT
Start: 2022-03-10 | End: 2023-06-30

## 2022-03-10 NOTE — NURSING NOTE
"Initial BP (!) 144/94 (BP Location: Left arm, Patient Position: Sitting, Cuff Size: Adult Large)   Pulse 80   Temp 98.1  F (36.7  C) (Tympanic)   Ht 1.854 m (6' 1\")   Wt (!) 151.8 kg (334 lb 9.6 oz)   BMI 44.15 kg/m   Estimated body mass index is 44.15 kg/m  as calculated from the following:    Height as of this encounter: 1.854 m (6' 1\").    Weight as of this encounter: 151.8 kg (334 lb 9.6 oz). .    Mary Carmen Traore MA    "

## 2022-03-10 NOTE — LETTER
3/10/2022         RE: Lupillo Smith  [address] [address] [address]        Dear Colleague,    Thank you for referring your patient, Lupillo Smith, to the [facility]. Please see a copy of my visit note below.    S:  Patient presents for management of hypothyroidism.  Found to have nodules in 2000. Biopsies in 2000 and 2007 where benign.   However, as his thyroid kept growing so he went to surgery.     Surgery on 7/15/13:  DIAGNOSIS:   Thyroid, total thyroidectomy:     PROCEDURE:                         Total thyroidectomy   RECEIVED:                          In formalin   SPECIMEN INTEGRITY:                Intact   SPECIMEN SIZE:    Right lobe:                       13 x 8.5 x 7.5 cm    Left lobe:                        5.8 x 5 x 2.5 cm     SPECIMEN WEIGHT:                   337 g   TUMOR FOCALITY:                    Unifocal     DOMINANT TUMOR:   TUMOR LATERALITY:                  Left lobe   TUMOR SIZE:    Greatest dimension:               0.5 cm   HISTOLOGIC TYPE:                   Papillary carcinoma    Variant, specify:                 Follicular variant    Architecture:                     Follicular    Cytomorphology:                   Classical   HISTOLOGIC GRADE:                  G1: Well differentiated   MARGINS:                           Margins uninvolved by carcinoma    Distance of invasive carcinoma to    closest margin:                   6 mm   TUMOR CAPSULE:                     Partially encapsulated   TUMOR CAPSULAR INVASION:           Not identified   LYMPH-VASCULAR INVASION:           Not identified   EXTRATHYROIDAL EXTENSION:          Not identified     PATHOLOGIC STAGING   PRIMARY TUMOR:                     pT1: Tumor size 2 cm or less,                                      limited to thyroid   REGIONAL LYMPH NODES:              pNX: Regional lymph nodes cannot be                                      assessed    Number  "examined:                  0    Number involved:                  N/A     ADDITIONAL PATHOLOGIC FINDINGS:    Adenomatous nodule(s) or Nodular                                      follicular disease (eg, nodular                                      hyperplasia, goitrous thyroid).                                      Thryoiditis.     MCSS: I    No dysphagia. No recent change to levothyroxine dose.     He carries a diagnosis of pre-diabetes and was placed on metformin by Dr Weiss.   He had begun to loose weight. Started exercising in 7/2019. Needs to loose weight prior to hip surgery.   He has been meeting with a nutritionist regularly as well. Tracking food intake on My Fitness Pal. Goal of 2770 a day or less.     Works at [employer]. 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     No issues with dysphagia.   Down from 340 to 325 pounds.   He has been limiting his sugar intake.   Right hip replaced last fall and as a results he has been able to get much more exercise.     In 3/2022 he has been lifting weights and doing cardio 3/week.   Admits his diet is \"not great\".     ROS: 10 point ROS neg other than the symptoms noted above in the HPI.    Exam:   Vital signs:  Temp: 98.1  F (36.7  C) Temp src: Tympanic BP: (!) 144/94 Pulse: 80           Height: 185.4 cm (6' 1\") Weight: (!) 151.8 kg (334 lb 9.6 oz)  Estimated body mass index is 44.15 kg/m  as calculated from the following:    Height as of this encounter: 1.854 m (6' 1\").    Weight as of this encounter: 151.8 kg (334 lb 9.6 oz).  Gen: In NAD.   HEENT: no proptosis or lid lag, EOMI, no palpable thyroid tissue. No LAD  Card: S1 S2 RRR no m/r/g.  Pulm: CTA b/l.   Ext: +2 LE edema.   Neuro: no tremor, +2 DTR's.      A/P:   PTC - Follicular variant. Outside records reviewed. pT1pNX, 7/15/13. No I 131.   TSH - at goal in 1/2019 and 12/2019.    Slightly hyperthyroid in 3/2021 and levothyroxine dose lowered.    Repeat labs now.   Tg - negative with negative Ab's in 1/2019 and 12/2019. "    Negative with negative Ab's in 3/2021.    Repeat now.   US 1/2019: Small, normal-appearing lymph nodes are seen in the left and right neck. None have microcystic change or calcification.   Repeat in 2024 unless Tg/TgAb level increases.   -Recommend future care with Dr Hodgson or Dr Fried in [place].     IFG - HbA1C 6.2% in 1/2019. He has lost weight since this time. Discussed continued metformin use. Discussed reports about GLP-1 and DDP-4 inhibitor with pancreatitis and pancreatic cancer. Although I cannot definitively say the patient will not get pancreatitis or pancreatic cancer, to date there is no  conclusive evidence of a causal link with these agents for pancreatic cancer. There is a slight increase in  risk of pancreatitis but overall risk still low. Persons with DM tend to have more pancreatitis and  pancreatic cancer de henry.  5.9% in 8/2020.   5.9% in 3/2022.   -Labs today.   -Continue metformin.   -Continue your exercise program.   -Work on reducing portion size with dinner.   -Possible GLP-1 agonist in the future.     Elevated blood pressure - no prior readings for the last year. Those were elevated as well.  -Recommend checking blood pressure at home 3/week.   -Establish with a new primary care.   -Bring readings to primary care visit.     Bobby Hughes MD on 3/10/2022 at 9:29 AM    Answers for HPI/ROS submitted by the patient on 3/10/2022  General Symptoms: No  Skin Symptoms: No  HENT Symptoms: No  EYE SYMPTOMS: No  HEART SYMPTOMS: No  LUNG SYMPTOMS: No  INTESTINAL SYMPTOMS: No  URINARY SYMPTOMS: No  REPRODUCTIVE SYMPTOMS: No  SKELETAL SYMPTOMS: No  BLOOD SYMPTOMS: No  NERVOUS SYSTEM SYMPTOMS: No  MENTAL HEALTH SYMPTOMS: No          Again, thank you for allowing me to participate in the care of your patient.        Sincerely,        Bobby Hughes MD

## 2022-03-10 NOTE — PATIENT INSTRUCTIONS
-Recommend checking blood pressure at home 3/week.   -Establish with a new primary care.   -Bring readings to primary care visit.     -Labs today.     -Continue metformin.   -Continue your exercise program.   -Work on reducing portion size with dinner.     -Recommend future care with Dr Hodgson or Dr Fried in [place]. Ask about video visits.

## 2022-03-10 NOTE — PROGRESS NOTES
S:  Patient presents for management of hypothyroidism.  Found to have nodules in 2000. Biopsies in 2000 and 2007 where benign.   However, as his thyroid kept growing so he went to surgery.     Surgery on 7/15/13:  DIAGNOSIS:   Thyroid, total thyroidectomy:     PROCEDURE:                         Total thyroidectomy   RECEIVED:                          In formalin   SPECIMEN INTEGRITY:                Intact   SPECIMEN SIZE:    Right lobe:                       13 x 8.5 x 7.5 cm    Left lobe:                        5.8 x 5 x 2.5 cm     SPECIMEN WEIGHT:                   337 g   TUMOR FOCALITY:                    Unifocal     DOMINANT TUMOR:   TUMOR LATERALITY:                  Left lobe   TUMOR SIZE:    Greatest dimension:               0.5 cm   HISTOLOGIC TYPE:                   Papillary carcinoma    Variant, specify:                 Follicular variant    Architecture:                     Follicular    Cytomorphology:                   Classical   HISTOLOGIC GRADE:                  G1: Well differentiated   MARGINS:                           Margins uninvolved by carcinoma    Distance of invasive carcinoma to    closest margin:                   6 mm   TUMOR CAPSULE:                     Partially encapsulated   TUMOR CAPSULAR INVASION:           Not identified   LYMPH-VASCULAR INVASION:           Not identified   EXTRATHYROIDAL EXTENSION:          Not identified     PATHOLOGIC STAGING   PRIMARY TUMOR:                     pT1: Tumor size 2 cm or less,                                      limited to thyroid   REGIONAL LYMPH NODES:              pNX: Regional lymph nodes cannot be                                      assessed    Number examined:                  0    Number involved:                  N/A     ADDITIONAL PATHOLOGIC FINDINGS:    Adenomatous nodule(s) or Nodular                                      follicular disease (eg, nodular                                      hyperplasia, goitrous  "thyroid).                                      Thryoiditis.     MCSS: I    No dysphagia. No recent change to levothyroxine dose.     He carries a diagnosis of pre-diabetes and was placed on metformin by Dr Weiss.   He had begun to loose weight. Started exercising in 7/2019. Needs to loose weight prior to hip surgery.   He has been meeting with a nutritionist regularly as well. Tracking food intake on My Fitness Pal. Goal of 2770 a day or less.     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     No issues with dysphagia.   Down from 340 to 325 pounds.   He has been limiting his sugar intake.   Right hip replaced last fall and as a results he has been able to get much more exercise.     In 3/2022 he has been lifting weights and doing cardio 3/week.   Admits his diet is \"not great\".     ROS: 10 point ROS neg other than the symptoms noted above in the HPI.    Exam:   Vital signs:  Temp: 98.1  F (36.7  C) Temp src: Tympanic BP: (!) 144/94 Pulse: 80           Height: 185.4 cm (6' 1\") Weight: (!) 151.8 kg (334 lb 9.6 oz)  Estimated body mass index is 44.15 kg/m  as calculated from the following:    Height as of this encounter: 1.854 m (6' 1\").    Weight as of this encounter: 151.8 kg (334 lb 9.6 oz).  Gen: In NAD.   HEENT: no proptosis or lid lag, EOMI, no palpable thyroid tissue. No LAD  Card: S1 S2 RRR no m/r/g.  Pulm: CTA b/l.   Ext: +2 LE edema.   Neuro: no tremor, +2 DTR's.      A/P:   PTC - Follicular variant. Outside records reviewed. pT1pNX, 7/15/13. No I 131.   TSH - at goal in 1/2019 and 12/2019.    Slightly hyperthyroid in 3/2021 and levothyroxine dose lowered.    Repeat labs now.   Tg - negative with negative Ab's in 1/2019 and 12/2019.    Negative with negative Ab's in 3/2021.    Repeat now.   US 1/2019: Small, normal-appearing lymph nodes are seen in the left and right neck. None have microcystic change or calcification.   Repeat in 2024 unless Tg/TgAb level increases.   -Recommend future care with Dr" Gokul or Dr Fried in [place].     IFG - HbA1C 6.2% in 1/2019. He has lost weight since this time. Discussed continued metformin use. Discussed reports about GLP-1 and DDP-4 inhibitor with pancreatitis and pancreatic cancer. Although I cannot definitively say the patient will not get pancreatitis or pancreatic cancer, to date there is no  conclusive evidence of a causal link with these agents for pancreatic cancer. There is a slight increase in  risk of pancreatitis but overall risk still low. Persons with DM tend to have more pancreatitis and  pancreatic cancer de henry.  5.9% in 8/2020.   5.9% in 3/2022.   -Labs today.   -Continue metformin.   -Continue your exercise program.   -Work on reducing portion size with dinner.   -Possible GLP-1 agonist in the future.     Elevated blood pressure - no prior readings for the last year. Those were elevated as well.  -Recommend checking blood pressure at home 3/week.   -Establish with a new primary care.   -Bring readings to primary care visit.     Bobby Hughes MD on 3/10/2022 at 9:29 AM    Answers for HPI/ROS submitted by the patient on 3/10/2022  General Symptoms: No  Skin Symptoms: No  HENT Symptoms: No  EYE SYMPTOMS: No  HEART SYMPTOMS: No  LUNG SYMPTOMS: No  INTESTINAL SYMPTOMS: No  URINARY SYMPTOMS: No  REPRODUCTIVE SYMPTOMS: No  SKELETAL SYMPTOMS: No  BLOOD SYMPTOMS: No  NERVOUS SYSTEM SYMPTOMS: No  MENTAL HEALTH SYMPTOMS: No

## 2022-03-21 DIAGNOSIS — E03.9 HYPOTHYROIDISM, UNSPECIFIED TYPE: ICD-10-CM

## 2022-03-21 DIAGNOSIS — R73.03 PREDIABETES: ICD-10-CM

## 2022-03-21 NOTE — TELEPHONE ENCOUNTER
[facility] Call Center    Phone Message    May a detailed message be left on voicemail: yes     Reason for Call: Medication Refill Request    Has the patient contacted the pharmacy for the refill? Yes   Name of medication being requested: levothyroxine (SYNTHROID/LEVOTHROID) 137 MCG tablet & metFORMIN (GLUCOPHAGE-XR) 750 MG 24 hr tablet    Provider who prescribed the medication: Saul    Pharmacy: [id number] - [place], [place] - [address] [place] [address]    Date medication is needed: whenever possible-     Pt stated Dr. Hughes sent a Blood pressure cuff script to pharmacy as well, but they cannot bill insurance.  Pt is wondering if it can be filled through  pharmacy under insurance.  Please call pt with next steps.      Action Taken: Other: endo    Travel Screening: Not Applicable

## 2022-03-24 RX ORDER — METFORMIN HYDROCHLORIDE 750 MG/1
1500 TABLET, EXTENDED RELEASE ORAL DAILY
Qty: 180 TABLET | Refills: 0 | Status: SHIPPED | OUTPATIENT
Start: 2022-03-24 | End: 2022-06-01

## 2022-03-24 RX ORDER — LEVOTHYROXINE SODIUM 137 UG/1
274 TABLET ORAL DAILY
Qty: 180 TABLET | Refills: 0 | Status: SHIPPED | OUTPATIENT
Start: 2022-03-24 | End: 2022-06-01

## 2022-03-24 NOTE — TELEPHONE ENCOUNTER
Levothyroxine Sodium Oral Tablet 137 MCG      Last Written Prescription Date:  2-  Last Fill Quantity: 180,   # refills: 3    Routing refill request to provider for review/approval because:  Abnormal TSH        metFORMIN HCl ER Oral Tablet Extended Release 24 Hour 750 MG    Last Written Prescription Date:  2-  Last Fill Quantity: 180,   # refills: 3      Last Office Visit : 3-  Future Office visit:  None      Kathleen M Doege RN

## 2022-04-05 ENCOUNTER — HOSPITAL ENCOUNTER (EMERGENCY)
Facility: CLINIC | Age: 57
Discharge: HOME | End: 2022-04-05
Attending: PHYSICIAN ASSISTANT | Admitting: PHYSICIAN ASSISTANT
Payer: COMMERCIAL

## 2022-04-05 VITALS
WEIGHT: 315 LBS | SYSTOLIC BLOOD PRESSURE: 151 MMHG | TEMPERATURE: 98.1 F | DIASTOLIC BLOOD PRESSURE: 93 MMHG | RESPIRATION RATE: 16 BRPM | HEART RATE: 72 BPM | OXYGEN SATURATION: 95 % | BODY MASS INDEX: 41.75 KG/M2 | HEIGHT: 73 IN

## 2022-04-05 DIAGNOSIS — S61.219A FINGER LACERATION: ICD-10-CM

## 2022-04-05 PROCEDURE — 12001 RPR S/N/AX/GEN/TRNK 2.5CM/<: CPT | Performed by: PHYSICIAN ASSISTANT

## 2022-04-05 PROCEDURE — 99213 OFFICE O/P EST LOW 20 MIN: CPT | Mod: 25 | Performed by: PHYSICIAN ASSISTANT

## 2022-04-05 PROCEDURE — G0463 HOSPITAL OUTPT CLINIC VISIT: HCPCS | Performed by: PHYSICIAN ASSISTANT

## 2022-04-05 NOTE — ED TRIAGE NOTES
Cleaning  at 0800 and accidentally turned it on. Left middle finger. Bleeding controlled. Tetanus UTD.

## 2022-04-05 NOTE — ED PROVIDER NOTES
History     Chief Complaint   Patient presents with     Hand Injury     cleaning  this morning and cut left middle finger - may need tet     HPI  Lupillo Smith is a 56 year right-hand-dominant male who presents to the urgent care with concern over laceration to his left long finger finger which occurred approximately 8 AM this morning.  Patient reports that he was attempting to clean out and hand-held immersion  when he accidentally engaged the blade resulting in laceration to the pad of his distal left long finger.  He complains of mild pain in the area, no concern for foreign body embedded in the wound.  His tetanus vaccine is up to date per Epic records.      Allergies:  Allergies   Allergen Reactions     Oxycodone Nausea and Vomiting     Aspirin Hives     Ibuprofen Hives     Nsaids Swelling       Problem List:    Patient Active Problem List    Diagnosis Date Noted     Diabetes mellitus (H) 09/04/2020     Priority: Medium     S/P total hip arthroplasty 09/03/2020     Priority: Medium     Benign essential hypertension 09/01/2020     Priority: Medium     Primary osteoarthritis of right hip 06/17/2020     Priority: Medium     Morbid obesity (H) 06/04/2019     Priority: Medium     Hypothyroidism 11/06/2018     Priority: Medium     Prediabetes 08/29/2018     Priority: Medium     Hx of papillary thyroid carcinoma 05/02/2016     Priority: Medium     History of total thyroidectomy 09/17/2013     Priority: Medium     Hyperlipidemia 06/27/2007     Priority: Medium     Urticaria 05/22/2007     Priority: Medium     Overview:   Urticaria Chronic          Past Medical History:    Past Medical History:   Diagnosis Date     Arthritis Hips, currently the right hip     Benign essential hypertension 9/1/2020     Cancer (H) Small amount of cancer detected when i had thyroid     Diabetes mellitus (H) 9/4/2020     Hx of thyroid cancer 2013     Thyroid disease Thyroidectomy July 2013     Urticaria        Past  "Surgical History:    Past Surgical History:   Procedure Laterality Date     ARTHROPLASTY HIP Right 9/3/2020    Procedure: ARTHROPLASTY, HIP, TOTAL;  Surgeon: Giuilano Desouza MD;  Location: [facility] OR     BIOPSY  Multiple    Pre-thyroid removal     JOINT REPLACEMENT Left 10/2013    Left hip replacement     ROTATOR CUFF REPAIR RT/LT Right     9/2002- Right shoulder     THYROIDECTOMY  2013     ZZC ANESTH,DX ARTHROSCOPIC PROC KNEE JOINT Right 04/1985       Family History:    Family History   Family history unknown: Yes   Problem Relation Age of Onset     Family history unknown: Yes     Diabetes Maternal Grandmother      Hypertension Father        Social History:  Marital Status:   [2]  Social History     Tobacco Use     Smoking status: Current Some Day Smoker     Types: Cigars     Smokeless tobacco: Never Used     Tobacco comment: One cigar every three months- very very rare   Substance Use Topics     Alcohol use: Yes     Comment: rare     Drug use: No        Medications:    Blood Pressure Monitoring (ADULT BLOOD PRESSURE CUFF LG) KIT  hydrOXYzine (ATARAX) 50 MG tablet  levothyroxine (SYNTHROID/LEVOTHROID) 137 MCG tablet  metFORMIN (GLUCOPHAGE-XR) 750 MG 24 hr tablet      Review of Systems  INTEGUMENTARY/SKIN: POSITIVE for laceration left long finger   MUSCULOSKELETAL: POSITIVE for mild pain of left long finger at site of laceration   NEURO: NEGATIVE for numbness, paresthesias in left upper extremity   Physical Exam   BP: (!) 151/93  Pulse: 72  Temp: 98.1  F (36.7  C)  Resp: 16  Height: 185.4 cm (6' 1\")  Weight: (!) 151.5 kg (334 lb)  SpO2: 95 %  Physical Exam  Constitutional:       General: He is not in acute distress.  Musculoskeletal:      Left wrist: Normal.      Left hand: Swelling, laceration (1.5 cm irregularly shaped to distal long finger) and tenderness present. No bony tenderness. Normal range of motion. Normal strength. Normal sensation.   Skin:     General: Skin is warm and dry.      Findings: " Ecchymosis and laceration present. No abrasion, erythema or rash.   Neurological:      Mental Status: He is alert.      Sensory: No sensory deficit.          ED Course           [facility]    -Laceration Repair  Performed by: Madison Fabian PA-C  Authorized by: Madison Fabian PA-C     Risks, benefits and alternatives discussed.    LACERATION DETAILS     Location:  Finger    Finger location:  L long finger    Length (cm):  1.5  EXPLORATION:     Hemostasis achieved with:  Direct pressure    TREATMENT:     Area cleansed with:  Hibiclens    Amount of cleaning:  Standard    Visualized foreign bodies/material removed: no      SKIN REPAIR     Repair method:  Sutures    Suture size:  4-0    Suture material:  Nylon    Suture technique:  Simple interrupted    Number of sutures:  5    APPROXIMATION     Approximation:  Close    POST-PROCEDURE DETAILS     Dressing:  Antibiotic ointment and tube gauze             Critical Care time:  none        No results found for this or any previous visit (from the past 24 hour(s)).  Medications - No data to display    Assessments & Plan (with Medical Decision Making)     I have reviewed the nursing notes.    I have reviewed the findings, diagnosis, plan and need for follow up with the patient.       Discharge Medication List as of 4/5/2022  1:57 PM        Final diagnoses:   Finger laceration     36-year-old male presents to the urgent care with concern of a laceration to his left long finger which occurred from an injury early this morning approximately 7 AM.  Physical exam findings are significant for an irregularly shaped laceration to the distal tip of his left long finger.  After discussing risk/benefits patient agreed to proceed with primary close of his wound with sutures.  He tolerated placement of sutures without any immediate complications.  He was discharged home stable with instructions for suture removal in 7-10 days.  Suture care instructions, signs  of infection, worrisome reasons to return to ER/UC sooner discussed.     Disclaimer: This note consists of symbols derived from keyboarding, dictation, and/or voice recognition software. As a result, there may be errors in the script that have gone undetected.  Please consider this when interpreting information found in the chart.      4/5/2022   [facility] EMERGENCY DEPT     Madison Fabian PA-C  04/09/22 [id number]

## 2022-06-01 ENCOUNTER — TELEPHONE (OUTPATIENT)
Dept: FAMILY MEDICINE | Facility: CLINIC | Age: 57
End: 2022-06-01
Payer: COMMERCIAL

## 2022-06-01 DIAGNOSIS — R73.03 PREDIABETES: ICD-10-CM

## 2022-06-01 DIAGNOSIS — E89.0 HISTORY OF TOTAL THYROIDECTOMY: ICD-10-CM

## 2022-06-01 DIAGNOSIS — Z85.850 HX OF PAPILLARY THYROID CARCINOMA: Primary | ICD-10-CM

## 2022-06-01 DIAGNOSIS — E03.9 HYPOTHYROIDISM, UNSPECIFIED TYPE: ICD-10-CM

## 2022-06-01 RX ORDER — LEVOTHYROXINE SODIUM 137 UG/1
274 TABLET ORAL DAILY
Qty: 180 TABLET | Refills: 2 | Status: SHIPPED | OUTPATIENT
Start: 2022-06-01 | End: 2023-03-10

## 2022-06-01 RX ORDER — METFORMIN HYDROCHLORIDE 750 MG/1
1500 TABLET, EXTENDED RELEASE ORAL DAILY
Qty: 180 TABLET | Refills: 1 | Status: SHIPPED | OUTPATIENT
Start: 2022-06-01 | End: 2022-12-12

## 2022-07-09 ENCOUNTER — HEALTH MAINTENANCE LETTER (OUTPATIENT)
Age: 57
End: 2022-07-09

## 2022-09-03 ENCOUNTER — HEALTH MAINTENANCE LETTER (OUTPATIENT)
Age: 57
End: 2022-09-03

## 2022-11-21 DIAGNOSIS — L50.1 CHRONIC IDIOPATHIC URTICARIA: ICD-10-CM

## 2022-11-21 RX ORDER — HYDROXYZINE HYDROCHLORIDE 50 MG/1
50 TABLET, FILM COATED ORAL EVERY 8 HOURS PRN
Qty: 90 TABLET | Refills: 3 | Status: SHIPPED | OUTPATIENT
Start: 2022-11-21 | End: 2023-03-02

## 2022-11-21 NOTE — TELEPHONE ENCOUNTER
Requested Prescriptions   Pending Prescriptions Disp Refills     hydrOXYzine (ATARAX) 50 MG tablet 90 tablet 3     Sig: Take 1 tablet (50 mg) by mouth every 8 hours as needed for itching .  Follow-up visit needed for further refills       There is no refill protocol information for this order          Last office visit: 2/23/2021 with prescribing provider:  Corey Welsh   Future Office Visit:   Next 5 appointments (look out 90 days)    Dec 15, 2022 10:00 AM  Return Visit with Corey Welsh MD  [facility] ([facility] [place] ) [address] [address]  [phone]

## 2022-11-21 NOTE — TELEPHONE ENCOUNTER
Prescription approved per [facility] Refill Protocol.    Christie BARONE RN  Specialty/Allergy Clinics    Antihistamines Protocol Passed 11/21/2022 10:46 AM   Protocol Details  Recent (12 mo) or future (30 days) visit within the authorizing provider's specialty    Patient is age 3 or older    Medication is active on med list

## 2022-12-09 DIAGNOSIS — R73.03 PREDIABETES: ICD-10-CM

## 2022-12-12 RX ORDER — METFORMIN HYDROCHLORIDE 750 MG/1
1500 TABLET, EXTENDED RELEASE ORAL DAILY
Qty: 60 TABLET | Refills: 0 | Status: SHIPPED | OUTPATIENT
Start: 2022-12-12 | End: 2023-01-15

## 2022-12-12 NOTE — TELEPHONE ENCOUNTER
"Requested Prescriptions   Pending Prescriptions Disp Refills    metFORMIN (GLUCOPHAGE-XR) 750 MG 24 hr tablet [Pharmacy Med Name: metFORMIN HCl ER Oral Tablet Extended Release 24 Hour 750 MG] 180 tablet 0     Sig: Take 2 tablets (1,500 mg) by mouth daily       Biguanide Agents Failed - 12/9/2022  2:00 AM        Failed - Patient has documented A1c within the specified period of time.     If HgbA1C is 8 or greater, it needs to be on file within the past 3 months.  If less than 8, must be on file within the past 6 months.     Recent Labs   Lab Test 03/10/22  0924   A1C 5.8*             Failed - Recent (6 mo) or future (30 days) visit within the authorizing provider's specialty     Patient had office visit in the last 6 months or has a visit in the next 30 days with authorizing provider or within the authorizing provider's specialty.  See \"Patient Info\" tab in inbasket, or \"Choose Columns\" in Meds & Orders section of the refill encounter.            Passed - Patient is age 10 or older        Passed - Patient's CR is NOT>1.4 OR Patient's EGFR is NOT<45 within past 12 mos.     Recent Labs   Lab Test 03/10/22  0924 09/04/20  0535   GFRESTIMATED >90 >90   GFRESTBLACK  --  >90       Recent Labs   Lab Test 03/10/22  0924   CR 0.79             Passed - Patient does NOT have a diagnosis of CHF.        Passed - Medication is active on med list             "

## 2022-12-20 ENCOUNTER — OFFICE VISIT (OUTPATIENT)
Dept: ENDOCRINOLOGY | Facility: CLINIC | Age: 57
End: 2022-12-20
Payer: COMMERCIAL

## 2022-12-20 ENCOUNTER — LAB (OUTPATIENT)
Dept: LAB | Facility: CLINIC | Age: 57
End: 2022-12-20
Payer: COMMERCIAL

## 2022-12-20 VITALS
HEART RATE: 91 BPM | SYSTOLIC BLOOD PRESSURE: 132 MMHG | DIASTOLIC BLOOD PRESSURE: 88 MMHG | BODY MASS INDEX: 46.04 KG/M2 | WEIGHT: 315 LBS

## 2022-12-20 DIAGNOSIS — E89.0 HISTORY OF TOTAL THYROIDECTOMY: ICD-10-CM

## 2022-12-20 DIAGNOSIS — E88.819 INSULIN RESISTANCE: ICD-10-CM

## 2022-12-20 DIAGNOSIS — E66.813 CLASS 3 SEVERE OBESITY DUE TO EXCESS CALORIES WITH SERIOUS COMORBIDITY AND BODY MASS INDEX (BMI) OF 45.0 TO 49.9 IN ADULT (H): ICD-10-CM

## 2022-12-20 DIAGNOSIS — E78.5 DYSLIPIDEMIA: ICD-10-CM

## 2022-12-20 DIAGNOSIS — E89.0 POSTSURGICAL HYPOTHYROIDISM: ICD-10-CM

## 2022-12-20 DIAGNOSIS — R06.81 APNEA: ICD-10-CM

## 2022-12-20 DIAGNOSIS — Z85.850 HX OF PAPILLARY THYROID CARCINOMA: ICD-10-CM

## 2022-12-20 DIAGNOSIS — E11.9 TYPE 2 DIABETES MELLITUS WITHOUT COMPLICATION, WITHOUT LONG-TERM CURRENT USE OF INSULIN (H): ICD-10-CM

## 2022-12-20 DIAGNOSIS — E11.9 TYPE 2 DIABETES MELLITUS WITHOUT COMPLICATION, WITHOUT LONG-TERM CURRENT USE OF INSULIN (H): Primary | ICD-10-CM

## 2022-12-20 DIAGNOSIS — I10 HYPERTENSION, UNSPECIFIED TYPE: ICD-10-CM

## 2022-12-20 DIAGNOSIS — E66.01 CLASS 3 SEVERE OBESITY DUE TO EXCESS CALORIES WITH SERIOUS COMORBIDITY AND BODY MASS INDEX (BMI) OF 45.0 TO 49.9 IN ADULT (H): ICD-10-CM

## 2022-12-20 LAB — HBA1C MFR BLD: 6.2 % (ref 0–5.6)

## 2022-12-20 PROCEDURE — 86800 THYROGLOBULIN ANTIBODY: CPT

## 2022-12-20 PROCEDURE — 84432 ASSAY OF THYROGLOBULIN: CPT | Mod: 90

## 2022-12-20 PROCEDURE — 84443 ASSAY THYROID STIM HORMONE: CPT

## 2022-12-20 PROCEDURE — 82565 ASSAY OF CREATININE: CPT

## 2022-12-20 PROCEDURE — 36415 COLL VENOUS BLD VENIPUNCTURE: CPT

## 2022-12-20 PROCEDURE — 82043 UR ALBUMIN QUANTITATIVE: CPT

## 2022-12-20 PROCEDURE — 99215 OFFICE O/P EST HI 40 MIN: CPT | Performed by: INTERNAL MEDICINE

## 2022-12-20 PROCEDURE — 83036 HEMOGLOBIN GLYCOSYLATED A1C: CPT

## 2022-12-20 PROCEDURE — 99000 SPECIMEN HANDLING OFFICE-LAB: CPT

## 2022-12-20 PROCEDURE — 82570 ASSAY OF URINE CREATININE: CPT

## 2022-12-20 PROCEDURE — 80061 LIPID PANEL: CPT

## 2022-12-20 PROCEDURE — 82947 ASSAY GLUCOSE BLOOD QUANT: CPT

## 2022-12-20 RX ORDER — TIRZEPATIDE 2.5 MG/.5ML
2.5 INJECTION, SOLUTION SUBCUTANEOUS
Qty: 2 ML | Refills: 1 | Status: SHIPPED | OUTPATIENT
Start: 2022-12-20 | End: 2023-03-02

## 2022-12-20 NOTE — LETTER
"    12/20/2022         RE: Lupillo Smith  [address] [address] [place] [address]        Dear Colleague,    Thank you for referring your patient, Lupillo Smith, to the [facility]. Please see a copy of my visit note below.    Subjective:    New patient, previously saw Dr. Hughes (most recently 3/2022)    Lupillo Smith is a 57 year old male who presents for hypothyroidism and DM-2.  The following is a comprehensive summary of his endocrine care to date, chart fully reviewed for all endocrinopathies.    # Micro papillary thyroid carcinoma s/p total thyroidectomy in 2013  # Hypothyroidism     He had a long-standing history of thyroid nodules and ultimately underwent total thyroidectomy in 2013. Pre-op he did have SOB from compression. Pre-op: no dysphagia, no hoarseness. No prior H/N radiation. No FH of thyroid cancer.     7/15/2013: underwent total thyroidectomy (thyroid was reported as 337 grams of pathology specimen), per [facility] Endocrine notes the indication for thyroidectomy was an enlarging goiter with tracheal deviation and the micro PTC was an incidental finding. I did read the operative report (Dr. Arnaud Jones) with the following notable findings: \"the gland was massively enlarged\", no mention of any parathyroid gland damage, no mention of gross extrathyroidal extension, no mention of lymphadenopathy.                            FINAL SURGICAL PATHOLOGY REPORT           Pathology #: [id number]                     Date Obtained: 07/15/2013                                                 Date Received: 07/15/2013     DIAGNOSIS:    Thyroid, total thyroidectomy:      PROCEDURE:                         Total thyroidectomy    RECEIVED:                          In formalin    SPECIMEN INTEGRITY:                Intact    SPECIMEN SIZE:     Right lobe:                       13 x 8.5 x 7.5 cm     Left lobe:                        5.8 x 5 x 2.5 cm      SPECIMEN WEIGHT:                   " "337 g    TUMOR FOCALITY:                    Unifocal      DOMINANT TUMOR:    TUMOR LATERALITY:                  Left lobe    TUMOR SIZE:     Greatest dimension:               0.5 cm    HISTOLOGIC TYPE:                   Papillary carcinoma     Variant, specify:                 Follicular variant     Architecture:                     Follicular     Cytomorphology:                   Classical    HISTOLOGIC GRADE:                  G1: Well differentiated    MARGINS:                           Margins uninvolved by carcinoma     Distance of invasive carcinoma to     closest margin:                   6 mm    TUMOR CAPSULE:                     Partially encapsulated    TUMOR CAPSULAR INVASION:           Not identified    LYMPH-VASCULAR INVASION:           Not identified    EXTRATHYROIDAL EXTENSION:          Not identified      PATHOLOGIC STAGING    PRIMARY TUMOR:                     pT1: Tumor size 2 cm or less,                                       limited to thyroid    REGIONAL LYMPH NODES:              pNX: Regional lymph nodes cannot be                                       assessed     Number examined:                  0     Number involved:                  N/A      ADDITIONAL PATHOLOGIC FINDINGS:    Adenomatous nodule(s) or Nodular                                       follicular disease (eg, nodular                                       hyperplasia, goitrous thyroid).                                       Thryoiditis.        No adjuvant I-131 was given.     Second most recent thyroid [facility] done [facility]/23/2014 [facility]:  -images not available, per OSH radiology read: RICHARD but the lateral neck was reportedly not examined     Most recent thyroid US done 1/28/2019 at [facility]:   -images not available, per OSH radiology read: \"small, normal-appearing lymph nodes are seen in the left and right neck. None have microcystic change or calcification.\"      3/2/2021: per the lab - Tg level \"detected\", \"detected\" means " the value is between 0.2 to 0.8 ng/mL, but a numeric value cannot be reported.    2017: Tg TM 0.2 ng/mL, Tg Ab undetectable, TSH 0.45    2014: Tg TM 0.4 ng/mL, Tg Ab undetectable, TSH 12.35 - this was the highest ever Tg TM post-op    12/20/2022: no compressive symptoms. He has not noticed any nodularity in the neck. No cough. He takes LT4 274 mcg daily, 7 days/week. He takes it appropriately to maximize absorption.     3/2022: serum calcium normal (no prior hypercalcemia); he did not have any issues with parathyroid function after surgery     # DM-2    Diagnosed with DM: prediabetic range HbA1c since ~2007    History of DKA/HHS: no     FH of DM: paternal grandmother with DM-2     FH of autoimmune disease: no    Glycemic control over the years:         The first HbA1c value is from 2007 and was 6.0%, peak HbA1c over the years was 6.3% in 2016        Labs done 9/2020 were perioperative.     Current DM therapy:  -Metformin XR used for a few years - current dose 1500 mg daily; generally well tolerated     Prior DM therapy:  -Only metformin     Current glycemic control: doesn't have a glucometer     Diet: 2-3 meals/day, snacks, doesn't drink calories outside of milk     Physical activity: home exercise equipment (aerobics) around once weekly     Tobacco/alcohol use: no    Complications noted in the A/P section.     No prior weight loss surgery/procedure. No prior weight loss medication.    No personal/FH: pancreatitis, pancreatic cancer, MTC, MEN     In the past strict lifestyle changes have worked well for weight loss. Current BMI 46 kg/m2 and weight has been gradually increasing.    Objective:    BMI 46 kg/m2, /88    No overt Cushingoid features.     No thyroid eye disease.    Well healed lower anterior neck thyroidectomy scar. No nodularity in the thyroid bed. No cervical LAD.    Radial pulse with a regular rate and rhythm.    DM foot exam performed and normal.     Assessment/Plan:    # Micro papillary thyroid  carcinoma s/p total thyroidectomy in 2013  # Hypothyroidism     We will check a TSH and thyroglobulin tumor marker and antibody today.    # DM-2  # No prior DM eye exam   # Hypertension, hasn't been on an ACE-I/ARB    -3/2022: GFR >90  # Possible mild peripheral neuropathy   # No prior ASCVD event   # Dyslipidemia, not on ASA or a statin   -3/2022: , , HDL 48,   # Medically complicated obesity     In addition to metformin we will add Mounjaro starting at 2.5 mg once weekly and increase to the maximally tolerated dose.  Stanton will let me know via [employer] in a few weeks how the current dose is working in terms of tolerability and efficacy.  I ordered basic labs today including testing for microvascular complications and a referral for a diabetic eye exam.    We also reviewed calorie tracking and aiming for a calorie deficit around 1000 kcal/day.    Return to see me in a few months.    # Suspected sleep apnea    He notes episodes of apnea at night.  Referred to sleep medicine.    60 minutes spent on the date of the encounter doing chart review, history and exam, documentation and further activities as noted above.       Again, thank you for allowing me to participate in the care of your patient.        Sincerely,        Charan Hodgson MD

## 2022-12-20 NOTE — PROGRESS NOTES
"Subjective:    New patient, previously saw Dr. Hughes (most recently 3/2022)    Lupillo Smith is a 57 year old male who presents for hypothyroidism and DM-2.  The following is a comprehensive summary of his endocrine care to date, chart fully reviewed for all endocrinopathies.    # Micro papillary thyroid carcinoma s/p total thyroidectomy in 2013  # Hypothyroidism     He had a long-standing history of thyroid nodules and ultimately underwent total thyroidectomy in 2013. Pre-op he did have SOB from compression. Pre-op: no dysphagia, no hoarseness. No prior H/N radiation. No FH of thyroid cancer.     7/15/2013: underwent total thyroidectomy (thyroid was reported as 337 grams of pathology specimen), per [facility] Endocrine notes the indication for thyroidectomy was an enlarging goiter with tracheal deviation and the micro PTC was an incidental finding. I did read the operative report (Dr. Arnaud Jones) with the following notable findings: \"the gland was massively enlarged\", no mention of any parathyroid gland damage, no mention of gross extrathyroidal extension, no mention of lymphadenopathy.                            FINAL SURGICAL PATHOLOGY REPORT           Pathology #: [id number]                     Date Obtained: 07/15/2013                                                 Date Received: 07/15/2013     DIAGNOSIS:    Thyroid, total thyroidectomy:      PROCEDURE:                         Total thyroidectomy    RECEIVED:                          In formalin    SPECIMEN INTEGRITY:                Intact    SPECIMEN SIZE:     Right lobe:                       13 x 8.5 x 7.5 cm     Left lobe:                        5.8 x 5 x 2.5 cm      SPECIMEN WEIGHT:                   337 g    TUMOR FOCALITY:                    Unifocal      DOMINANT TUMOR:    TUMOR LATERALITY:                  Left lobe    TUMOR SIZE:     Greatest dimension:               0.5 cm    HISTOLOGIC TYPE:                   Papillary carcinoma     Variant, " "specify:                 Follicular variant     Architecture:                     Follicular     Cytomorphology:                   Classical    HISTOLOGIC GRADE:                  G1: Well differentiated    MARGINS:                           Margins uninvolved by carcinoma     Distance of invasive carcinoma to     closest margin:                   6 mm    TUMOR CAPSULE:                     Partially encapsulated    TUMOR CAPSULAR INVASION:           Not identified    LYMPH-VASCULAR INVASION:           Not identified    EXTRATHYROIDAL EXTENSION:          Not identified      PATHOLOGIC STAGING    PRIMARY TUMOR:                     pT1: Tumor size 2 cm or less,                                       limited to thyroid    REGIONAL LYMPH NODES:              pNX: Regional lymph nodes cannot be                                       assessed     Number examined:                  0     Number involved:                  N/A      ADDITIONAL PATHOLOGIC FINDINGS:    Adenomatous nodule(s) or Nodular                                       follicular disease (eg, nodular                                       hyperplasia, goitrous thyroid).                                       Thryoiditis.        No adjuvant I-131 was given.     Second most recent thyroid [facility]/23/2014 [facility]:  -images not available, per OSH radiology read: RICHARD but the lateral neck was reportedly not examined     Most recent thyroid US done 1/28/2019 at [facility]:   -images not available, per OSH radiology read: \"small, normal-appearing lymph nodes are seen in the left and right neck. None have microcystic change or calcification.\"      3/2/2021: per the lab - Tg level \"detected\", \"detected\" means the value is between 0.2 to 0.8 ng/mL, but a numeric value cannot be reported.    2017: Tg TM 0.2 ng/mL, Tg Ab undetectable, TSH 0.45    2014: Tg TM 0.4 ng/mL, Tg Ab undetectable, TSH 12.35 - this was the highest ever Tg TM post-op    12/20/2022: no " compressive symptoms. He has not noticed any nodularity in the neck. No cough. He takes LT4 274 mcg daily, 7 days/week. He takes it appropriately to maximize absorption.     3/2022: serum calcium normal (no prior hypercalcemia); he did not have any issues with parathyroid function after surgery     # DM-2    Diagnosed with DM: prediabetic range HbA1c since ~2007    History of DKA/HHS: no     FH of DM: paternal grandmother with DM-2     FH of autoimmune disease: no    Glycemic control over the years:         The first HbA1c value is from 2007 and was 6.0%, peak HbA1c over the years was 6.3% in 2016        Labs done 9/2020 were perioperative.     Current DM therapy:  -Metformin XR used for a few years - current dose 1500 mg daily; generally well tolerated     Prior DM therapy:  -Only metformin     Current glycemic control: doesn't have a glucometer     Diet: 2-3 meals/day, snacks, doesn't drink calories outside of milk     Physical activity: home exercise equipment (aerobics) around once weekly     Tobacco/alcohol use: no    Complications noted in the A/P section.     No prior weight loss surgery/procedure. No prior weight loss medication.    No personal/FH: pancreatitis, pancreatic cancer, MTC, MEN     In the past strict lifestyle changes have worked well for weight loss. Current BMI 46 kg/m2 and weight has been gradually increasing.    Objective:    BMI 46 kg/m2, /88    No overt Cushingoid features.     No thyroid eye disease.    Well healed lower anterior neck thyroidectomy scar. No nodularity in the thyroid bed. No cervical LAD.    Radial pulse with a regular rate and rhythm.    DM foot exam performed and normal.     Assessment/Plan:    # Micro papillary thyroid carcinoma s/p total thyroidectomy in 2013  # Hypothyroidism     We will check a TSH and thyroglobulin tumor marker and antibody today.    # DM-2  # No prior DM eye exam   # Hypertension, hasn't been on an ACE-I/ARB    -3/2022: GFR >90  # Possible  mild peripheral neuropathy   # No prior ASCVD event   # Dyslipidemia, not on ASA or a statin   -3/2022: , , HDL 48,   # Medically complicated obesity     In addition to metformin we will add Mounjaro starting at 2.5 mg once weekly and increase to the maximally tolerated dose.  Stanton will let me know via [employer] in a few weeks how the current dose is working in terms of tolerability and efficacy.  I ordered basic labs today including testing for microvascular complications and a referral for a diabetic eye exam.    We also reviewed calorie tracking and aiming for a calorie deficit around 1000 kcal/day.    Return to see me in a few months.    # Suspected sleep apnea    He notes episodes of apnea at night.  Referred to sleep medicine.    60 minutes spent on the date of the encounter doing chart review, history and exam, documentation and further activities as noted above.

## 2022-12-21 LAB
CHOLEST SERPL-MCNC: 262 MG/DL
CREAT SERPL-MCNC: 0.92 MG/DL (ref 0.67–1.17)
CREAT UR-MCNC: 157 MG/DL
FASTING STATUS PATIENT QL REPORTED: NO
GFR SERPL CREATININE-BSD FRML MDRD: >90 ML/MIN/1.73M2
GLUCOSE SERPL-MCNC: 112 MG/DL (ref 70–99)
HDLC SERPL-MCNC: 46 MG/DL
LDLC SERPL CALC-MCNC: 150 MG/DL
MICROALBUMIN UR-MCNC: 21.3 MG/L
MICROALBUMIN/CREAT UR: 13.57 MG/G CR (ref 0–17)
NONHDLC SERPL-MCNC: 216 MG/DL
TRIGL SERPL-MCNC: 329 MG/DL
TSH SERPL DL<=0.005 MIU/L-ACNC: 0.75 UIU/ML (ref 0.3–4.2)

## 2022-12-29 LAB
Lab: NORMAL
MAYO MISC RESULT: NORMAL
PERFORMING LABORATORY: NORMAL
SCANNED LAB RESULT: NORMAL
SCANNED LAB RESULT: NORMAL
TEST NAME: NORMAL
TEST NAME: NORMAL

## 2023-01-09 ENCOUNTER — OFFICE VISIT (OUTPATIENT)
Dept: OPTOMETRY | Facility: CLINIC | Age: 58
End: 2023-01-09
Payer: COMMERCIAL

## 2023-01-09 DIAGNOSIS — E89.0 POSTOPERATIVE HYPOTHYROIDISM: ICD-10-CM

## 2023-01-09 DIAGNOSIS — E11.9 TYPE 2 DIABETES MELLITUS WITHOUT COMPLICATION, WITHOUT LONG-TERM CURRENT USE OF INSULIN (H): Primary | ICD-10-CM

## 2023-01-09 DIAGNOSIS — H52.13 MYOPIA OF BOTH EYES: ICD-10-CM

## 2023-01-09 DIAGNOSIS — R73.03 PREDIABETES: ICD-10-CM

## 2023-01-09 DIAGNOSIS — H52.4 PRESBYOPIA: ICD-10-CM

## 2023-01-09 DIAGNOSIS — H52.223 REGULAR ASTIGMATISM OF BOTH EYES: ICD-10-CM

## 2023-01-09 PROCEDURE — 92004 COMPRE OPH EXAM NEW PT 1/>: CPT | Performed by: OPTOMETRIST

## 2023-01-09 PROCEDURE — 92015 DETERMINE REFRACTIVE STATE: CPT | Performed by: OPTOMETRIST

## 2023-01-09 RX ORDER — METFORMIN HYDROCHLORIDE 750 MG/1
1500 TABLET, EXTENDED RELEASE ORAL DAILY
Qty: 60 TABLET | Refills: 0 | OUTPATIENT
Start: 2023-01-09

## 2023-01-09 ASSESSMENT — REFRACTION_MANIFEST
OS_SPHERE: -1.00
OD_CYLINDER: +1.50
OS_ADD: +2.50
OD_ADD: +2.50
OD_SPHERE: -1.50
OD_AXIS: 100
OS_CYLINDER: +0.75
OS_CYLINDER: +0.75
OD_CYLINDER: +1.25
OS_AXIS: 097
OD_SPHERE: -1.75
OS_SPHERE: -1.00
OS_AXIS: 097
OD_AXIS: 099

## 2023-01-09 ASSESSMENT — VISUAL ACUITY
METHOD: SNELLEN - LINEAR
OD_SC: 20/30
OD_SC: 20/50
OS_SC: 20/50
OS_SC: 20/30

## 2023-01-09 ASSESSMENT — KERATOMETRY
OD_K1POWER_DIOPTERS: 41.75
OS_K2POWER_DIOPTERS: 44.00
OD_AXISANGLE_DEGREES: 091
OS_AXISANGLE_DEGREES: 083
OS_K1POWER_DIOPTERS: 42.75
OS_AXISANGLE2_DEGREES: 173
OD_K2POWER_DIOPTERS: 43.50
OD_AXISANGLE2_DEGREES: 001

## 2023-01-09 ASSESSMENT — CONF VISUAL FIELD
OS_SUPERIOR_TEMPORAL_RESTRICTION: 0
OS_SUPERIOR_NASAL_RESTRICTION: 0
OS_NORMAL: 1
OD_SUPERIOR_TEMPORAL_RESTRICTION: 0
OD_SUPERIOR_NASAL_RESTRICTION: 0
OS_INFERIOR_TEMPORAL_RESTRICTION: 0
METHOD: COUNTING FINGERS
OD_INFERIOR_NASAL_RESTRICTION: 0
OS_INFERIOR_NASAL_RESTRICTION: 0
OD_INFERIOR_TEMPORAL_RESTRICTION: 0
OD_NORMAL: 1

## 2023-01-09 ASSESSMENT — EXTERNAL EXAM - LEFT EYE: OS_EXAM: NORMAL

## 2023-01-09 ASSESSMENT — SLIT LAMP EXAM - LIDS
COMMENTS: NORMAL
COMMENTS: NORMAL

## 2023-01-09 ASSESSMENT — CUP TO DISC RATIO
OS_RATIO: 0.3
OD_RATIO: 0.3

## 2023-01-09 ASSESSMENT — TONOMETRY
IOP_METHOD: APPLANATION
OS_IOP_MMHG: 17
OD_IOP_MMHG: 17

## 2023-01-09 ASSESSMENT — EXTERNAL EXAM - RIGHT EYE: OD_EXAM: NORMAL

## 2023-01-09 NOTE — PATIENT INSTRUCTIONS
Patient Education   Diabetes weakens the blood vessels all over the body, including the eyes. Damage to the blood vessels in the eyes can cause swelling or bleeding into part of the eye (called the retina). This is called diabetic retinopathy (PRIYANKA-tin-AH-puh-thee). If not treated, this disease can cause vision loss or blindness.   Symptoms may include blurred or distorted vision, but many people have no symptoms. It's important to see your eye doctor regularly to check for problems.   Early treatment and good control can help protect your vision. Here are the things you can do to help prevent vision loss:      1. Keep your blood sugar levels under tight control.      2. Bring high blood pressure under control.      3. No smoking.      4. Have yearly dilated eye exams.     Myopia is a result of long eyes. It is commonly referred to as near-sightedness. Seeing clearly in the distance is the main challenge.    Astigmatism results from curvature differential in the cornea and crystalline lens which can cause a distorted image, as light rays are prevented from meeting at a common focus.    Presbyopia is the diagnosis. Presbyopia is an age-related condition where the eye's crystalline lens doesn't change shape as easily as it once did.    Eyeglass prescription given.    The affects of the dilating drops last for 4- 6 hours.  You will be more sensitive to light and vision will be blurry up close.  Do not drive if you do not feel comfortable.  Mydriatic sunglasses were given if needed.    Recommend annual eye exams.    Marika Motley O.D.  [address] [address]    [phone]

## 2023-01-09 NOTE — PROGRESS NOTES
Chief Complaint   Patient presents with     Diabetic Eye Exam     Prediabetic      Chief Complaint(s) and History of Present Illness(es)     Diabetic Eye Exam            Diabetes Type: taking oral medications    Duration: 5 years    Comments: Prediabetic                Lab Results   Component Value Date    A1C 6.2 12/20/2022    A1C 5.8 03/10/2022    A1C 5.9 03/02/2021    A1C 5.9 08/25/2020    A1C 5.7 12/30/2019    A1C 6.2 08/29/2018          Hemoglobin A1C   Date Value Ref Range Status   12/20/2022 6.2 (H) 0.0 - 5.6 % Final     Comment:     Normal <5.7%   Prediabetes 5.7-6.4%    Diabetes 6.5% or higher     Note: Adopted from ADA consensus guidelines.   03/02/2021 5.9 (H) 0 - 5.6 % Final     Comment:     Normal <5.7% Prediabetes 5.7-6.4%  Diabetes 6.5% or higher - adopted from ADA   consensus guidelines.         Last Eye Exam: First Eye Exam  Dilated Previously: Yes, side effects of dilation explained today    What are you currently using to see?  does not use glasses or contacts     Distance Vision Acuity: Noticed gradual change in both eyes. Notices he is squints often.     Near Vision Acuity: Satisfied with vision while reading  unaided    Eye Comfort: good  Do you use eye drops? : No  Occupation or Hobbies: Casino Manager    Janice Neil,       Medical, surgical and family histories reviewed and updated 1/9/2023.       OBJECTIVE: See Ophthalmology exam    ASSESSMENT:    ICD-10-CM    1. Postoperative hypothyroidism - Both Eyes  E89.0       2. Type 2 diabetes mellitus without complication, without long-term current use of insulin (H) - Both Eyes  E11.9 Adult Eye  Referral      3. Myopia of both eyes - Both Eyes  H52.13       4. Regular astigmatism of both eyes - Both Eyes  H52.223       5. Presbyopia - Both Eyes  H52.4           PLAN:    Lupillo rodriguez  eye exam results will be sent to No Ref-Primary, Physician.  Patient Instructions   Patient Education  Diabetes weakens the blood vessels all  over the body, including the eyes. Damage to the blood vessels in the eyes can cause swelling or bleeding into part of the eye (called the retina). This is called diabetic retinopathy (PRIYANKA-tin-AH-puh-thee). If not treated, this disease can cause vision loss or blindness.   Symptoms may include blurred or distorted vision, but many people have no symptoms. It's important to see your eye doctor regularly to check for problems.   Early treatment and good control can help protect your vision. Here are the things you can do to help prevent vision loss:      1. Keep your blood sugar levels under tight control.      2. Bring high blood pressure under control.      3. No smoking.      4. Have yearly dilated eye exams.     Myopia is a result of long eyes. It is commonly referred to as near-sightedness. Seeing clearly in the distance is the main challenge.    Astigmatism results from curvature differential in the cornea and crystalline lens which can cause a distorted image, as light rays are prevented from meeting at a common focus.    Presbyopia is the diagnosis. Presbyopia is an age-related condition where the eye's crystalline lens doesn't change shape as easily as it once did.    Eyeglass prescription given.    The affects of the dilating drops last for 4- 6 hours.  You will be more sensitive to light and vision will be blurry up close.  Do not drive if you do not feel comfortable.  Mydriatic sunglasses were given if needed.    Recommend annual eye exams.    Marika Motley O.D.  [address] [address]    [phone]

## 2023-01-09 NOTE — TELEPHONE ENCOUNTER
"Requested Prescriptions   Pending Prescriptions Disp Refills    metFORMIN (GLUCOPHAGE-XR) 750 MG 24 hr tablet [Pharmacy Med Name: metFORMIN HCl ER Oral Tablet Extended Release 24 Hour 750 MG] 60 tablet 0     Sig: Take 2 tablets (1,500 mg) by mouth daily       Biguanide Agents Failed - 1/9/2023  2:00 AM        Failed - Recent (6 mo) or future (30 days) visit within the authorizing provider's specialty     Patient had office visit in the last 6 months or has a visit in the next 30 days with authorizing provider or within the authorizing provider's specialty.  See \"Patient Info\" tab in inbasket, or \"Choose Columns\" in Meds & Orders section of the refill encounter.            Passed - Patient is age 10 or older        Passed - Patient has documented A1c within the specified period of time.     If HgbA1C is 8 or greater, it needs to be on file within the past 3 months.  If less than 8, must be on file within the past 6 months.     Recent Labs   Lab Test 12/20/22  1606   A1C 6.2*             Passed - Patient's CR is NOT>1.4 OR Patient's EGFR is NOT<45 within past 12 mos.     Recent Labs   Lab Test 12/20/22  1606 03/10/22  0924 09/04/20  0535   GFRESTIMATED >90   < > >90   GFRESTBLACK  --   --  >90    < > = values in this interval not displayed.       Recent Labs   Lab Test 12/20/22  1606   CR 0.92             Passed - Patient does NOT have a diagnosis of CHF.        Passed - Medication is active on med list             "

## 2023-01-09 NOTE — LETTER
1/9/2023         RE: Lupillo Smith  [address] [address] [facility] [address]        Dear Colleague,    Thank you for referring your patient, Lupillo Smith, to the [facility]. Please see a copy of my visit note below.    Chief Complaint   Patient presents with     Diabetic Eye Exam     Prediabetic      Chief Complaint(s) and History of Present Illness(es)     Diabetic Eye Exam            Diabetes Type: taking oral medications    Duration: 5 years    Comments: Prediabetic                Lab Results   Component Value Date    A1C 6.2 12/20/2022    A1C 5.8 03/10/2022    A1C 5.9 03/02/2021    A1C 5.9 08/25/2020    A1C 5.7 12/30/2019    A1C 6.2 08/29/2018          Hemoglobin A1C   Date Value Ref Range Status   12/20/2022 6.2 (H) 0.0 - 5.6 % Final     Comment:     Normal <5.7%   Prediabetes 5.7-6.4%    Diabetes 6.5% or higher     Note: Adopted from ADA consensus guidelines.   03/02/2021 5.9 (H) 0 - 5.6 % Final     Comment:     Normal <5.7% Prediabetes 5.7-6.4%  Diabetes 6.5% or higher - adopted from ADA   consensus guidelines.         Last Eye Exam: First Eye Exam  Dilated Previously: Yes, side effects of dilation explained today    What are you currently using to see?  does not use glasses or contacts     Distance Vision Acuity: Noticed gradual change in both eyes. Notices he is squints often.     Near Vision Acuity: Satisfied with vision while reading  unaided    Eye Comfort: good  Do you use eye drops? : No  Occupation or Hobbies: Casino Manager    Janice Neil, HARRIET      Medical, surgical and family histories reviewed and updated 1/9/2023.       OBJECTIVE: See Ophthalmology exam    ASSESSMENT:    ICD-10-CM    1. Postoperative hypothyroidism - Both Eyes  E89.0       2. Type 2 diabetes mellitus without complication, without long-term current use of insulin (H) - Both Eyes  E11.9 Adult Eye  Referral      3. Myopia of both eyes - Both Eyes  H52.13       4. Regular astigmatism of  both eyes - Both Eyes  H52.223       5. Presbyopia - Both Eyes  H52.4           PLAN:    Lupillo Smith aware  eye exam results will be sent to No Ref-Primary, Physician.  Patient Instructions   Patient Education  Diabetes weakens the blood vessels all over the body, including the eyes. Damage to the blood vessels in the eyes can cause swelling or bleeding into part of the eye (called the retina). This is called diabetic retinopathy (PRIYANKA-tin--pu-thee). If not treated, this disease can cause vision loss or blindness.   Symptoms may include blurred or distorted vision, but many people have no symptoms. It's important to see your eye doctor regularly to check for problems.   Early treatment and good control can help protect your vision. Here are the things you can do to help prevent vision loss:      1. Keep your blood sugar levels under tight control.      2. Bring high blood pressure under control.      3. No smoking.      4. Have yearly dilated eye exams.     Myopia is a result of long eyes. It is commonly referred to as near-sightedness. Seeing clearly in the distance is the main challenge.    Astigmatism results from curvature differential in the cornea and crystalline lens which can cause a distorted image, as light rays are prevented from meeting at a common focus.    Presbyopia is the diagnosis. Presbyopia is an age-related condition where the eye's crystalline lens doesn't change shape as easily as it once did.    Eyeglass prescription given.    The affects of the dilating drops last for 4- 6 hours.  You will be more sensitive to light and vision will be blurry up close.  Do not drive if you do not feel comfortable.  Mydriatic sunglasses were given if needed.    Recommend annual eye exams.    Marika Motley O.D.  [address] [address]    [phone]               Again, thank you for allowing me to participate in the care of your patient.         Sincerely,        Marika Motley OD     08-Sep-2022

## 2023-01-14 ENCOUNTER — HEALTH MAINTENANCE LETTER (OUTPATIENT)
Age: 58
End: 2023-01-14

## 2023-03-02 ENCOUNTER — OFFICE VISIT (OUTPATIENT)
Dept: ALLERGY | Facility: CLINIC | Age: 58
End: 2023-03-02
Payer: COMMERCIAL

## 2023-03-02 VITALS
SYSTOLIC BLOOD PRESSURE: 131 MMHG | HEART RATE: 90 BPM | DIASTOLIC BLOOD PRESSURE: 87 MMHG | WEIGHT: 315 LBS | HEIGHT: 73 IN | OXYGEN SATURATION: 95 % | BODY MASS INDEX: 41.75 KG/M2

## 2023-03-02 DIAGNOSIS — L50.1 CHRONIC IDIOPATHIC URTICARIA: ICD-10-CM

## 2023-03-02 PROCEDURE — 99213 OFFICE O/P EST LOW 20 MIN: CPT | Performed by: ALLERGY & IMMUNOLOGY

## 2023-03-02 RX ORDER — HYDROXYZINE HYDROCHLORIDE 50 MG/1
50 TABLET, FILM COATED ORAL DAILY PRN
Qty: 90 TABLET | Refills: 3 | Status: SHIPPED | OUTPATIENT
Start: 2023-03-02 | End: 2024-04-03

## 2023-03-02 RX ORDER — HYDROXYZINE HYDROCHLORIDE 50 MG/1
50 TABLET, FILM COATED ORAL EVERY 8 HOURS PRN
Qty: 90 TABLET | Refills: 3 | Status: SHIPPED | OUTPATIENT
Start: 2023-03-02 | End: 2023-03-02

## 2023-03-02 ASSESSMENT — ENCOUNTER SYMPTOMS
FACIAL SWELLING: 0
CHEST TIGHTNESS: 0
SHORTNESS OF BREATH: 0
VOMITING: 0
EYE REDNESS: 0
FEVER: 0
ACTIVITY CHANGE: 0
DIARRHEA: 0
FATIGUE: 0
COUGH: 0
EYE ITCHING: 0
NAUSEA: 0
EYE DISCHARGE: 0
HEADACHES: 0
SINUS PRESSURE: 0
WHEEZING: 0
RHINORRHEA: 0

## 2023-03-02 ASSESSMENT — PAIN SCALES - GENERAL: PAINLEVEL: NO PAIN (0)

## 2023-03-02 NOTE — LETTER
3/2/2023         RE: Lupillo Smith  [address] [address] [facility] [facility] [address]        Dear Colleague,    Thank you for referring your patient, Lupillo Smith, to the [facility]. Please see a copy of my visit note below.    SUBJECTIVE:                                                                   Lupillo Smith presents today to our Allergy Clinic at [facility] for a follow up visit. He is a 57 year old male with a history of chronic idiopathic urticaria.  History of hives that started when he was a teenager. Previously managed by Dr. Garth Lilly, [facility] allergist.    For the first several years, he was on different oral antihistamines which he failed, and ultimately switched to hydroxyzine.  Depending on symptom control, he would be taking hydroxyzine from once a day to several times a day.  Until his mid-30s, he had more issues even when he was taking hydroxyzine several times a day consistently. Things got better with age.   He takes hydroxyzine 50 mg by mouth once daily, 5 days a week. He may wake up with hives if he misses hydroxyzine for 3 days. It happened on several occasions within the past year.  As long as he is consistent with hydroxyzine, he is doing well. He has some seasonal allergies in the Spring and Fall, but as long as he takes hydroxyzine, symptoms are well controlled.      Patient Active Problem List   Diagnosis     Prediabetes     Hypothyroidism     Morbid obesity (H)     Urticaria     Hyperlipidemia     Hx of papillary thyroid carcinoma     History of total thyroidectomy     Primary osteoarthritis of right hip     Benign essential hypertension     S/P total hip arthroplasty     Diabetes mellitus (H)       Past Medical History:   Diagnosis Date     Arthritis Hips, currently the right hip     Benign essential hypertension 9/1/2020     Cancer (H) Small amount of cancer detected when i had thyroid     Diabetes mellitus (H)  9/4/2020     Hx of thyroid cancer 2013     Thyroid disease Thyroidectomy July 2013     Urticaria       Problem (# of Occurrences) Relation (Name,Age of Onset)    Diabetes (1) Maternal Grandmother (Zaynab)    Hypertension (1) Father (Uvaldo)       Negative family history of: Glaucoma, Macular Degeneration        Past Surgical History:   Procedure Laterality Date     ARTHROPLASTY HIP Right 9/3/2020    Procedure: ARTHROPLASTY, HIP, TOTAL;  Surgeon: Giuliano Desouza MD;  Location: [facility] OR     BIOPSY  Multiple    Pre-thyroid removal     JOINT REPLACEMENT Left 10/2013    Left hip replacement     ROTATOR CUFF REPAIR RT/LT Right     9/2002- Right shoulder     THYROIDECTOMY  2013     ZZC ANESTH,DX ARTHROSCOPIC PROC KNEE JOINT Right 04/1985     Social History     Socioeconomic History     Marital status:      Spouse name: None     Number of children: None     Years of education: None     Highest education level: None   Occupational History     Employer: [employer]   Tobacco Use     Smoking status: Some Days     Types: Cigars     Smokeless tobacco: Never     Tobacco comments:     One cigar every three months- very very rare   Vaping Use     Vaping Use: Never used   Substance and Sexual Activity     Alcohol use: Yes     Comment: rare     Drug use: No     Sexual activity: Yes     Partners: Female     Birth control/protection: None   Social History Narrative    February 23, 2021    ENVIRONMENTAL HISTORY: [place] The home is heated with a forced air, wood stove and gas fireplace. They do have central air conditioning. The patient's bedroom is furnished with carpeting in bedroom and fabric window coverings.  Pets inside the house include 2 dogs. There is no history of cockroach or mice infestation. There are no smokers in the house.  The house does not have a damp basement.            Review of Systems   Constitutional: Negative for activity change, fatigue  "and fever.   HENT: Negative for congestion, ear pain, facial swelling, nosebleeds, postnasal drip, rhinorrhea, sinus pressure and sneezing.    Eyes: Negative for discharge, redness and itching.   Respiratory: Negative for cough, chest tightness, shortness of breath and wheezing.    Gastrointestinal: Negative for diarrhea, nausea and vomiting.   Skin: Negative for rash.   Allergic/Immunologic: Negative for environmental allergies.   Neurological: Negative for headaches.   Psychiatric/Behavioral: Negative for self-injury.           Current Outpatient Medications:      Blood Pressure Monitoring (ADULT BLOOD PRESSURE CUFF LG) KIT, 1 each every other day, Disp: 1 kit, Rfl: 0     hydrOXYzine (ATARAX) 50 MG tablet, Take 1 tablet (50 mg) by mouth every 8 hours as needed for itching, Disp: 90 tablet, Rfl: 3     levothyroxine (SYNTHROID/LEVOTHROID) 137 MCG tablet, Take 2 tablets (274 mcg) by mouth daily, Disp: 180 tablet, Rfl: 2     metFORMIN (GLUCOPHAGE-XR) 750 MG 24 hr tablet, Take 2 tablets (1,500 mg) by mouth daily, Disp: 180 tablet, Rfl: 3     Tirzepatide (MOUNJARO) 7.5 MG/0.5ML pen, Inject 7.5 mg Subcutaneous every 7 days, Disp: 2 mL, Rfl: 0    Current Facility-Administered Medications:      ropivacaine (NAROPIN) injection 3 mL, 3 mL, , , Maximiliano Bowden DO, 3 mL at 07/30/20 0830     triamcinolone (KENALOG-40) injection 40 mg, 40 mg, , , Maximiliano Bowden DO, 40 mg at 07/30/20 0830  Immunization History   Administered Date(s) Administered     COVID-19 Vaccine 18+ (Moderna) 04/05/2021, 05/03/2021, 01/31/2022     Pneumo Conj 13-V (2010&after) 08/17/2020     TDAP Vaccine (Adacel) 08/17/2020     Tdap (Adacel,Boostrix) 06/28/2007     Allergies   Allergen Reactions     Oxycodone Nausea and Vomiting     Aspirin Hives     Ibuprofen Hives     Nsaids Swelling     OBJECTIVE:                                                                 /87   Pulse 90   Ht 1.854 m (6' 1\")   Wt 147.4 kg (325 lb)   SpO2 " 95%   BMI 42.88 kg/m          Physical Exam  Vitals and nursing note reviewed.   Constitutional:       General: He is not in acute distress.     Appearance: He is obese. He is not diaphoretic.   HENT:      Head: Normocephalic and atraumatic.      Right Ear: Tympanic membrane, ear canal and external ear normal.      Left Ear: Tympanic membrane, ear canal and external ear normal.      Nose: No mucosal edema or rhinorrhea.      Right Turbinates: Not enlarged, swollen or pale.      Left Turbinates: Not enlarged, swollen or pale.      Mouth/Throat:      Lips: Pink.      Mouth: Mucous membranes are moist.      Pharynx: Oropharynx is clear. No pharyngeal swelling, oropharyngeal exudate or posterior oropharyngeal erythema.   Eyes:      General:         Right eye: No discharge.         Left eye: No discharge.      Conjunctiva/sclera: Conjunctivae normal.   Pulmonary:      Effort: Pulmonary effort is normal. No respiratory distress.      Breath sounds: Normal breath sounds and air entry. No stridor, decreased air movement or transmitted upper airway sounds. No decreased breath sounds, wheezing, rhonchi or rales.   Musculoskeletal:         General: Normal range of motion.   Skin:     General: Skin is warm.   Neurological:      Mental Status: He is alert and oriented to person, place, and time.   Psychiatric:         Mood and Affect: Mood normal.         Behavior: Behavior normal.           ASSESSMENT/PLAN:    Chronic idiopathic urticaria  Well-controlled with hydroxyzine 50 mg by mouth once daily.  Develops hives if he stops hydroxyzine for 3 days.  - Continue as is.    - hydrOXYzine (ATARAX) 50 MG tablet  Dispense: 90 tablet; Refill: 3       Return in about 1 year (around 3/2/2024), or if symptoms worsen or fail to improve.    Thank you for allowing us to participate in the care of this patient. Please feel free to contact us if there are any questions or concerns about the patient.    Disclaimer: This note consists of  symbols derived from keyboarding, dictation and/or voice recognition software. As a result, there may be errors in the script that have gone undetected. Please consider this when interpreting information found in this chart.    Corey Welsh MD, FAAAAI, FACAAI  Allergy, Asthma and Immunology     [facility]        Again, thank you for allowing me to participate in the care of your patient.        Sincerely,        Corey Weslh MD

## 2023-03-02 NOTE — PROGRESS NOTES
SUBJECTIVE:                                                                   Lupillo Smith presents today to our Allergy Clinic at [facility] for a follow up visit. He is a 57 year old male with a history of chronic idiopathic urticaria.  History of hives that started when he was a teenager. Previously managed by Dr. Garth Lilly, [facility] allergist.    For the first several years, he was on different oral antihistamines which he failed, and ultimately switched to hydroxyzine.  Depending on symptom control, he would be taking hydroxyzine from once a day to several times a day.  Until his mid-30s, he had more issues even when he was taking hydroxyzine several times a day consistently. Things got better with age.   He takes hydroxyzine 50 mg by mouth once daily, 5 days a week. He may wake up with hives if he misses hydroxyzine for 3 days. It happened on several occasions within the past year.  As long as he is consistent with hydroxyzine, he is doing well. He has some seasonal allergies in the Spring and Fall, but as long as he takes hydroxyzine, symptoms are well controlled.      Patient Active Problem List   Diagnosis     Prediabetes     Hypothyroidism     Morbid obesity (H)     Urticaria     Hyperlipidemia     Hx of papillary thyroid carcinoma     History of total thyroidectomy     Primary osteoarthritis of right hip     Benign essential hypertension     S/P total hip arthroplasty     Diabetes mellitus (H)       Past Medical History:   Diagnosis Date     Arthritis Hips, currently the right hip     Benign essential hypertension 9/1/2020     Cancer (H) Small amount of cancer detected when i had thyroid     Diabetes mellitus (H) 9/4/2020     Hx of thyroid cancer 2013     Thyroid disease Thyroidectomy July 2013     Urticaria       Problem (# of Occurrences) Relation (Name,Age of Onset)    Diabetes (1) Maternal Grandmother (Zaynab)    Hypertension (1) Father (Uvaldo)       Negative  family history of: Glaucoma, Macular Degeneration        Past Surgical History:   Procedure Laterality Date     ARTHROPLASTY HIP Right 9/3/2020    Procedure: ARTHROPLASTY, HIP, TOTAL;  Surgeon: Giuliano Desouza MD;  Location: [facility] OR     BIOPSY  Multiple    Pre-thyroid removal     JOINT REPLACEMENT Left 10/2013    Left hip replacement     ROTATOR CUFF REPAIR RT/LT Right     9/2002- Right shoulder     THYROIDECTOMY  2013     ZZC ANESTH,DX ARTHROSCOPIC PROC KNEE JOINT Right 04/1985     Social History     Socioeconomic History     Marital status:      Spouse name: None     Number of children: None     Years of education: None     Highest education level: None   Occupational History     Employer: [employer]   Tobacco Use     Smoking status: Some Days     Types: Cigars     Smokeless tobacco: Never     Tobacco comments:     One cigar every three months- very very rare   Vaping Use     Vaping Use: Never used   Substance and Sexual Activity     Alcohol use: Yes     Comment: rare     Drug use: No     Sexual activity: Yes     Partners: Female     Birth control/protection: None   Social History Narrative    February 23, 2021    ENVIRONMENTAL HISTORY: [place] The home is heated with a forced air, wood stove and gas fireplace. They do have central air conditioning. The patient's bedroom is furnished with carpeting in bedroom and fabric window coverings.  Pets inside the house include 2 dogs. There is no history of cockroach or mice infestation. There are no smokers in the house.  The house does not have a damp basement.            Review of Systems   Constitutional: Negative for activity change, fatigue and fever.   HENT: Negative for congestion, ear pain, facial swelling, nosebleeds, postnasal drip, rhinorrhea, sinus pressure and sneezing.    Eyes: Negative for discharge, redness and itching.   Respiratory: Negative for cough, chest tightness,  "shortness of breath and wheezing.    Gastrointestinal: Negative for diarrhea, nausea and vomiting.   Skin: Negative for rash.   Allergic/Immunologic: Negative for environmental allergies.   Neurological: Negative for headaches.   Psychiatric/Behavioral: Negative for self-injury.           Current Outpatient Medications:      Blood Pressure Monitoring (ADULT BLOOD PRESSURE CUFF LG) KIT, 1 each every other day, Disp: 1 kit, Rfl: 0     hydrOXYzine (ATARAX) 50 MG tablet, Take 1 tablet (50 mg) by mouth every 8 hours as needed for itching, Disp: 90 tablet, Rfl: 3     levothyroxine (SYNTHROID/LEVOTHROID) 137 MCG tablet, Take 2 tablets (274 mcg) by mouth daily, Disp: 180 tablet, Rfl: 2     metFORMIN (GLUCOPHAGE-XR) 750 MG 24 hr tablet, Take 2 tablets (1,500 mg) by mouth daily, Disp: 180 tablet, Rfl: 3     Tirzepatide (MOUNJARO) 7.5 MG/0.5ML pen, Inject 7.5 mg Subcutaneous every 7 days, Disp: 2 mL, Rfl: 0    Current Facility-Administered Medications:      ropivacaine (NAROPIN) injection 3 mL, 3 mL, , , Maximiliano Bowden DO, 3 mL at 07/30/20 0830     triamcinolone (KENALOG-40) injection 40 mg, 40 mg, , , Maximiliano Bowden DO, 40 mg at 07/30/20 0830  Immunization History   Administered Date(s) Administered     COVID-19 Vaccine 18+ (Moderna) 04/05/2021, 05/03/2021, 01/31/2022     Pneumo Conj 13-V (2010&after) 08/17/2020     TDAP Vaccine (Adacel) 08/17/2020     Tdap (Adacel,Boostrix) 06/28/2007     Allergies   Allergen Reactions     Oxycodone Nausea and Vomiting     Aspirin Hives     Ibuprofen Hives     Nsaids Swelling     OBJECTIVE:                                                                 /87   Pulse 90   Ht 1.854 m (6' 1\")   Wt 147.4 kg (325 lb)   SpO2 95%   BMI 42.88 kg/m          Physical Exam  Vitals and nursing note reviewed.   Constitutional:       General: He is not in acute distress.     Appearance: He is obese. He is not diaphoretic.   HENT:      Head: Normocephalic and atraumatic.      " Right Ear: Tympanic membrane, ear canal and external ear normal.      Left Ear: Tympanic membrane, ear canal and external ear normal.      Nose: No mucosal edema or rhinorrhea.      Right Turbinates: Not enlarged, swollen or pale.      Left Turbinates: Not enlarged, swollen or pale.      Mouth/Throat:      Lips: Pink.      Mouth: Mucous membranes are moist.      Pharynx: Oropharynx is clear. No pharyngeal swelling, oropharyngeal exudate or posterior oropharyngeal erythema.   Eyes:      General:         Right eye: No discharge.         Left eye: No discharge.      Conjunctiva/sclera: Conjunctivae normal.   Pulmonary:      Effort: Pulmonary effort is normal. No respiratory distress.      Breath sounds: Normal breath sounds and air entry. No stridor, decreased air movement or transmitted upper airway sounds. No decreased breath sounds, wheezing, rhonchi or rales.   Musculoskeletal:         General: Normal range of motion.   Skin:     General: Skin is warm.   Neurological:      Mental Status: He is alert and oriented to person, place, and time.   Psychiatric:         Mood and Affect: Mood normal.         Behavior: Behavior normal.           ASSESSMENT/PLAN:    Chronic idiopathic urticaria  Well-controlled with hydroxyzine 50 mg by mouth once daily.  Develops hives if he stops hydroxyzine for 3 days.  - Continue as is.    - hydrOXYzine (ATARAX) 50 MG tablet  Dispense: 90 tablet; Refill: 3       Return in about 1 year (around 3/2/2024), or if symptoms worsen or fail to improve.    Thank you for allowing us to participate in the care of this patient. Please feel free to contact us if there are any questions or concerns about the patient.    Disclaimer: This note consists of symbols derived from keyboarding, dictation and/or voice recognition software. As a result, there may be errors in the script that have gone undetected. Please consider this when interpreting information found in this chart.    Corey Welsh MD,  [facility]  Allergy, Asthma and Immunology     [facility]

## 2023-03-02 NOTE — PATIENT INSTRUCTIONS
Continue with hydroxyzine as needed.             Dr Welsh Scheduling:  [facility] (Tues / Wed) appointment line: [phone]  [place] allergy shot room: [phone]  [facility] (Thurs / Fri) appointment line: [phone]    Pulmonary Function Scheduling:  [place] - [phone]  [facility] [phone]  [place] - [phone]     Prescription Assistance  If you need assistance with your prescriptions (cost, coverage, etc) please contact: [facility] [phone]

## 2023-03-10 DIAGNOSIS — E03.9 HYPOTHYROIDISM, UNSPECIFIED TYPE: ICD-10-CM

## 2023-03-10 RX ORDER — LEVOTHYROXINE SODIUM 137 UG/1
274 TABLET ORAL DAILY
Qty: 180 TABLET | Refills: 0 | Status: SHIPPED | OUTPATIENT
Start: 2023-03-10 | End: 2023-08-01

## 2023-03-10 NOTE — TELEPHONE ENCOUNTER
"Requested Prescriptions   Pending Prescriptions Disp Refills     levothyroxine (SYNTHROID/LEVOTHROID) 137 MCG tablet [Pharmacy Med Name: Levothyroxine Sodium Oral Tablet 137 MCG] 180 tablet 0     Sig: Take 2 tablets (274 mcg) by mouth daily       Thyroid Protocol Failed - 3/10/2023  2:00 AM        Failed - Recent (12 mo) or future (30 days) visit within the authorizing provider's specialty     Patient has had an office visit with the authorizing provider or a provider within the authorizing providers department within the previous 12 mos or has a future within next 30 days. See \"Patient Info\" tab in inbasket, or \"Choose Columns\" in Meds & Orders section of the refill encounter.              Passed - Patient is 12 years or older        Passed - Medication is active on med list        Passed - Normal TSH on file in past 12 months     Recent Labs   Lab Test 12/20/22  1606   TSH 0.75                   "

## 2023-04-10 DIAGNOSIS — E11.9 TYPE 2 DIABETES MELLITUS WITHOUT COMPLICATION, WITHOUT LONG-TERM CURRENT USE OF INSULIN (H): Primary | ICD-10-CM

## 2023-04-10 DIAGNOSIS — E89.0 POSTSURGICAL HYPOTHYROIDISM: ICD-10-CM

## 2023-04-23 ENCOUNTER — HEALTH MAINTENANCE LETTER (OUTPATIENT)
Age: 58
End: 2023-04-23

## 2023-05-06 NOTE — PROGRESS NOTES
Outpatient Sleep Medicine Consultation:      Name: Lupillo Smith MRN# [id number]   Age: 57 year old YOB: 1965     Date of Consultation: May 6, 2023  Consultation is requested by: Charan Hodgson MD  [facility] [address] Charan Hodgson  Primary care provider: No Ref-Primary, Physician       Reason for Sleep Consult:     Lupillo Smith is sent by Charan Hodgson for a sleep consultation regarding suspected sleep apnea    Patient s Reason for visit  Lupillo Smith main reason for visit: Recommended sure to size, plus i do wake at night often  Patient states problem(s) started: Long time  Lupillozhang Smith's goals for this visit: Find it if there are any options or if it's even an issue           Assessment and Plan:     Summary Sleep Diagnoses:  Sleep-related breathing disorder  Witnessed episodes of nocturnal apnea  Insomnia, sleep maintenance  Multiple nocturnal awakenings    Comorbid Diagnoses:  Obesity, class III  Diabetes mellitus type 2  Hypothyroidism  Hyperlipidemia  Hypertension  Osteoarthritis      Summary Recommendations:  Orders Placed This Encounter   Procedures     HST-Home Sleep Apnea Test - Noxturnal Returnable   1.  Recommend patient undergo sleep study.  Discussed benefits and limitations of both polysomnographic sleep study as well as home sleep test.  Patient chooses to have a home sleep test.  2.  Recommend patient return to clinic approximately 2 weeks after sleep study has been completed to discuss his results and refine his plan of care going forward.  3.  Recommend patient review his sleep hygiene practices for methods of improvement that he may employ to mitigate any sleep disturbances.  4.  Recommend weight management program.  Obtain BMI of 30 or less.  5.  Recommend patient employ safe driving practices including not driving a motor vehicle if he is drowsy.      Summary Counselin.  Discussed pathophysiology of central  "sleep apnea as well as that of obstructive sleep apnea   2.  Discussed sequelae of untreated sleep apnea in the context of his medical diagnoses.  3.  Discussed all treatment measures of sleep apnea including PAP therapy, mandibular advancement device, surgical options.  4.  Discussed process of obtaining home sleep test  5.  Discussed positive sleep hygiene practices that he may employ to mitigate any sleep disturbances.    Medical Decision-making:   Educational materials provided in instructions    Total time spent reviewing medical records, history and physical examination, review of previous testing and interpretation as well as documentation on this date: 60 minutes    CC: Charan Hodgson          History of Present Illness:     Tristin Smith is a very pleasant 57-year-old male who presents to the sleep medicine clinic upon the advice of his medical provider, Charan Hodgson.  Patient is concerned about his nonrestorative sleep, concerns of possible obstructive sleep apnea and hopes of obtaining a sleep study.    Stanton has a past medical history notable for class III obesity, diabetes mellitus type 2, surgical removal of thyroid status post thyroid cancer (2013), hyperlipidemia, hypertension, osteoarthritis.    Patient complains of multiple nocturnal awakenings for elimination purposes then has difficulty returning to sleep multiple times per week.  At times he is completely unable to return to sleep and will \"just lay there.\"  He does have opportunities of excessive daytime sleepiness and unintentional napping.  He states he does feel better after a nap follow.  Other symptoms of sleep disordered breathing include morning mouth dryness, nasal congestion.  Stanton feels that he is a heavy breather.    Stanton has been working with endocrinology services to reduce his weight as well as to manage his blood sugar.  His hemoglobin A1c is on a downward trajectory as is his weight.    Plans for Stanton include " home sleep apnea test and a follow-up appointment approximately 2 weeks after his test to discuss his results and determine next steps in his plan of care.      Past Sleep Evaluations: None    SLEEP-WAKE SCHEDULE:     Work/School Days: Patient goes to school/work: Yes   Usually gets into bed at 1030pm  Takes patient about 15-20 minutes to fall asleep  Has trouble falling asleep Rare nights per week  Wakes up in the middle of the night 2 times on average times.  Wakes up due to Use the bathroom;Other  He has trouble falling back asleep One or two times times a week.   It usually takes Anywhere from 20 nights to 2 hours to get back to sleep  Patient is usually up at 730  Uses alarm: No    Weekends/Non-work Days/All Other Days:  Usually gets into bed at Varies   Takes patient about Not often to fall asleep  Patient is usually up at 9 am whit  Uses alarm: No    Sleep Need  Patient gets  8 hours a night sleep on average   Patient thinks he needs about Around that sleep    Lupillo Smith prefers to sleep in this position(s): Side;Recliner   Patient states they do the following activities in bed: Watch TV    Naps  Patient takes a purposeful nap Once a week, maybe less times a week and naps are usually Half hour in duration  He feels better after a nap: Yes  He dozes off unintentionally In the evening, often days per week  Patient has had a driving accident or near-miss due to sleepiness/drowsiness: No      SLEEP DISRUPTIONS:    Breathing/Snoring  Patient snores:No  Other people complain about his snoring: No  Patient has been told he stops breathing in his sleep:No  He has issues with the following: Morning mouth dryness;Stuffy nose when you wake up;Getting up to urinate more than once    Movement:  Patient gets pain, discomfort, with an urge to move:  Yes  It happens when he is resting:  Yes  It happens more at night:  Yes  Patient has been told he kicks his legs at night:  Yes     Behaviors in Sleep:  Lupillo RUBALCAVA  Luis has experienced the following behaviors while sleeping:    He has experienced sudden muscle weakness during the day: No      Is there anything else you would like your sleep provider to know:          CAFFEINE AND OTHER SUBSTANCES:    Patient consumes caffeinated beverages per day:  Zero  Last caffeine use is usually: None, i rarely drink caffeine  List of any prescribed or over the counter stimulants that patient takes: See list  List of any prescribed or over the counter sleep medication patient takes: None  List of previous sleep medications that patient has tried:    Patient drinks alcohol to help them sleep: No  Patient drinks alcohol near bedtime: No    Family History:  Patient has a family member been diagnosed with a sleep disorder: No            SCALES:    EPWORTH SLEEPINESS SCALE         5/7/2023     9:33 PM    [place] Sleepiness Scale ( GRETA Glover  1990-1997[facility] [place]/English - Final version - 21 Nov 07 - [facility] [place].)   Sitting and reading Slight chance of dozing   Watching TV Moderate chance of dozing   Sitting, inactive in a public place (e.g. a theatre or a meeting) Would never doze   As a passenger in a car for an hour without a break Slight chance of dozing   Lying down to rest in the afternoon when circumstances permit High chance of dozing   Sitting and talking to someone Would never doze   Sitting quietly after a lunch without alcohol Would never doze   In a car, while stopped for a few minutes in traffic Would never doze   [place] Score (MC) 7   [place] Score (Sleep) 7         INSOMNIA SEVERITY INDEX (KAREL)          5/7/2023     2:52 PM   Insomnia Severity Index (KAREL)   Difficulty falling asleep 0   Difficulty staying asleep 2   Problems waking up too early 1   How SATISFIED/DISSATISFIED are you with your CURRENT sleep pattern? 2   How NOTICEABLE to others do you think your sleep problem is in terms of impairing the quality of your life? 3   How WORRIED/DISTRESSED are you about  "your current sleep problem? 0   To what extent do you consider your sleep problem to INTERFERE with your daily functioning (e.g. daytime fatigue, mood, ability to function at work/daily chores, concentration, memory, mood, etc.) CURRENTLY? 1   KAREL Total Score 9       Guidelines for Scoring/Interpretation:  Total score categories:  0-7 = No clinically significant insomnia   8-14 = Subthreshold insomnia   15-21 = Clinical insomnia (moderate severity)  22-28 = Clinical insomnia (severe)  Used via courtesy of www.[employer].va.gov with permission from Logan Bradley PhD., [facility]      STOP BANG         5/7/2023     9:34 PM   STOP BANG Questionnaire (  2008, the American Society of Anesthesiologists, Inc. [employer]   1. Snoring - Do you snore loudly (louder than talking or loud enough to be heard through closed doors)? No   2. Tired - Do you often feel tired, fatigued, or sleepy during daytime? No   3. Observed - Has anyone observed you stop breathing during your sleep? No   4. Blood pressure - Do you have or are you being treated for high blood pressure? No   5. BMI - BMI more than 35 kg/m2? Yes   6. Age - Age over 50 yr old? Yes   7. Neck circumference - Neck circumference greater than 40 cm? Yes   8. Gender - Gender male? Yes   STOP BANG Score (MC): 3 (High risk of GLENN)         GAD7         View : No data to display.                  CAGE-AID         View : No data to display.                CAGE-AID reprinted with permission from the [place] Medical Journal, RY Zuleta. and CATHERINE Champion, \"Conjoint screening questionnaires for alcohol and drug abuse\" [place] Medical Journal 94: 135-140, 1995.      PATIENT HEALTH QUESTIONNAIRE-9 (PHQ - 9)         View : No data to display.                Developed by Macie Apodaca, Melina Alvarado, Isaiah Gaitan and colleagues, with an educational ld from [employer] No permission required to reproduce, translate, display or " distribute.        Allergies:    Allergies   Allergen Reactions     Oxycodone Nausea and Vomiting     Aspirin Hives     Ibuprofen Hives     Nsaids Swelling       Medications:    Current Outpatient Medications   Medication Sig Dispense Refill     hydrOXYzine (ATARAX) 50 MG tablet Take 1 tablet (50 mg) by mouth daily as needed for itching (hives) 90 tablet 3     levothyroxine (SYNTHROID/LEVOTHROID) 137 MCG tablet Take 2 tablets (274 mcg) by mouth daily FURTHER REFILL PER ENDOCRINOLOGY OR PCP. 180 tablet 0     metFORMIN (GLUCOPHAGE-XR) 750 MG 24 hr tablet Take 2 tablets (1,500 mg) by mouth daily 180 tablet 3     tirzepatide (MOUNJARO) 12.5 MG/0.5ML pen Inject 12.5 mg Subcutaneous every 7 days 12.5 mg weekly for 4 weeks then increase to 15 mg weekly 2 mL 0     Blood Pressure Monitoring (ADULT BLOOD PRESSURE CUFF LG) KIT 1 each every other day (Patient not taking: Reported on 5/8/2023) 1 kit 0     tirzepatide (MOUNJARO) 10 MG/0.5ML pen Inject 10 mg Subcutaneous every 7 days (Patient not taking: Reported on 5/8/2023) 2 mL 0     tirzepatide (MOUNJARO) 15 MG/0.5ML pen Inject 15 mg Subcutaneous every 7 days (Patient not taking: Reported on 5/8/2023) 6 mL 3     Tirzepatide (MOUNJARO) 7.5 MG/0.5ML pen Inject 7.5 mg Subcutaneous every 7 days (Patient not taking: Reported on 5/8/2023) 2 mL 0       Problem List:  Patient Active Problem List    Diagnosis Date Noted     Diabetes mellitus (H) 09/04/2020     Priority: Medium     S/P total hip arthroplasty 09/03/2020     Priority: Medium     Benign essential hypertension 09/01/2020     Priority: Medium     Primary osteoarthritis of right hip 06/17/2020     Priority: Medium     Morbid obesity (H) 06/04/2019     Priority: Medium     Hypothyroidism 11/06/2018     Priority: Medium     Prediabetes 08/29/2018     Priority: Medium     Hx of papillary thyroid carcinoma 05/02/2016     Priority: Medium     History of total thyroidectomy 09/17/2013     Priority: Medium     Hyperlipidemia  06/27/2007     Priority: Medium     Urticaria 05/22/2007     Priority: Medium     Overview:   Urticaria Chronic          Past Medical/Surgical History:  Past Medical History:   Diagnosis Date     Arthritis Hips, currently the right hip     Benign essential hypertension 9/1/2020     Cancer (H) Small amount of cancer detected when i had thyroid     Diabetes mellitus (H) 9/4/2020     Hx of thyroid cancer 2013     Thyroid disease Thyroidectomy July 2013     Urticaria      Past Surgical History:   Procedure Laterality Date     ARTHROPLASTY HIP Right 9/3/2020    Procedure: ARTHROPLASTY, HIP, TOTAL;  Surgeon: Giuliano Desouza MD;  Location: [facility] OR     BIOPSY  Multiple    Pre-thyroid removal     JOINT REPLACEMENT Left 10/2013    Left hip replacement     ROTATOR CUFF REPAIR RT/LT Right     9/2002- Right shoulder     THYROIDECTOMY  2013     ZZC ANESTH,DX ARTHROSCOPIC PROC KNEE JOINT Right 04/1985       Social History:  Social History     Socioeconomic History     Marital status:      Spouse name: Not on file     Number of children: Not on file     Years of education: Not on file     Highest education level: Not on file   Occupational History     Employer: [employer]   Tobacco Use     Smoking status: Some Days     Types: Cigars     Smokeless tobacco: Never     Tobacco comments:     One cigar every three months- very very rare   Vaping Use     Vaping status: Never Used   Substance and Sexual Activity     Alcohol use: Yes     Comment: rare     Drug use: No     Sexual activity: Yes     Partners: Female     Birth control/protection: None   Other Topics Concern     Not on file   Social History Narrative    February 23, 2021    ENVIRONMENTAL HISTORY: [place] The home is heated with a forced air, wood stove and gas fireplace. They do have central air conditioning. The patient's bedroom is furnished with carpeting in bedroom and fabric window coverings.   "Pets inside the house include 2 dogs. There is no history of cockroach or mice infestation. There are no smokers in the house.  The house does not have a damp basement.      Social Determinants of Health     Financial Resource Strain: Not on file   Food Insecurity: Not on file   Transportation Needs: Not on file   Physical Activity: Not on file   Stress: Not on file   Social Connections: Not on file   Intimate Partner Violence: Not on file   Housing Stability: Not on file       Family History:  Family History   Problem Relation Age of Onset     Hypertension Father      Diabetes Maternal Grandmother      Glaucoma No family hx of      Macular Degeneration No family hx of        Review of Systems:  A complete review of systems reviewed by me is negative with the exeption of what has been mentioned in the history of present illness.      Physical Examination:  Vitals: /80   Pulse 94   Ht 1.854 m (6' 1\")   Wt 143 kg (315 lb 4 oz)   SpO2 98%   BMI 41.59 kg/m    BMI= Body mass index is 41.59 kg/m .       Physical Exam  Vitals and nursing note reviewed.   Constitutional:       General: He is not in acute distress.     Appearance: Normal appearance. He is obese. He is not ill-appearing or toxic-appearing.   HENT:      Head: Normocephalic and atraumatic.      Right Ear: External ear normal.      Left Ear: External ear normal.      Nose: Nose normal.      Mouth/Throat:      Mouth: Mucous membranes are moist.      Pharynx: Oropharynx is clear.   Eyes:      Conjunctiva/sclera: Conjunctivae normal.   Cardiovascular:      Rate and Rhythm: Normal rate and regular rhythm.      Pulses: Normal pulses.   Pulmonary:      Effort: Pulmonary effort is normal.      Breath sounds: Normal breath sounds.   Musculoskeletal:         General: Normal range of motion.      Cervical back: Normal range of motion and neck supple.   Skin:     General: Skin is warm and dry.      Capillary Refill: Capillary refill takes less than 2 seconds. "   Neurological:      General: No focal deficit present.      Mental Status: He is alert and oriented to person, place, and time.   Psychiatric:         Mood and Affect: Mood normal.         Behavior: Behavior normal.         Thought Content: Thought content normal.         Judgment: Judgment normal.             Mallampati Class: III.  Tonsillar Stage: 1  hidden by pillars.         Data: All pertinent previous laboratory data reviewed     Recent Labs   Lab Test 12/20/22  1606 03/10/22  0924 09/04/20  0535   NA  --  139 136   POTASSIUM  --  4.2 4.4   CHLORIDE  --  106 102   CO2  --  32 30   ANIONGAP  --  1* 4   * 107* 174*   BUN  --  17 14   CR 0.92 0.79 0.83   ASHLEE  --  9.2 8.4*       Recent Labs   Lab Test 09/04/20  0535 08/25/20  1142   WBC  --  7.6   RBC  --  4.88   HGB 13.9 14.1   HCT  --  43.5   MCV  --  89   MCH  --  28.9   MCHC  --  32.4   RDW  --  13.4   PLT  --  252       Recent Labs   Lab Test 08/29/18  1745   PROTTOTAL 7.8   ALBUMIN 4.0   BILITOTAL 0.5   ALKPHOS 78   AST 15   ALT 26       TSH   Date Value   12/20/2022 0.75 uIU/mL   03/10/2022 0.16 mU/L (L)   03/02/2021 0.04 mU/L (L)   12/30/2019 0.61 mU/L       No results found for: UAMP, UBARB, BENZODIAZEUR, UCANN, UCOC, OPIT, UPCP    No results found for: IRONSAT, [id number], CRYSTAL    No results found for: PH, PHARTERIAL, PO2, [id number], SAT, PCO2, HCO3, BASEEXCESS, LIVIER, BEB    @LABRCNTIPR(phv:4,pco2v:4,po2v:4,hco3v:4,parth:4,o2per:4)@    Echocardiology: No results found for this or any previous visit (from the past 4320 hour(s)).    Chest x-ray: No results found for this or any previous visit from the past 365 days.      Chest CT: No results found for this or any previous visit from the past 365 days.      PFT: Most Recent [facility] Pulmonary Function Testing    No results found for: 20001  No results found for: 20002  No results found for: 20003  No results found for: 20015  No results found for: 20016  No results found for: 20027  No results found for:  20028  No results found for: 20029  No results found for: 20079  No results found for: 20080  No results found for: 20081  No results found for: 20335  No results found for: 20105  No results found for: 20053  No results found for: 20054  No results found for: 20055      CRYSTAL Olivia CNP 5/6/2023   Sleep Medicine    This note was written with the assistance of the Dragon voice-dictation technology software. The final document, although reviewed, may contain errors. For corrections, please contact the office.

## 2023-05-07 ASSESSMENT — SLEEP AND FATIGUE QUESTIONNAIRES
HOW LIKELY ARE YOU TO NOD OFF OR FALL ASLEEP WHILE LYING DOWN TO REST IN THE AFTERNOON WHEN CIRCUMSTANCES PERMIT: HIGH CHANCE OF DOZING
HOW LIKELY ARE YOU TO NOD OFF OR FALL ASLEEP WHEN YOU ARE A PASSENGER IN A CAR FOR AN HOUR WITHOUT A BREAK: SLIGHT CHANCE OF DOZING
HOW LIKELY ARE YOU TO NOD OFF OR FALL ASLEEP WHILE SITTING INACTIVE IN A PUBLIC PLACE: WOULD NEVER DOZE
HOW LIKELY ARE YOU TO NOD OFF OR FALL ASLEEP WHILE SITTING QUIETLY AFTER LUNCH WITHOUT ALCOHOL: WOULD NEVER DOZE
HOW LIKELY ARE YOU TO NOD OFF OR FALL ASLEEP IN A CAR, WHILE STOPPED FOR A FEW MINUTES IN TRAFFIC: WOULD NEVER DOZE
HOW LIKELY ARE YOU TO NOD OFF OR FALL ASLEEP WHILE WATCHING TV: MODERATE CHANCE OF DOZING
HOW LIKELY ARE YOU TO NOD OFF OR FALL ASLEEP WHILE SITTING AND TALKING TO SOMEONE: WOULD NEVER DOZE
HOW LIKELY ARE YOU TO NOD OFF OR FALL ASLEEP WHILE SITTING AND READING: SLIGHT CHANCE OF DOZING

## 2023-05-08 ENCOUNTER — OFFICE VISIT (OUTPATIENT)
Dept: SLEEP MEDICINE | Facility: CLINIC | Age: 58
End: 2023-05-08
Attending: INTERNAL MEDICINE
Payer: COMMERCIAL

## 2023-05-08 VITALS
OXYGEN SATURATION: 98 % | SYSTOLIC BLOOD PRESSURE: 128 MMHG | WEIGHT: 315 LBS | DIASTOLIC BLOOD PRESSURE: 80 MMHG | HEIGHT: 73 IN | BODY MASS INDEX: 41.75 KG/M2 | HEART RATE: 94 BPM

## 2023-05-08 DIAGNOSIS — R06.81 APNEA: ICD-10-CM

## 2023-05-08 DIAGNOSIS — E11.9 TYPE 2 DIABETES MELLITUS WITHOUT COMPLICATION, WITHOUT LONG-TERM CURRENT USE OF INSULIN (H): ICD-10-CM

## 2023-05-08 DIAGNOSIS — E66.813 CLASS 3 SEVERE OBESITY DUE TO EXCESS CALORIES WITHOUT SERIOUS COMORBIDITY WITH BODY MASS INDEX (BMI) OF 40.0 TO 44.9 IN ADULT (H): ICD-10-CM

## 2023-05-08 DIAGNOSIS — G47.30 SLEEP-RELATED BREATHING DISORDER: Primary | ICD-10-CM

## 2023-05-08 DIAGNOSIS — Z85.850 HX OF PAPILLARY THYROID CARCINOMA: ICD-10-CM

## 2023-05-08 DIAGNOSIS — E89.0 HISTORY OF TOTAL THYROIDECTOMY: ICD-10-CM

## 2023-05-08 DIAGNOSIS — E66.01 CLASS 3 SEVERE OBESITY DUE TO EXCESS CALORIES WITHOUT SERIOUS COMORBIDITY WITH BODY MASS INDEX (BMI) OF 40.0 TO 44.9 IN ADULT (H): ICD-10-CM

## 2023-05-08 PROCEDURE — 99417 PROLNG OP E/M EACH 15 MIN: CPT | Performed by: NURSE PRACTITIONER

## 2023-05-08 PROCEDURE — 99215 OFFICE O/P EST HI 40 MIN: CPT | Performed by: NURSE PRACTITIONER

## 2023-05-08 NOTE — NURSING NOTE
"Chief Complaint   Patient presents with     Consult     Evaluated for sleep study due to high BMI       Initial /80   Pulse 94   Ht 1.854 m (6' 1\")   Wt 143 kg (315 lb 4 oz)   SpO2 98%   BMI 41.59 kg/m   Estimated body mass index is 41.59 kg/m  as calculated from the following:    Height as of this encounter: 1.854 m (6' 1\").    Weight as of this encounter: 143 kg (315 lb 4 oz).    Medication Reconciliation: complete    Neck circumference: 19 inches / 48 centimeters.    DME: n/a    KHUSHBU Murphy  [facility]      "

## 2023-05-08 NOTE — NURSING NOTE
Home sleep test and return visit has been scheduled. HST instruction hand out given to patient. Patient declined AVS.     Jayy Mancuso [facility] [place]

## 2023-05-24 ENCOUNTER — LAB (OUTPATIENT)
Dept: LAB | Facility: CLINIC | Age: 58
End: 2023-05-24
Payer: COMMERCIAL

## 2023-05-24 DIAGNOSIS — E89.0 POSTSURGICAL HYPOTHYROIDISM: ICD-10-CM

## 2023-05-24 DIAGNOSIS — E11.9 TYPE 2 DIABETES MELLITUS WITHOUT COMPLICATION, WITHOUT LONG-TERM CURRENT USE OF INSULIN (H): ICD-10-CM

## 2023-05-24 LAB
CHOLEST SERPL-MCNC: 159 MG/DL
FASTING STATUS PATIENT QL REPORTED: NO
GLUCOSE SERPL-MCNC: 83 MG/DL (ref 70–99)
HBA1C MFR BLD: 5.4 % (ref 0–5.6)
HDLC SERPL-MCNC: 37 MG/DL
LDLC SERPL CALC-MCNC: 87 MG/DL
NONHDLC SERPL-MCNC: 122 MG/DL
T4 FREE SERPL-MCNC: 2.21 NG/DL (ref 0.9–1.7)
TRIGL SERPL-MCNC: 176 MG/DL
TSH SERPL DL<=0.005 MIU/L-ACNC: 0.03 UIU/ML (ref 0.3–4.2)

## 2023-05-24 PROCEDURE — 82947 ASSAY GLUCOSE BLOOD QUANT: CPT

## 2023-05-24 PROCEDURE — 36415 COLL VENOUS BLD VENIPUNCTURE: CPT

## 2023-05-24 PROCEDURE — 80061 LIPID PANEL: CPT

## 2023-05-24 PROCEDURE — 84443 ASSAY THYROID STIM HORMONE: CPT

## 2023-05-24 PROCEDURE — 83036 HEMOGLOBIN GLYCOSYLATED A1C: CPT

## 2023-05-24 PROCEDURE — 84439 ASSAY OF FREE THYROXINE: CPT

## 2023-06-02 NOTE — PATIENT INSTRUCTIONS
"Medicare and Medicaid patients starting on CPAP or biPAP on or after 5/12/2023  Medicare patients should schedule an IN PERSON CLINIC appointment to provide your CPAP/biPAP usage data to a provider within 90 days of starting CPAP  Virtual visits are no longer allowed for this visit for Medicare                MY TREATMENT INFORMATION FOR SLEEP APNEA-  Lupillo Smith    DOCTOR : CRYSTAL Olivia CNP    Am I having a sleep study at a sleep center?  --->Due to normal delays, you will be contacted within 2-4 weeks to schedule    Am I having a home sleep study?  --->Watch the video for the device you are using:    -/drop off device-   https://www.[employer].com/watch?v=yGGFBdELGhk    -Disposable device sent out require phone/computer application-   https://www.[employer].com/watch?v=BCce_vbiwxE      Frequently asked questions:  1. What is Obstructive Sleep Apnea (GLENN)? GLENN is the most common type of sleep apnea. Apnea means, \"without breath.\"  Apnea is most often caused by narrowing or collapse of the upper airway as muscles relax during sleep.   Almost everyone has occasional apneas. Most people with sleep apnea have had brief interruptions at night frequently for many years.  The severity of sleep apnea is related to how frequent and severe the events are.   2. What are the consequences of GLENN? Symptoms include: feeling sleepy during the day, snoring loudly, gasping or stopping of breathing, trouble sleeping, and occasionally morning headaches or heartburn at night.  Sleepiness can be serious and even increase the risk of falling asleep while driving. Other health consequences may include development of high blood pressure and other cardiovascular disease in persons who are susceptible. Untreated GLENN  can contribute to heart disease, stroke and diabetes.   3. What are the treatment options? In most situations, sleep apnea is a lifelong disease that must be managed with daily therapy. Medications are not " effective for sleep apnea and surgery is generally not considered until other therapies have been tried. Your treatment is your choice . Continuous Positive Airway (CPAP) works right away and is the therapy that is effective in nearly everyone. An oral device to hold your jaw forward is usually the next most reliable option. Other options include postioning devices (to keep you off your back), weight loss, and surgery including a tongue pacing device. There is more detail about some of these options below.  4. Are my sleep studies covered by insurance? Although we will request verification of coverage, we advise you also check in advance of the study to ensure there is coverage.    Important tips for those choosing CPAP and similar devices  For new devices, sign up for device KASH to monitor your device for your followup visits  We encourage you to utilize the [employer] kash or website (myAir web (resmed.com) ) to monitor your therapy progress and share the data with your healthcare team when you discuss your sleep apnea.                                                    Know your equipment:  CPAP is continuous positive airway pressure that prevents obstructive sleep apnea by keeping the throat from collapsing while you are sleeping. In most cases, the device is  smart  and can slowly self-adjusts if your throat collapses and keeps a record every day of how well you are treated-this information is available to you and your care team.  BPAP is bilevel positive airway pressure that keeps your throat open and also assists each breath with a pressure boost to maintain adequate breathing.  Special kinds of BPAP are used in patients who have inadequate breathing from lung or heart disease. In most cases, the device is  smart  and can slowly self-adjusts to assist breathing. Like CPAP, the device keeps a record of how well you are treated.  Your mask is your connection to the device. You get to choose what feels most  comfortable and the staff will help to make sure if fits. Here: are some examples of the different masks that are available:       Key points to remember on your journey with sleep apnea:  Sleep study.  PAP devices often need to be adjusted during a sleep study to show that they are effective and adjusted right.  Good tips to remember: Try wearing just the mask during a quiet time during the day so your body adapts to wearing it. A humidifier is recommended for comfort in most cases to prevent drying of your nose and throat. Allergy medication from your provider may help you if you are having nasal congestion.  Getting settled-in. It takes more than one night for most of us to get used to wearing a mask. Try wearing just the mask during a quiet time during the day so your body adapts to wearing it. A humidifier is recommended for comfort in most cases. Our team will work with you carefully on the first day and will be in contact within 4 days and again at 2 and 4 weeks for advice and remote device adjustments. Your therapy is evaluated by the device each day.   Use it every night. The more you are able to sleep naturally for 7-8 hours, the more likely you will have good sleep and to prevent health risks or symptoms from sleep apnea. Even if you use it 4 hours it helps. Occasionally all of us are unable to use a medical therapy, in sleep apnea, it is not dangerous to miss one night.   Communicate. Call our skilled team on the number provided on the first day if your visit for problems that make it difficult to wear the device. Over 2 out of 3 patients can learn to wear the device long-term with help from our team. Remember to call our team or your sleep providers if you are unable to wear the device as we may have other solutions for those who cannot adapt to mask CPAP therapy. It is recommended that you sleep your sleep provider within the first 3 months and yearly after that if you are not having problems.   Use it  for your health. We encourage use of CPAP masks during daytime quiet periods to allow your face and brain to adapt to the sensation of CPAP so that it will be a more natural sensation to awaken to at night or during naps. This can be very useful during the first few weeks or months of adapting to CPAP though it does not help medically to wear CPAP during wakefulness and  should not be used as a strategy just to meet guidelines.  Take care of your equipment. Make sure you clean your mask and tubing using directions every day and that your filter and mask are replaced as recommended or if they are not working.     BESIDES CPAP, WHAT OTHER THERAPIES ARE THERE?    Positioning Device  Positioning devices are generally used when sleep apnea is mild and only occurs on your back.This example shows a pillow that straps around the waist. It may be appropriate for those whose sleep study shows milder sleep apnea that occurs primarily when lying flat on one's back. Preliminary studies have shown benefit but effectiveness at home may need to be verified by a home sleep test. These devices are generally not covered by medical insurance.  Examples of devices that maintain sleeping on the back to prevent snoring and mild sleep apnea.    Belt type body positioner  http://[employer].Department of Health and Human Services/    Electronic reminder  http://nightshifttherapy.com/            Oral Appliance  What is oral appliance therapy?  An oral appliance device fits on your teeth at night like a retainer used after having braces. The device is made by a specialized dentist and requires several visits over 1-2 months before a manufactured device is made to fit your teeth and is adjusted to prevent your sleep apnea. Once an oral device is working properly, snoring should be improved. A home sleep test may be recommended at that time if to determine whether the sleep apnea is adequately treated.       Some things to remember:  -Oral devices are often, but not always, covered  by your medical insurance. Be sure to check with your insurance provider.   -If you are referred for oral therapy, you will be given a list of specialized dentists to consider or you may choose to visit the Web site of the American Academy of Dental Sleep Medicine  -Oral devices are less likely to work if you have severe sleep apnea or are extremely overweight.     More detailed information  An oral appliance is a small acrylic device that fits over the upper and lower teeth  (similar to a retainer or a mouth guard). This device slightly moves jaw forward, which moves the base of the tongue forward, opens the airway, improves breathing for effective treat snoring and obstructive sleep apnea in perhaps 7 out of 10 people .  The best working devices are custom-made by a dental device  after a mold is made of the teeth 1, 2, 3.  When is an oral appliance indicated?  Oral appliance therapy is recommended as a first-line treatment for patients with primary snoring, mild sleep apnea, and for patients with moderate sleep apnea who prefer appliance therapy to use of CPAP4, 5. Severity of sleep apnea is determined by sleep testing and is based on the number of respiratory events per hour of sleep.   How successful is oral appliance therapy?  The success rate of oral appliance therapy in patients with mild sleep apnea is 75-80% while in patients with moderate sleep apnea it is 50-70%. The chance of success in patients with severe sleep apnea is 40-50%. The research also shows that oral appliances have a beneficial effect on the cardiovascular health of GLENN patients at the same magnitude as CPAP therapy7.  Oral appliances should be a second-line treatment in cases of severe sleep apnea, but if not completely successful then a combination therapy utilizing CPAP plus oral appliance therapy may be effective. Oral appliances tend to be effective in a broad range of patients although studies show that the patients who  have the highest success are females, younger patients, those with milder disease, and less severe obesity. 3, 6.   Finding a dentist that practices dental sleep medicine  Specific training is available through the American Academy of Dental Sleep Medicine for dentists interested in working in the field of sleep. To find a dentist who is educated in the field of sleep and the use of oral appliances, near you, visit the Web site of the American Academy of Dental Sleep Medicine.    References  1. Ike, et al. Objectively measured vs self-reported compliance during oral appliance therapy for sleep-disordered breathing. Chest 2013; 144(5): 6944-7111.  2. Deshawn, et al. Objective measurement of compliance during oral appliance therapy for sleep-disordered breathing. Thorax 2013; 68(1): 91-96.  3. Stephen et al. Mandibular advancement devices in 620 men and women with GLENN and snoring: tolerability and predictors of treatment success. Chest 2004; 125: 0373-5221.  4. Oskar, et al. Oral appliances for snoring and GLENN: a review. Sleep 2006; 29: 244-262.  5. Ernesto et al. Oral appliance treatment for GLENN: an update. J Clin Sleep Med 2014; 10(2): 215-227.  6. Loli et al. Predictors of OSAH treatment outcome. J Dent Res 2007; 86: 9638-0858.      Weight Loss:    Weight loss is a long-term strategy that may improve sleep apnea in some patients.    Weight management is a personal decision and the decision should be based on your interest and the potential benefits.  If you are interested in exploring weight loss strategies, the following discussion covers the impact on weight loss on sleep apnea and the approaches that may be successful.    Being overweight does not necessarily mean you will have health consequences.  Those who have BMI over 35 or over 27 with existing medical conditions carries greater risk.   Weight loss decreases severity of sleep apnea in most people with obesity. For those with mild  obesity who have developed snoring with weight gain, even 15-30 pound weight loss can improve and occasionally eliminate sleep apnea.  Structured and life-long dietary and health habits are necessary to lose weight and keep healthier weight levels.     Though there may be significant health benefits from weight loss, long-term weight loss is very difficult to achieve- studies show success with dietary management in less than 10% of people. In addition, substantial weight loss may require years of dietary control and may be difficult if patients have severe obesity. In these cases, surgical management may be considered.  Finally, older individuals who have tolerated obesity without health complications may be less likely to benefit from weight loss strategies.      [unfilled]    Surgery:    Surgery for obstructive sleep apnea is considered generally only when other therapies fail to work. Surgery may be discussed with you if you are having a difficult time tolerating CPAP and or when there is an abnormal structure that requires surgical correction.  Nose and throat surgeries often enlarge the airway to prevent collapse.  Most of these surgeries create pain for 1-2 weeks and up to half of the most common surgeries are not effective throughout life.  You should carefully discuss the benefits and drawbacks to surgery with your sleep provider and surgeon to determine if it is the best solution for you.   More information  Surgery for GLENN is directed at areas that are responsible for narrowing or complete obstruction of the airway during sleep.  There are a wide range of procedures available to enlarge and/or stabilize the airway to prevent blockage of breathing in the three major areas where it can occur: the palate, tongue, and nasal regions.  Successful surgical treatment depends on the accurate identification of the factors responsible for obstructive sleep apnea in each person.  A personalized approach is required  because there is no single treatment that works well for everyone.  Because of anatomic variation, consultation with an examination by a sleep surgeon is a critical first step in determining what surgical options are best for each patient.  In some cases, examination during sedation may be recommended in order to guide the selection of procedures.  Patients will be counseled about risks and benefits as well as the typical recovery course after surgery. Surgery is typically not a cure for a person s GLENN.  However, surgery will often significantly improve one s GLENN severity (termed  success rate ).  Even in the absence of a cure, surgery will decrease the cardiovascular risk associated with OSA7; improve overall quality of life8 (sleepiness, functionality, sleep quality, etc).      Palate Procedures:  Patients with GLENN often have narrowing of their airway in the region of their tonsils and uvula.  The goals of palate procedures are to widen the airway in this region as well as to help the tissues resist collapse.  Modern palate procedure techniques focus on tissue conservation and soft tissue rearrangement, rather than tissue removal.  Often the uvula is preserved in this procedure. Residual sleep apnea is common in patient after pharyngoplasty with an average reduction in sleep apnea events of 33%2.      Tongue Procedures:  ExamWhile patients are awake, the muscles that surround the throat are active and keep this region open for breathing. These muscles relax during sleep, allowing the tongue and other structures to collapse and block breathing.  There are several different tongue procedures available.  Selection of a tongue base procedure depends on characteristics seen on physical exam.  Generally, procedures are aimed at removing bulky tissues in this area or preventing the back of the tongue from falling back during sleep.  Success rates for tongue surgery range from 50-62%3.    Hypoglossal Nerve  Stimulation:  Hypoglossal nerve stimulation has recently received approval from the [place] Food and Drug Administration for the treatment of obstructive sleep apnea.  This is based on research showing that the system was safe and effective in treating sleep apnea6.  Results showed that the median AHI score decreased 68%, from 29.3 to 9.0. This therapy uses an implant system that senses breathing patterns and delivers mild stimulation to airway muscles, which keeps the airway open during sleep.  The system consists of three fully implanted components: a small generator (similar in size to a pacemaker), a breathing sensor, and a stimulation lead.  Using a small handheld remote, a patient turns the therapy on before bed and off upon awakening.    Candidates for this device must be greater than 18 years of age, have moderate to severe GLENN (AHI between 15-65), BMI less than 35, have tried CPAP/oral appliance for at least 8 weeks without success, and have appropriate upper airway anatomy (determined by a sleep endoscopy performed by Dr. Jb Agrawal).    Hypoglossal Nerve Stimulation Pathway:    The sleep surgeon s office will work with the patient through the insurance prior-authorization process (including communications and appeals).    Nasal Procedures:  Nasal obstruction can interfere with nasal breathing during the day and night.  Studies have shown that relief of nasal obstruction can improve the ability of some patients to tolerate positive airway pressure therapy for obstructive sleep apnea1.  Treatment options include medications such as nasal saline, topical corticosteroid and antihistamine sprays, and oral medications such as antihistamines or decongestants. Non-surgical treatments can include external nasal dilators for selected patients. If these are not successful by themselves, surgery can improve the nasal airway either alone or in combination with these other options.      Combination  Procedures:  Combination of surgical procedures and other treatments may be recommended, particularly if patients have more than one area of narrowing or persistent positional disease.  The success rate of combination surgery ranges from 66-80%2,3.    References  Liseth JAMES. The Role of the Nose in Snoring and Obstructive Sleep Apnoea: An Update.  Eur Arch Otorhinolaryngol. 2011; 268: 1365-73.   Fazal SM; Navid JA; Myriam JR; Pallanch JF; Bryanna MB; Philly SG; Doug MACE. Surgical modifications of the upper airway for obstructive sleep apnea in adults: a systematic review and meta-analysis. SLEEP 2010;33(10):8941-7347. Nina OLIVARES. Hypopharyngeal surgery in obstructive sleep apnea: an evidence-based medicine review.  Arch Otolaryngol Head Neck Surg. 2006 Feb;132(2):206-13.  Celestine YH1, Melvin Y, Maciel SUPRIYA. The efficacy of anatomically based multilevel surgery for obstructive sleep apnea. Otolaryngol Head Neck Surg. 2003 Oct;129(4):327-35.  Kezirian E, Goldberg A. Hypopharyngeal Surgery in Obstructive Sleep Apnea: An Evidence-Based Medicine Review. Arch Otolaryngol Head Neck Surg. 2006 Feb;132(2):206-13.  Ysabel PJ et al. Upper-Airway Stimulation for Obstructive Sleep Apnea.  N Engl J Med. 2014 Jan 9;370(2):139-49.  Olivier Y et al. Increased Incidence of Cardiovascular Disease in Middle-aged Men with Obstructive Sleep Apnea. Am J Respir Crit Care Med; 2002 166: 159-165  Kendall EM et al. Studying Life Effects and Effectiveness of Palatopharyngoplasty (SLEEP) study: Subjective Outcomes of Isolated Uvulopalatopharyngoplasty. Otolaryngol Head Neck Surg. 2011; 144: 623-631.        WHAT IF I ONLY HAVE SNORING?    Mandibular advancement devices, lateral sleep positioning, long-term weight loss and treatment of nasal allergies have been shown to improve snoring.  Exercising tongue muscles with a game (https://apps.[employer]/us/kash/soundly-reduce-snoring/nl1253338402) or stimulating the tongue during the day with a device  (https://doi.org/10.3390/ino86795229) have improved snoring in some individuals.    Remember to Drive Safe... Drive Alive     Sleep health profoundly affects your health, mood, and your safety.  Thirty three percent of the population (one in three of us) is not getting enough sleep and many have a sleep disorder. Not getting enough sleep or having an untreated / undertreated sleep condition may make us sleepy without even knowing it. In fact, our driving could be dramatically impaired due to our sleep health. As your provider, here are some things I would like you to know about driving:     Here are some warning signs for impairment and dangerous drowsy driving:              -Having been awake more than 16 hours               -Looking tired               -Eyelid drooping              -Head nodding (it could be too late at this point)              -Driving for more than 30 minutes     Some things you could do to make the driving safer if you are experiencing some drowsiness:              -Stop driving and rest              -Call for transportation              -Make sure your sleep disorder is adequately treated     Some things that have been shown NOT to work when experiencing drowsiness while driving:              -Turning on the radio              -Opening windows              -Eating any  distracting  /  entertaining  foods (e.g., sunflower seeds, candy, or any other)              -Talking on the phone      Your decision may not only impact your life, but also the life of others. Please, remember to drive safe for yourself and all of us.

## 2023-06-08 ENCOUNTER — OFFICE VISIT (OUTPATIENT)
Dept: SLEEP MEDICINE | Facility: CLINIC | Age: 58
End: 2023-06-08
Payer: COMMERCIAL

## 2023-06-08 DIAGNOSIS — G47.30 SLEEP-RELATED BREATHING DISORDER: ICD-10-CM

## 2023-06-08 PROCEDURE — G0399 HOME SLEEP TEST/TYPE 3 PORTA: HCPCS | Performed by: INTERNAL MEDICINE

## 2023-06-08 ASSESSMENT — SLEEP AND FATIGUE QUESTIONNAIRES
HOW LIKELY ARE YOU TO NOD OFF OR FALL ASLEEP WHILE WATCHING TV: MODERATE CHANCE OF DOZING
HOW LIKELY ARE YOU TO NOD OFF OR FALL ASLEEP WHILE SITTING INACTIVE IN A PUBLIC PLACE: SLIGHT CHANCE OF DOZING
HOW LIKELY ARE YOU TO NOD OFF OR FALL ASLEEP WHILE SITTING QUIETLY AFTER LUNCH WITHOUT ALCOHOL: MODERATE CHANCE OF DOZING
HOW LIKELY ARE YOU TO NOD OFF OR FALL ASLEEP WHILE LYING DOWN TO REST IN THE AFTERNOON WHEN CIRCUMSTANCES PERMIT: HIGH CHANCE OF DOZING
HOW LIKELY ARE YOU TO NOD OFF OR FALL ASLEEP WHILE SITTING AND TALKING TO SOMEONE: WOULD NEVER DOZE
HOW LIKELY ARE YOU TO NOD OFF OR FALL ASLEEP WHEN YOU ARE A PASSENGER IN A CAR FOR AN HOUR WITHOUT A BREAK: SLIGHT CHANCE OF DOZING
HOW LIKELY ARE YOU TO NOD OFF OR FALL ASLEEP IN A CAR, WHILE STOPPED FOR A FEW MINUTES IN TRAFFIC: WOULD NEVER DOZE
HOW LIKELY ARE YOU TO NOD OFF OR FALL ASLEEP WHILE SITTING AND READING: SLIGHT CHANCE OF DOZING

## 2023-06-08 NOTE — PROGRESS NOTES
Pt is completing a home sleep test. Pt was instructed on how to put on the Noxturnal T3 device and associated equipment before going to bed and given the opportunity to practice putting it on before leaving the sleep center. Pt was reminded to bring the home sleep test kit back to the center tomorrow, at agreed upon time for download and reporting.   Neck circumference: 48 CM / 19 inches.  Mildred Torres CMA, HST Specialist  [place]

## 2023-06-09 ENCOUNTER — DOCUMENTATION ONLY (OUTPATIENT)
Dept: SLEEP MEDICINE | Facility: CLINIC | Age: 58
End: 2023-06-09
Payer: COMMERCIAL

## 2023-06-09 NOTE — PROGRESS NOTES
This HSAT was performed using a Noxturnal T3 device which recorded snore, sound, movement activity, body position, nasal pressure, oronasal thermal airflow, pulse, oximetry and both chest and abdominal respiratory effort. HSAT data was restricted to the time patient states they were in bed.     HSAT was scored using 1B 4% hypopnea rule.     HST AHI (Non-PAT): 8.9  Snoring was reported as intermittent.  Time with SpO2 below 89% was 18.9 minutes.   Overall signal quality was good     Pt will follow up with sleep provider to determine appropriate therapy.

## 2023-06-12 NOTE — PROCEDURES
"HOME SLEEP STUDY INTERPRETATION    Patient: Lupillo Smith  MRN: [id number]  YOB: 1965  Study Date: 6/8/2023  Referring Provider: No Ref-Primary, Physician;   Ordering Provider: Rebecca ROJAS CNP     Indications for Home Study: Lupillo Smith is a 58 year old male who presents with symptoms suggestive of obstructive sleep apnea.    Estimated body mass index is 41.59 kg/m  as calculated from the following:    Height as of 5/8/23: 1.854 m (6' 1\").    Weight as of 5/8/23: 143 kg (315 lb 4 oz).  Total score - [place]: 10 (6/8/2023  9:34 AM)  Total Score: 5 (6/8/2023  9:35 AM)    Data: A full night home sleep study was performed recording the standard physiologic parameters including body position, movement, sound, nasal pressure, thermal oral airflow, chest and abdominal movements with respiratory inductance plethysmography, and oxygen saturation by pulse oximetry. Pulse rate was estimated by oximetry recording. This study was considered adequate based on > 4 hours of quality oximetry and respiratory recording. As specified by the AASM Manual for the Scoring of Sleep and Associated events, version 2.3, Rule VIII.D 1B, 4% oxygen desaturation scoring for hypopneas is used as a standard of care on all home sleep apnea testing.    Analysis Time:  447.6 minutes    Respiration:   Sleep Associated Hypoxemia: sustained hypoxemia was not present. Baseline oxygen saturation was 95%.  Time with saturation less than or equal to 88% was 3 minutes. The lowest oxygen saturation was 83%.   Snoring: Snoring was present.  Respiratory events: The home study revealed a presence of 6 obstructive apneas and 7 mixed and central apneas. There were 52 hypopneas resulting in a combined apnea/hypopnea index [AHI] of 8.9 events per hour.  AHI was 9.1 per hour supine, N/A per hour prone, 25.4 per hour on left side, and N/A per hour on right side.   Pattern: Excluding events noted above, respiratory rate and pattern was " Normal.    Position: Percent of time spent: supine - 72.6%, prone - 0%, on left - 25.4%, on right - 0%.    Heart Rate: By pulse oximetry normal rate was noted.     Assessment:   Mild obstructive sleep apnea.  Sleep associated hypoxemia was not present.    Recommendations:  Consider auto-CPAP at 5-15 cmH2O, oral appliance therapy or positional therapy.  Suggest optimizing sleep hygiene and avoiding sleep deprivation.  Weight management.    Diagnosis Code(s): Obstructive Sleep Apnea G47.33    Alek Dennis DO, June 11, 2023   Diplomate, American Board of Internal Medicine, Sleep Medicine

## 2023-06-14 DIAGNOSIS — E03.9 HYPOTHYROIDISM, UNSPECIFIED TYPE: ICD-10-CM

## 2023-06-14 NOTE — TELEPHONE ENCOUNTER
Routing refill request to provider for review/approval because:  Labs out of range:  TSH  Patient needs to be seen because:  Has not had medication reviewed by Family practice in more than a year.    Lab Test 05/24/23  1613   TSH 0.03*       BENJAMIN Powell

## 2023-06-15 RX ORDER — LEVOTHYROXINE SODIUM 137 UG/1
274 TABLET ORAL DAILY
Qty: 180 TABLET | Refills: 0 | OUTPATIENT
Start: 2023-06-15

## 2023-06-26 ENCOUNTER — OFFICE VISIT (OUTPATIENT)
Dept: SLEEP MEDICINE | Facility: CLINIC | Age: 58
End: 2023-06-26
Payer: COMMERCIAL

## 2023-06-26 VITALS
DIASTOLIC BLOOD PRESSURE: 85 MMHG | BODY MASS INDEX: 40.81 KG/M2 | HEIGHT: 73 IN | HEART RATE: 78 BPM | WEIGHT: 307.9 LBS | OXYGEN SATURATION: 98 % | SYSTOLIC BLOOD PRESSURE: 133 MMHG

## 2023-06-26 DIAGNOSIS — G47.33 OSA (OBSTRUCTIVE SLEEP APNEA): Primary | ICD-10-CM

## 2023-06-26 PROCEDURE — 99214 OFFICE O/P EST MOD 30 MIN: CPT | Performed by: NURSE PRACTITIONER

## 2023-06-26 ASSESSMENT — SLEEP AND FATIGUE QUESTIONNAIRES
HOW LIKELY ARE YOU TO NOD OFF OR FALL ASLEEP WHILE SITTING AND READING: SLIGHT CHANCE OF DOZING
HOW LIKELY ARE YOU TO NOD OFF OR FALL ASLEEP WHILE WATCHING TV: MODERATE CHANCE OF DOZING
HOW LIKELY ARE YOU TO NOD OFF OR FALL ASLEEP WHILE SITTING INACTIVE IN A PUBLIC PLACE: WOULD NEVER DOZE
HOW LIKELY ARE YOU TO NOD OFF OR FALL ASLEEP IN A CAR, WHILE STOPPED FOR A FEW MINUTES IN TRAFFIC: WOULD NEVER DOZE
HOW LIKELY ARE YOU TO NOD OFF OR FALL ASLEEP WHEN YOU ARE A PASSENGER IN A CAR FOR AN HOUR WITHOUT A BREAK: SLIGHT CHANCE OF DOZING
HOW LIKELY ARE YOU TO NOD OFF OR FALL ASLEEP WHILE SITTING QUIETLY AFTER LUNCH WITHOUT ALCOHOL: SLIGHT CHANCE OF DOZING
HOW LIKELY ARE YOU TO NOD OFF OR FALL ASLEEP WHILE SITTING AND TALKING TO SOMEONE: WOULD NEVER DOZE
HOW LIKELY ARE YOU TO NOD OFF OR FALL ASLEEP WHILE LYING DOWN TO REST IN THE AFTERNOON WHEN CIRCUMSTANCES PERMIT: HIGH CHANCE OF DOZING

## 2023-06-26 NOTE — PATIENT INSTRUCTIONS
Drive Safe... Drive Alive     Sleep health profoundly affects your health, mood, and your safety. 33% of the population (one in three of us) is not getting enough sleep and many have a sleep disorder. Not getting enough sleep or having an untreated / undertreated sleep condition may make us sleepy without even knowing it. In fact, our driving could be dramatically impaired due to our sleep health. As your provider, here are some things I would like you to know about driving:     Here are some warning signs for impairment and dangerous drowsy driving:              -Having been awake more than 16 hours               -Looking tired               -Eyelid drooping              -Head nodding (it could be too late at this point)              -Driving for more than 30 minutes     Some things you could do to make the driving safer if you are experiencing some drowsiness:              -Stop driving and rest              -Call for transportation              -Make sure your sleep disorder is adequately treated     Some things that have been shown NOT to work when experiencing drowsiness while driving:              -Turning on the radio              -Opening windows              -Eating any  distracting  /  entertaining  foods (e.g., sunflower seeds, candy, or any other)              -Talking on the phone      Your decision may not only impact your life, but also the life of others. Please, remember to drive safe for yourself and all of us.       Your BMI is Body mass index is 40.62 kg/m .    What is BMI?  Body mass index (BMI) is one way to tell whether you are at a healthy weight, overweight, or obese. It measures your weight in relation to your height.  A BMI of 18.5 to 24.9 is in the healthy range. A person with a BMI of 25 to 29.9 is considered overweight, and someone with a BMI of 30 or greater is considered obese.  Another way to find out if you are at risk for health problems caused by overweight and obesity is to  measure your waist. If you are a woman and your waist is more than 35 inches, or if you are a man and your waist is more than 40 inches, your risk of disease may be higher.  More than two-thirds of American adults are considered overweight or obese. Being overweight or obese increases the risk for further weight gain.  Excess weight may lead to heart disease and diabetes. Creating and following plans for healthy eating and physical activity may help you improve your health.    Methods for maintaining or losing weight.  Weight control is part of healthy lifestyle and includes exercise, emotional health, and healthy eating habits.  Careful eating habits lifelong is the mainstay of weight control.  Though there are significant health benefits from weight loss, long-term weight loss with diet alone may be very difficult to achieve- studies show long-term success with dietary management in less than 10% of people. Attaining a healthy weight may be especially difficult to achieve in those with severe obesity. In some cases, medications, devices and surgical management might be considered.    What can you do?  If you are overweight or obese and are interested in methods for weight loss, you should discuss this with your provider. In addition, we recommend that you review healthy life styles and methods for weight loss available through the National Institutes of Health patient information sites:   http://win.niddk.nih.gov/publications/index.htm

## 2023-06-26 NOTE — NURSING NOTE
"Chief Complaint   Patient presents with     Study Results       Initial /85   Pulse 78   Ht 1.854 m (6' 1\")   Wt 139.7 kg (307 lb 14.4 oz)   SpO2 98%   BMI 40.62 kg/m   Estimated body mass index is 40.62 kg/m  as calculated from the following:    Height as of this encounter: 1.854 m (6' 1\").    Weight as of this encounter: 139.7 kg (307 lb 14.4 oz).    Medication Reconciliation: complete    Neck circumference:     DME: [employer] [facility]  "

## 2023-06-26 NOTE — PROGRESS NOTES
"Sleep Study Follow-Up Visit:    Date on this visit: 6/26/2023    Lupillo Smith comes in today for follow-up of his home sleep test completed on the evening of 6/8/2023.    Estimated body mass index is 41.59 kg/m  as calculated from the following:    Height as of 5/8/23: 1.854 m (6' 1\").    Weight as of 5/8/23: 143 kg (315 lb 4 oz).  Total score - [place]: 10 (6/8/2023  9:34 AM)  Total Score: 5 (6/8/2023  9:35 AM)    Analysis Time:  447.6 minutes     Respiration:   Sleep Associated Hypoxemia: sustained hypoxemia was not present. Baseline oxygen saturation was 95%.  Time with saturation less than or equal to 88% was 3 minutes. The lowest oxygen saturation was 83%.   Snoring: Snoring was present.  Respiratory events: The home study revealed a presence of 6 obstructive apneas and 7 mixed and central apneas. There were 52 hypopneas resulting in a combined apnea/hypopnea index [AHI] of 8.9 events per hour.  AHI was 9.1 per hour supine, N/A per hour prone, 25.4 per hour on left side, and N/A per hour on right side.   Pattern: Excluding events noted above, respiratory rate and pattern was Normal.     Position: Percent of time spent: supine - 72.6%, prone - 0%, on left - 25.4%, on right - 0%.    These findings were reviewed with patient.     Past medical/surgical history, family history, social history, medications and allergies were reviewed.      Problem List:  Patient Active Problem List    Diagnosis Date Noted     Diabetes mellitus (H) 09/04/2020     Priority: Medium     S/P total hip arthroplasty 09/03/2020     Priority: Medium     Benign essential hypertension 09/01/2020     Priority: Medium     Primary osteoarthritis of right hip 06/17/2020     Priority: Medium     Hypothyroidism 11/06/2018     Priority: Medium     Prediabetes 08/29/2018     Priority: Medium     Hx of papillary thyroid carcinoma 05/02/2016     Priority: Medium     History of total thyroidectomy 09/17/2013     Priority: Medium     Postsurgical " hypothyroidism 08/28/2013     Priority: Medium     Hyperlipidemia 06/27/2007     Priority: Medium     Morbid obesity (H) 05/22/2007     Priority: Medium     Formatting of this note might be different from the original.  Obesity Morbid       Urticaria 05/22/2007     Priority: Medium     Overview:   Urticaria Chronic          Impression/Plan:  Obstructive Sleep Apnea   Will initiate auto titrate CPAP 5-15 cm H2O.  Instructed patient to use minimum of 4 hours each day, at least 70% of the time.  Optimally, patient should use 100% of his sleep time.   Sleep Therapy Management Team   Patient to return to clinic in 4-6 weeks after he receives his CPAP machine for an evaluation of efficacy and compliance.   Patient to optimize his sleep schedule as well as his sleep hygiene practices to mitigate any further sleep intrusion   Weight management   Patient to employ safe driving practices including not driving an automobile if he becomes drowsy.      30 minutes spent with patient, all of which were spent face-to-face counseling, consulting, coordinating plan of care.      CRYSTAL Rodarte, CNP  Sleep Medicine      This note was written with the assistance of the Dragon voice-dictation technology software. The final document, although reviewed, may contain errors. For corrections, please contact the office.      CC: No Ref-Primary, Physician

## 2023-06-30 ENCOUNTER — OFFICE VISIT (OUTPATIENT)
Dept: ENDOCRINOLOGY | Facility: CLINIC | Age: 58
End: 2023-06-30
Payer: COMMERCIAL

## 2023-06-30 VITALS
BODY MASS INDEX: 40.5 KG/M2 | WEIGHT: 307 LBS | HEART RATE: 84 BPM | SYSTOLIC BLOOD PRESSURE: 126 MMHG | DIASTOLIC BLOOD PRESSURE: 84 MMHG

## 2023-06-30 DIAGNOSIS — E11.9 TYPE 2 DIABETES MELLITUS WITHOUT COMPLICATION, WITHOUT LONG-TERM CURRENT USE OF INSULIN (H): Primary | ICD-10-CM

## 2023-06-30 DIAGNOSIS — E88.819 INSULIN RESISTANCE: ICD-10-CM

## 2023-06-30 DIAGNOSIS — E78.5 DYSLIPIDEMIA: ICD-10-CM

## 2023-06-30 DIAGNOSIS — R03.0 ELEVATED BLOOD PRESSURE READING WITHOUT DIAGNOSIS OF HYPERTENSION: ICD-10-CM

## 2023-06-30 DIAGNOSIS — Z85.850 HX OF PAPILLARY THYROID CARCINOMA: ICD-10-CM

## 2023-06-30 DIAGNOSIS — E89.0 POSTSURGICAL HYPOTHYROIDISM: ICD-10-CM

## 2023-06-30 DIAGNOSIS — E89.0 HISTORY OF TOTAL THYROIDECTOMY: ICD-10-CM

## 2023-06-30 DIAGNOSIS — E66.813 CLASS 3 SEVERE OBESITY DUE TO EXCESS CALORIES WITH SERIOUS COMORBIDITY AND BODY MASS INDEX (BMI) OF 40.0 TO 44.9 IN ADULT (H): ICD-10-CM

## 2023-06-30 DIAGNOSIS — E66.01 CLASS 3 SEVERE OBESITY DUE TO EXCESS CALORIES WITH SERIOUS COMORBIDITY AND BODY MASS INDEX (BMI) OF 40.0 TO 44.9 IN ADULT (H): ICD-10-CM

## 2023-06-30 PROCEDURE — 99214 OFFICE O/P EST MOD 30 MIN: CPT | Performed by: INTERNAL MEDICINE

## 2023-06-30 NOTE — LETTER
"    6/30/2023         RE: Lupillo Smith  [address] [place] [address]        Dear Colleague,    Thank you for referring your patient, Lupillo Smith, to the [facility]. Please see a copy of my visit note below.    Subjective:    Established patient    Lupillo Smith is a 58 year old male who presents for hypothyroidism and DM-2.  The following is a comprehensive summary of his endocrine care to date, chart fully reviewed for all endocrinopathies.    # Micro papillary thyroid carcinoma s/p total thyroidectomy in 2013  # Hypothyroidism      He had a long-standing history of thyroid nodules and ultimately underwent total thyroidectomy in 2013. Pre-op he did have SOB from compression. Pre-op: no dysphagia, no hoarseness. No prior H/N radiation. No FH of thyroid cancer.     7/15/2013: underwent total thyroidectomy (thyroid was reported as 337 grams of pathology specimen), per [facility] notes the indication for thyroidectomy was an enlarging goiter with tracheal deviation and the micro PTC was an incidental finding. I did read the operative report (Dr. Arnaud Jones) with the following notable findings: \"the gland was massively enlarged\", no mention of any parathyroid gland damage, no mention of gross extrathyroidal extension, no mention of lymphadenopathy.                            FINAL SURGICAL PATHOLOGY REPORT           Pathology #: [id number]                     Date Obtained: 07/15/2013                                                 Date Received: 07/15/2013     DIAGNOSIS:    Thyroid, total thyroidectomy:      PROCEDURE:                         Total thyroidectomy    RECEIVED:                          In formalin    SPECIMEN INTEGRITY:                Intact    SPECIMEN SIZE:     Right lobe:                       13 x 8.5 x 7.5 cm     Left lobe:                        5.8 x 5 x 2.5 cm      SPECIMEN WEIGHT:                   337 g    TUMOR " "FOCALITY:                    Unifocal      DOMINANT TUMOR:    TUMOR LATERALITY:                  Left lobe    TUMOR SIZE:     Greatest dimension:               0.5 cm    HISTOLOGIC TYPE:                   Papillary carcinoma     Variant, specify:                 Follicular variant     Architecture:                     Follicular     Cytomorphology:                   Classical    HISTOLOGIC GRADE:                  G1: Well differentiated    MARGINS:                           Margins uninvolved by carcinoma     Distance of invasive carcinoma to     closest margin:                   6 mm    TUMOR CAPSULE:                     Partially encapsulated    TUMOR CAPSULAR INVASION:           Not identified    LYMPH-VASCULAR INVASION:           Not identified    EXTRATHYROIDAL EXTENSION:          Not identified      PATHOLOGIC STAGING    PRIMARY TUMOR:                     pT1: Tumor size 2 cm or less,                                       limited to thyroid    REGIONAL LYMPH NODES:              pNX: Regional lymph nodes cannot be                                       assessed     Number examined:                  0     Number involved:                  N/A      ADDITIONAL PATHOLOGIC FINDINGS:    Adenomatous nodule(s) or Nodular                                       follicular disease (eg, nodular                                       hyperplasia, goitrous thyroid).                                       Thryoiditis.        No adjuvant I-131 was given.     Second most recent thyroid US done 9/23/2014 at [facility]:  -images not available, per OSH radiology read: RICHARD but the lateral neck was reportedly not examined     Most recent thyroid US done 1/28/2019 at [facility]:   -images not available, per OSH radiology read: \"small, normal-appearing lymph nodes are seen in the left and right neck. None have microcystic change or calcification.\"    5/2023: TSH 0.03, free T4 elevated     12/20/2022: TSH 0.75, [place] send out lab " "- Tg Ab <1.8 IU/mL, Tg TM 0.3 ng/mL      3/2/2021: per the lab - Tg level \"detected\", \"detected\" means the value is between 0.2 to 0.8 ng/mL, but a numeric value cannot be reported.    2017: Tg TM 0.2 ng/mL, Tg Ab undetectable, TSH 0.45    2014: Tg TM 0.4 ng/mL, Tg Ab undetectable, TSH 12.35 - this was the highest ever Tg TM post-op    6/30/2023: no compressive symptoms. He has not noticed any nodularity in the neck. No persistent cough. He takes LT4 274 mcg daily, 7 days/week. He takes it appropriately to maximize absorption.     3/2022: serum calcium normal (no prior hypercalcemia); he did not have any issues with parathyroid function after surgery     # DM-2    Diagnosed with DM: prediabetic range HbA1c since ~2007    History of DKA/HHS: no     FH of DM: paternal grandmother with DM-2     FH of autoimmune disease: no    Glycemic control over the years:     5/2023: HbA1c 5.4%    12/2022: HbA1c 6.2%        The first HbA1c value is from 2007 and was 6.0%, peak HbA1c over the years was 6.3% in 2016        Labs done 9/2020 were perioperative.     Current DM therapy:  -Metformin XR used for a few years - current dose 1500 mg daily; generally well tolerated   -Mounjaro started 1/2023, increased to 15 mg 4/2023; well tolerated      Prior DM therapy:  -Only metformin and Mounjaro     Current glycemic control: doesn't have a glucometer     Diet: 3 small meals/day, doesn't drink calories outside of milk     Physical activity: home exercise equipment (aerobics) around once weekly, he is a former college football player - [employer]      Tobacco/alcohol use: no    Complications noted in the A/P section.     No prior weight loss surgery/procedure. No prior weight loss medication.    No personal/FH: pancreatitis, pancreatic cancer, MTC, MEN     Objective:    BMI 40.5 kg/meters squared, blood pressure 126/84. Well healed lower anterior neck thyroidectomy scar. No nodularity in the thyroid bed. No cervical LAD. Radial pulse with a " regular rate and rhythm.    12/2022: BMI 46 kg/m2, /88. No overt Cushingoid features. No thyroid eye disease. Well healed lower anterior neck thyroidectomy scar. No nodularity in the thyroid bed. No cervical LAD. Radial pulse with a regular rate and rhythm. DM foot exam performed and normal.     Assessment/Plan:    # Micro papillary thyroid carcinoma s/p total thyroidectomy in 2013  # Hypothyroidism     Reduce levothyroxine from 14 tablets/week to 12.5 tablets/week.  Each tablet is 137 mcg.  He will take 2 tablets/day 6 days/week and on day 7 take 1/2 tablet.    Repeat TSH in 3 months ordered.    # DM-2  # No retinopathy on eye exam 1/2023   # Intermittently elevated blood pressure, hasn't been on an ACE-I/ARB    -12/2022: GFR >90, urine microalbumin normal   # Possible mild peripheral neuropathy   # No prior ASCVD event   # Dyslipidemia, not on ASA or a statin   -5/2023: , , HDL 37, LDL 87  # Medically complicated obesity     [place] has done extremely well and has lost about 40-45 pounds over the past 6 months.  We will continue metformin and Mounjaro at the same dosing. Over the past month or so his weight has plateaued and to break through the plateau he will further calorie restrict (reviewed calorie tracking) and begin incorporating more exercise when his schedule allows.    I did order basic labs to be done in about 3 months.  Return to see me in February.  We also reviewed starting a statin and we will defer at this time but likely do so down the road.    30 minutes spent on the date of the encounter doing chart review, history and exam, documentation and further activities as noted above.               Again, thank you for allowing me to participate in the care of your patient.        Sincerely,        Charan Hodgson MD

## 2023-06-30 NOTE — PROGRESS NOTES
"Subjective:    Established patient    Lupillo Smith is a 58 year old male who presents for hypothyroidism and DM-2.  The following is a comprehensive summary of his endocrine care to date, chart fully reviewed for all endocrinopathies.    # Micro papillary thyroid carcinoma s/p total thyroidectomy in 2013  # Hypothyroidism      He had a long-standing history of thyroid nodules and ultimately underwent total thyroidectomy in 2013. Pre-op he did have SOB from compression. Pre-op: no dysphagia, no hoarseness. No prior H/N radiation. No FH of thyroid cancer.     7/15/2013: underwent total thyroidectomy (thyroid was reported as 337 grams of pathology specimen), per [facility] Endocrine notes the indication for thyroidectomy was an enlarging goiter with tracheal deviation and the micro PTC was an incidental finding. I did read the operative report (Dr. Arnaud Jones) with the following notable findings: \"the gland was massively enlarged\", no mention of any parathyroid gland damage, no mention of gross extrathyroidal extension, no mention of lymphadenopathy.                            FINAL SURGICAL PATHOLOGY REPORT           Pathology #: [id number]                     Date Obtained: 07/15/2013                                                 Date Received: 07/15/2013     DIAGNOSIS:    Thyroid, total thyroidectomy:      PROCEDURE:                         Total thyroidectomy    RECEIVED:                          In formalin    SPECIMEN INTEGRITY:                Intact    SPECIMEN SIZE:     Right lobe:                       13 x 8.5 x 7.5 cm     Left lobe:                        5.8 x 5 x 2.5 cm      SPECIMEN WEIGHT:                   337 g    TUMOR FOCALITY:                    Unifocal      DOMINANT TUMOR:    TUMOR LATERALITY:                  Left lobe    TUMOR SIZE:     Greatest dimension:               0.5 cm    HISTOLOGIC TYPE:                   Papillary carcinoma     Variant, specify:                 Follicular variant " "    Architecture:                     Follicular     Cytomorphology:                   Classical    HISTOLOGIC GRADE:                  G1: Well differentiated    MARGINS:                           Margins uninvolved by carcinoma     Distance of invasive carcinoma to     closest margin:                   6 mm    TUMOR CAPSULE:                     Partially encapsulated    TUMOR CAPSULAR INVASION:           Not identified    LYMPH-VASCULAR INVASION:           Not identified    EXTRATHYROIDAL EXTENSION:          Not identified      PATHOLOGIC STAGING    PRIMARY TUMOR:                     pT1: Tumor size 2 cm or less,                                       limited to thyroid    REGIONAL LYMPH NODES:              pNX: Regional lymph nodes cannot be                                       assessed     Number examined:                  0     Number involved:                  N/A      ADDITIONAL PATHOLOGIC FINDINGS:    Adenomatous nodule(s) or Nodular                                       follicular disease (eg, nodular                                       hyperplasia, goitrous thyroid).                                       Thryoiditis.        No adjuvant I-131 was given.     Second most recent thyroid US done 9/23/2014 at [facility]:  -images not available, per OSH radiology read: RICHARD but the lateral neck was reportedly not examined     Most recent thyroid US done 1/28/2019 at [facility]:   -images not available, per OSH radiology read: \"small, normal-appearing lymph nodes are seen in the left and right neck. None have microcystic change or calcification.\"    5/2023: TSH 0.03, free T4 elevated     12/20/2022: TSH 0.75, [facility] send out lab - Tg Ab <1.8 IU/mL, Tg TM 0.3 ng/mL      3/2/2021: per the lab - Tg level \"detected\", \"detected\" means the value is between 0.2 to 0.8 ng/mL, but a numeric value cannot be reported.    2017: Tg TM 0.2 ng/mL, Tg Ab undetectable, TSH 0.45    2014: Tg TM 0.4 ng/mL, Tg Ab undetectable, " TSH 12.35 - this was the highest ever Tg TM post-op    6/30/2023: no compressive symptoms. He has not noticed any nodularity in the neck. No persistent cough. He takes LT4 274 mcg daily, 7 days/week. He takes it appropriately to maximize absorption.     3/2022: serum calcium normal (no prior hypercalcemia); he did not have any issues with parathyroid function after surgery     # DM-2    Diagnosed with DM: prediabetic range HbA1c since ~2007    History of DKA/HHS: no     FH of DM: paternal grandmother with DM-2     FH of autoimmune disease: no    Glycemic control over the years:     5/2023: HbA1c 5.4%    12/2022: HbA1c 6.2%        The first HbA1c value is from 2007 and was 6.0%, peak HbA1c over the years was 6.3% in 2016        Labs done 9/2020 were perioperative.     Current DM therapy:  -Metformin XR used for a few years - current dose 1500 mg daily; generally well tolerated   -Mounjaro started 1/2023, increased to 15 mg 4/2023; well tolerated      Prior DM therapy:  -Only metformin and Mounjaro     Current glycemic control: doesn't have a glucometer     Diet: 3 small meals/day, doesn't drink calories outside of milk     Physical activity: home exercise equipment (aerobics) around once weekly, he is a former college football player - [employer]      Tobacco/alcohol use: no    Complications noted in the A/P section.     No prior weight loss surgery/procedure. No prior weight loss medication.    No personal/FH: pancreatitis, pancreatic cancer, MTC, MEN     Objective:    BMI 40.5 kg/meters squared, blood pressure 126/84. Well healed lower anterior neck thyroidectomy scar. No nodularity in the thyroid bed. No cervical LAD. Radial pulse with a regular rate and rhythm.    12/2022: BMI 46 kg/m2, /88. No overt Cushingoid features. No thyroid eye disease. Well healed lower anterior neck thyroidectomy scar. No nodularity in the thyroid bed. No cervical LAD. Radial pulse with a regular rate and rhythm. DM foot exam performed  and normal.     Assessment/Plan:    # Micro papillary thyroid carcinoma s/p total thyroidectomy in 2013  # Hypothyroidism     Reduce levothyroxine from 14 tablets/week to 12.5 tablets/week.  Each tablet is 137 mcg.  He will take 2 tablets/day 6 days/week and on day 7 take 1/2 tablet.    Repeat TSH in 3 months ordered.    # DM-2  # No retinopathy on eye exam 1/2023   # Intermittently elevated blood pressure, hasn't been on an ACE-I/ARB    -12/2022: GFR >90, urine microalbumin normal   # Possible mild peripheral neuropathy   # No prior ASCVD event   # Dyslipidemia, not on ASA or a statin   -5/2023: , , HDL 37, LDL 87  # Medically complicated obesity     Stanton has done extremely well and has lost about 40-45 pounds over the past 6 months.  We will continue metformin and Mounjaro at the same dosing. Over the past month or so his weight has plateaued and to break through the plateau he will further calorie restrict (reviewed calorie tracking) and begin incorporating more exercise when his schedule allows.    I did order basic labs to be done in about 3 months.  Return to see me in February.  We also reviewed starting a statin and we will defer at this time but likely do so down the road.    30 minutes spent on the date of the encounter doing chart review, history and exam, documentation and further activities as noted above.

## 2023-07-17 DIAGNOSIS — E11.9 TYPE 2 DIABETES MELLITUS WITHOUT COMPLICATION, WITHOUT LONG-TERM CURRENT USE OF INSULIN (H): ICD-10-CM

## 2023-07-24 ENCOUNTER — DOCUMENTATION ONLY (OUTPATIENT)
Dept: SLEEP MEDICINE | Facility: CLINIC | Age: 58
End: 2023-07-24
Payer: COMMERCIAL

## 2023-07-24 ENCOUNTER — DOCUMENTATION ONLY (OUTPATIENT)
Dept: SLEEP MEDICINE | Facility: CLINIC | Age: 58
End: 2023-07-24

## 2023-07-24 DIAGNOSIS — I10 HYPERTENSION, ESSENTIAL: ICD-10-CM

## 2023-07-24 DIAGNOSIS — G47.33 OBSTRUCTIVE SLEEP APNEA (ADULT) (PEDIATRIC): Primary | ICD-10-CM

## 2023-07-24 NOTE — PROGRESS NOTES
Patient was offered choice of vendor and chose [facility].  Patient Lupillo Smith was set up at [place]  on July 24, 2023. Patient received a Resmed Airsense 10 Pressures were set at  5 - 15 cm H2O.   Patient s ramp is 5 cm H2O for Auto and FLEX/EPR is EPR, 2.  Patient received a Resmed Mask name: AIRFIT F20  Full Face mask size Large, heated tubing and heated humidifier.  Patient has the following compliance requirements: none   Karri Daniel

## 2023-07-27 ENCOUNTER — DOCUMENTATION ONLY (OUTPATIENT)
Dept: SLEEP MEDICINE | Facility: CLINIC | Age: 58
End: 2023-07-27
Payer: COMMERCIAL

## 2023-07-27 NOTE — PROGRESS NOTES
3 day Sleep therapy management telephone visit    Diagnostic AHI:  8.9 HST    Confirmed with patient at time of call- N/A Patient is still interested in STM service.       Message left for patient to return call.        Objective data     Order Settings for PAP  CPAP min     CPAP max     CPAP fixed         Device settings from machine CPAP min 5    CPAP max 15    CPAP fixed     EPR Setting     RESMED soft response       Assessment: Nightly usage over four hours      Action plan: Patient to have 14 day STM visit. Patient has a follow up visit scheduled:   no    Replacement device: No  STM ordered by provider: Yes     Total time spent on accessing and  interpreting remote patient PAP therapy data  10 minutes    Total time spent counseling, coaching  and reviewing PAP therapy data with patient  1 minutes    [id number] no

## 2023-07-27 NOTE — PROGRESS NOTES
STM Recheck:: Patient called back he is having trouble getting enough CPAP pressure when he first starts CPAP.  Turned patient's ramp off.  Patient mask he has trouble wearing it it bothers the bridge of his nose.  Patient is going to cut a T-Shirt and use it as a barrier between his skin and mask.

## 2023-07-31 DIAGNOSIS — E03.9 HYPOTHYROIDISM, UNSPECIFIED TYPE: ICD-10-CM

## 2023-07-31 NOTE — TELEPHONE ENCOUNTER
"Routing refill request to provider for review/approval because:  Failed RN protocol:    Recent (12 mo) or future (30 days) visit within the authorizing provider's specialty    Patient has had an office visit with the authorizing provider or a provider within the authorizing providers department within the previous 12 mos or has a future within next 30 days. See \"Patient Info\" tab in inbasket, or \"Choose Columns\" in Meds & Orders section of the refill encounter.         Normal TSH on file in past 12 months        Recent Labs   Lab Test 05/24/23  1613   TSH 0.03*       BENJAMIN Powell    "

## 2023-08-01 RX ORDER — LEVOTHYROXINE SODIUM 137 UG/1
TABLET ORAL
Qty: 180 TABLET | Refills: 3 | Status: SHIPPED | OUTPATIENT
Start: 2023-08-01 | End: 2024-02-13

## 2023-08-01 NOTE — TELEPHONE ENCOUNTER
I see the last TSH is low, not sure what your plan for this is.... can you address the refill, thanks.

## 2023-08-08 ENCOUNTER — DOCUMENTATION ONLY (OUTPATIENT)
Dept: SLEEP MEDICINE | Facility: CLINIC | Age: 58
End: 2023-08-08
Payer: COMMERCIAL

## 2023-08-09 NOTE — PROGRESS NOTES
14  DAY STM VISIT    Diagnostic AHI:  8.9 HST    Subjective measures:   Patient not sleeping well with his CPAP he is looking to get a nasal CPAP mask.      Assessment: Pt not meeting objective benchmarks for AHI and compliance  Patient failing following subjective benchmarks: mask discomfort     Action plan: pt to have 30 day STM visit.      Device type: Auto-CPAP    PAP settings: CPAP min 5 cm  H20       CPAP max 15 cm  H20          Mask type:  Per patient choice    Objective measures: 14 day rolling measures      Compliance   %      Leak    lpm  last  upload      AHI    last  upload      Average number of minutes       Objective measure goal  Compliance   Goal >70%  Leak   Goal < 24 lpm  AHI  Goal < 5  Usage  Goal >240        Total time spent on accessing and interpreting remote patient PAP therapy data  10 minutes    Total time spent counseling, coaching  and reviewing PAP therapy data with patient  5 minutes    [id number]  [id number]  no (3 day STM)

## 2023-08-24 ENCOUNTER — DOCUMENTATION ONLY (OUTPATIENT)
Dept: SLEEP MEDICINE | Facility: CLINIC | Age: 58
End: 2023-08-24
Payer: COMMERCIAL

## 2023-08-24 NOTE — PROGRESS NOTES
30 DAY STM VISIT    Diagnostic AHI:  8.9 HST    PAP settings:  CPAP MIN CPAP MAX 95TH % PRESSURE EPR RESMED SOFT RESPONSE SETTING   5.0 cm  H20 15.0 cm  H20 8.9 cm  H20  TWO OFF     Device type: Auto-CPAP  Mask type:  Per patient choice    Objective measures: 14 day rolling measures:    COMPLIANCE LEAK AHI AVERAGE USE IN MINUTES   57 % 21 3.59 238   GOAL >70% GOAL < 24 LPM GOAL <5 GOAL >240        Assessment: Pt not meeting objective benchmarks for compliance  Patient failing following subjective benchmarks: pressure issues  Subjective measures: Patient reports that he is trying everything with CPAP but still not able to find benefit or consistent good sleep. He feels that his sleep is worse since starting CPAP. His GLENN is mild and he is unsure what benefit to expect. He says there is no consistent thing that makes it difficult. He is determined to try for a few weeks. He feels sometimes that the pressure is too high. His ramp was turned off, turned it back on. He likes his -nasal mask better than the FFM.   [facility] is piloting use of Vcom, CPAP assist. Will refer pt to try Vcom and follow up.   Action plan: pt to have 14 day STM visit.  Patient has the following upcoming sleep appts:    Total time spent on accessing and interpreting remote patient PAP therapy data  10 minutes    Total time spent counseling, coaching  and reviewing PAP therapy data with patient  15 minutes     [id number] this call  [id number] no  at 3 or 14 day [facility]

## 2023-09-30 ENCOUNTER — HEALTH MAINTENANCE LETTER (OUTPATIENT)
Age: 58
End: 2023-09-30

## 2023-10-09 ENCOUNTER — LAB (OUTPATIENT)
Dept: LAB | Facility: CLINIC | Age: 58
End: 2023-10-09
Payer: COMMERCIAL

## 2023-10-09 DIAGNOSIS — E89.0 POSTSURGICAL HYPOTHYROIDISM: ICD-10-CM

## 2023-10-09 DIAGNOSIS — E11.9 TYPE 2 DIABETES MELLITUS WITHOUT COMPLICATION, WITHOUT LONG-TERM CURRENT USE OF INSULIN (H): ICD-10-CM

## 2023-10-09 DIAGNOSIS — Z85.850 HX OF PAPILLARY THYROID CARCINOMA: ICD-10-CM

## 2023-10-09 LAB
CREAT SERPL-MCNC: 0.95 MG/DL (ref 0.67–1.17)
EGFRCR SERPLBLD CKD-EPI 2021: >90 ML/MIN/1.73M2
T4 FREE SERPL-MCNC: 1.98 NG/DL (ref 0.9–1.7)
TSH SERPL DL<=0.005 MIU/L-ACNC: 0.05 UIU/ML (ref 0.3–4.2)

## 2023-10-09 PROCEDURE — 82565 ASSAY OF CREATININE: CPT

## 2023-10-09 PROCEDURE — 84443 ASSAY THYROID STIM HORMONE: CPT

## 2023-10-09 PROCEDURE — 84439 ASSAY OF FREE THYROXINE: CPT

## 2023-10-09 PROCEDURE — 36415 COLL VENOUS BLD VENIPUNCTURE: CPT

## 2023-10-12 LAB
CREAT UR-MCNC: 194.6 MG/DL
MICROALBUMIN UR-MCNC: 15.4 MG/L
MICROALBUMIN/CREAT UR: 7.91 MG/G CR (ref 0–17)

## 2023-10-12 PROCEDURE — 82043 UR ALBUMIN QUANTITATIVE: CPT

## 2023-10-12 PROCEDURE — 82570 ASSAY OF URINE CREATININE: CPT

## 2023-11-19 DIAGNOSIS — Z85.850 HX OF PAPILLARY THYROID CARCINOMA: ICD-10-CM

## 2023-11-19 DIAGNOSIS — E89.0 POSTSURGICAL HYPOTHYROIDISM: ICD-10-CM

## 2023-11-19 DIAGNOSIS — E11.9 TYPE 2 DIABETES MELLITUS WITHOUT COMPLICATION, WITHOUT LONG-TERM CURRENT USE OF INSULIN (H): Primary | ICD-10-CM

## 2024-01-21 ASSESSMENT — ENCOUNTER SYMPTOMS
HEARTBURN: 0
DIZZINESS: 0
JOINT SWELLING: 0
NERVOUS/ANXIOUS: 0
FREQUENCY: 0
PALPITATIONS: 0
DYSURIA: 0
CHILLS: 0
SHORTNESS OF BREATH: 0
HEMATURIA: 0
FEVER: 0
SORE THROAT: 0
WEAKNESS: 0
MYALGIAS: 0
EYE PAIN: 0
CONSTIPATION: 0
ARTHRALGIAS: 0
DIARRHEA: 0
HEMATOCHEZIA: 0
PARESTHESIAS: 0
ABDOMINAL PAIN: 0
HEADACHES: 0
COUGH: 0
NAUSEA: 0

## 2024-01-22 ENCOUNTER — OFFICE VISIT (OUTPATIENT)
Dept: FAMILY MEDICINE | Facility: CLINIC | Age: 59
End: 2024-01-22
Payer: COMMERCIAL

## 2024-01-22 VITALS
HEART RATE: 96 BPM | SYSTOLIC BLOOD PRESSURE: 116 MMHG | WEIGHT: 286.8 LBS | BODY MASS INDEX: 38.85 KG/M2 | RESPIRATION RATE: 16 BRPM | DIASTOLIC BLOOD PRESSURE: 84 MMHG | OXYGEN SATURATION: 98 % | TEMPERATURE: 97.6 F | HEIGHT: 72 IN

## 2024-01-22 DIAGNOSIS — Z12.5 SCREENING FOR MALIGNANT NEOPLASM OF PROSTATE: ICD-10-CM

## 2024-01-22 DIAGNOSIS — Z11.59 ENCOUNTER FOR HEPATITIS C SCREENING TEST FOR LOW RISK PATIENT: ICD-10-CM

## 2024-01-22 DIAGNOSIS — Z11.4 SCREENING FOR HIV (HUMAN IMMUNODEFICIENCY VIRUS): ICD-10-CM

## 2024-01-22 DIAGNOSIS — Z00.00 ROUTINE PHYSICAL EXAMINATION: Primary | ICD-10-CM

## 2024-01-22 DIAGNOSIS — Z13.6 CARDIOVASCULAR SCREENING; LDL GOAL LESS THAN 130: ICD-10-CM

## 2024-01-22 DIAGNOSIS — Z12.11 SCREEN FOR COLON CANCER: ICD-10-CM

## 2024-01-22 PROBLEM — E11.9 DIABETES MELLITUS (H): Status: RESOLVED | Noted: 2020-09-04 | Resolved: 2024-01-22

## 2024-01-22 PROCEDURE — 99386 PREV VISIT NEW AGE 40-64: CPT | Performed by: NURSE PRACTITIONER

## 2024-01-22 ASSESSMENT — ENCOUNTER SYMPTOMS
FREQUENCY: 0
ABDOMINAL PAIN: 0
COUGH: 0
NERVOUS/ANXIOUS: 0
NAUSEA: 0
CHILLS: 0
FEVER: 0
DIZZINESS: 0
DIARRHEA: 0
CONSTIPATION: 0
SORE THROAT: 0
WEAKNESS: 0
MYALGIAS: 0
ARTHRALGIAS: 0
JOINT SWELLING: 0
DYSURIA: 0
SHORTNESS OF BREATH: 0
HEMATURIA: 0
EYE PAIN: 0
HEADACHES: 0
PALPITATIONS: 0

## 2024-01-22 NOTE — PROGRESS NOTES
"Preventive Care Visit  [facility]  Elidia Rodriges, CRYSTAL KINCAID, Family Medicine  Jan 22, 2024      SUBJECTIVE:   Stanton is a 58 year old, presenting for the following:  Physical        1/22/2024     2:04 PM   Additional Questions   Roomed by Jie ABDI     Healthy Habits:     Getting at least 3 servings of Calcium per day:  NO    Bi-annual eye exam:  Yes    Dental care twice a year:  Yes    Sleep apnea or symptoms of sleep apnea:  None    Diet:  Regular (no restrictions)    Frequency of exercise:  2-3 days/week    Duration of exercise:  15-30 minutes    Taking medications regularly:  Yes    Additional concerns today:  No      Today's PHQ-2 Score:       1/21/2024     2:46 PM   PHQ-2 ( 1999 [employer])   Q1: Little interest or pleasure in doing things 0   Q2: Feeling down, depressed or hopeless 0   PHQ-2 Score 0   Q1: Little interest or pleasure in doing things Not at all   Q2: Feeling down, depressed or hopeless Not at all   PHQ-2 Score 0     Via the Health Maintenance questionnaire, the patient has reported the following services have been completed -Eye Exam, this information has been sent to the abstraction team.          Social History     Tobacco Use    Smoking status: Some Days     Types: Cigars    Smokeless tobacco: Never    Tobacco comments:     One cigar every three months- very very rare   Substance Use Topics    Alcohol use: Yes     Comment: rare             1/21/2024     2:45 PM   Alcohol Use   Prescreen: >3 drinks/day or >7 drinks/week? No       Last PSA: No results found for: \"PSA\"    Reviewed orders with patient. Reviewed health maintenance and updated orders accordingly - Yes  BP Readings from Last 3 Encounters:   01/22/24 116/84   06/30/23 126/84   06/26/23 133/85    Wt Readings from Last 3 Encounters:   01/22/24 130.1 kg (286 lb 12.8 oz)   06/30/23 139.3 kg (307 lb)   06/26/23 139.7 kg (307 lb 14.4 oz)                    Reviewed and updated as needed this visit by clinical " "staff   Tobacco  Allergies  Meds              Reviewed and updated as needed this visit by Provider                    Review of Systems   Constitutional:  Negative for chills and fever.   HENT:  Negative for congestion, ear pain, hearing loss and sore throat.    Eyes:  Negative for pain and visual disturbance.   Respiratory:  Negative for cough and shortness of breath.    Cardiovascular:  Negative for chest pain and palpitations.   Gastrointestinal:  Negative for abdominal pain, constipation, diarrhea and nausea.   Genitourinary:  Negative for dysuria, frequency, genital sores, hematuria, penile discharge and urgency.   Musculoskeletal:  Negative for arthralgias, joint swelling and myalgias.   Skin:  Negative for rash.   Neurological:  Negative for dizziness, weakness and headaches.   Psychiatric/Behavioral:  The patient is not nervous/anxious.          OBJECTIVE:   /84   Pulse 96   Temp 97.6  F (36.4  C) (Tympanic)   Resp 16   Ht 1.816 m (5' 11.5\")   Wt 130.1 kg (286 lb 12.8 oz)   SpO2 98%   BMI 39.44 kg/m     Estimated body mass index is 39.44 kg/m  as calculated from the following:    Height as of this encounter: 1.816 m (5' 11.5\").    Weight as of this encounter: 130.1 kg (286 lb 12.8 oz).  Physical Exam  GENERAL: alert and no distress  EYES: Eyes grossly normal to inspection and conjunctivae and sclerae normal  HENT: normal cephalic/atraumatic, ear canals and TM's normal, oropharynx clear, and oral mucous membranes moist  NECK: no adenopathy, no asymmetry, masses, or scars  RESP: lungs clear to auscultation - no rales, rhonchi or wheezes  CV: regular rate and rhythm, normal S1 S2, no S3 or S4, no murmur, click or rub, no peripheral edema  ABDOMEN: soft, nontender and no HSM, fatty tumor palpated to RLQ  MS: no gross musculoskeletal defects noted, no edema  SKIN: no suspicious lesions or rashes  NEURO: Normal strength and tone, mentation intact and speech normal  PSYCH: mentation appears normal, " "affect normal/bright    Diagnostic Test Results:  Labs reviewed in Epic    ASSESSMENT/PLAN:   Routine physical examination      Screen for colon cancer    - Colonoscopy Screening  Referral; Future    CARDIOVASCULAR SCREENING; LDL GOAL LESS THAN 130    - Lipid panel reflex to direct LDL Fasting; Future    Screening for HIV (human immunodeficiency virus)    - HIV Antigen Antibody Combo; Future    Encounter for hepatitis C screening test for low risk patient    - Hepatitis C Screen Reflex to HCV RNA Quant and Genotype; Future          Counseling  Reviewed preventive health counseling, as reflected in patient instructions      BMI  Estimated body mass index is 39.44 kg/m  as calculated from the following:    Height as of this encounter: 1.816 m (5' 11.5\").    Weight as of this encounter: 130.1 kg (286 lb 12.8 oz).   Weight management plan: Patient referred to endocrine and/or weight management specialty      He reports that he has been smoking cigars. He has never used smokeless tobacco.  Nicotine/Tobacco Cessation Plan  Information offered: Patient not interested at this time            Signed Electronically by: CRYSTAL Laura CNP  Answers submitted by the patient for this visit:  Annual Preventive Visit (Submitted on 1/21/2024)  Chief Complaint: Annual Exam:  Blood in stool: No  heartburn: No  peripheral edema: No  mood changes: No  Skin sensation changes: No  impotence: No    " No Exclusions Apply

## 2024-01-29 ENCOUNTER — LAB (OUTPATIENT)
Dept: LAB | Facility: CLINIC | Age: 59
End: 2024-01-29
Payer: COMMERCIAL

## 2024-01-29 DIAGNOSIS — Z11.4 SCREENING FOR HIV (HUMAN IMMUNODEFICIENCY VIRUS): ICD-10-CM

## 2024-01-29 DIAGNOSIS — Z12.5 SCREENING FOR MALIGNANT NEOPLASM OF PROSTATE: ICD-10-CM

## 2024-01-29 DIAGNOSIS — E11.9 TYPE 2 DIABETES MELLITUS WITHOUT COMPLICATION, WITHOUT LONG-TERM CURRENT USE OF INSULIN (H): ICD-10-CM

## 2024-01-29 DIAGNOSIS — Z13.6 CARDIOVASCULAR SCREENING; LDL GOAL LESS THAN 130: ICD-10-CM

## 2024-01-29 DIAGNOSIS — Z11.59 ENCOUNTER FOR HEPATITIS C SCREENING TEST FOR LOW RISK PATIENT: ICD-10-CM

## 2024-01-29 DIAGNOSIS — E89.0 POSTSURGICAL HYPOTHYROIDISM: ICD-10-CM

## 2024-01-29 LAB
ALBUMIN SERPL BCG-MCNC: 4.4 G/DL (ref 3.5–5.2)
ANION GAP SERPL CALCULATED.3IONS-SCNC: 13 MMOL/L (ref 7–15)
BUN SERPL-MCNC: 13.5 MG/DL (ref 6–20)
CALCIUM SERPL-MCNC: 8.8 MG/DL (ref 8.6–10)
CHLORIDE SERPL-SCNC: 103 MMOL/L (ref 98–107)
CHOLEST SERPL-MCNC: 163 MG/DL
CREAT SERPL-MCNC: 0.82 MG/DL (ref 0.67–1.17)
DEPRECATED HCO3 PLAS-SCNC: 24 MMOL/L (ref 22–29)
EGFRCR SERPLBLD CKD-EPI 2021: >90 ML/MIN/1.73M2
FASTING STATUS PATIENT QL REPORTED: YES
FASTING STATUS PATIENT QL REPORTED: YES
GLUCOSE SERPL-MCNC: 110 MG/DL (ref 70–99)
GLUCOSE SERPL-MCNC: 110 MG/DL (ref 70–99)
HBA1C MFR BLD: 5.5 % (ref 0–5.6)
HCV AB SERPL QL IA: NONREACTIVE
HDLC SERPL-MCNC: 48 MG/DL
HIV 1+2 AB+HIV1 P24 AG SERPL QL IA: NONREACTIVE
LDLC SERPL CALC-MCNC: 97 MG/DL
NONHDLC SERPL-MCNC: 115 MG/DL
PHOSPHATE SERPL-MCNC: 3.1 MG/DL (ref 2.5–4.5)
POTASSIUM SERPL-SCNC: 3.8 MMOL/L (ref 3.4–5.3)
SODIUM SERPL-SCNC: 140 MMOL/L (ref 135–145)
TRIGL SERPL-MCNC: 92 MG/DL
TSH SERPL DL<=0.005 MIU/L-ACNC: 0.4 UIU/ML (ref 0.3–4.2)

## 2024-01-29 PROCEDURE — G0103 PSA SCREENING: HCPCS

## 2024-01-29 PROCEDURE — 87389 HIV-1 AG W/HIV-1&-2 AB AG IA: CPT

## 2024-01-29 PROCEDURE — 36415 COLL VENOUS BLD VENIPUNCTURE: CPT

## 2024-01-29 PROCEDURE — 84443 ASSAY THYROID STIM HORMONE: CPT

## 2024-01-29 PROCEDURE — 83036 HEMOGLOBIN GLYCOSYLATED A1C: CPT

## 2024-01-29 PROCEDURE — 80061 LIPID PANEL: CPT

## 2024-01-29 PROCEDURE — 86803 HEPATITIS C AB TEST: CPT

## 2024-01-29 PROCEDURE — 80069 RENAL FUNCTION PANEL: CPT

## 2024-01-30 PROBLEM — E66.01 CLASS 2 SEVERE OBESITY DUE TO EXCESS CALORIES WITH SERIOUS COMORBIDITY IN ADULT (H): Status: ACTIVE | Noted: 2024-01-30

## 2024-01-30 PROBLEM — E66.812 CLASS 2 SEVERE OBESITY DUE TO EXCESS CALORIES WITH SERIOUS COMORBIDITY IN ADULT (H): Status: ACTIVE | Noted: 2024-01-30

## 2024-01-30 LAB — PSA SERPL DL<=0.01 NG/ML-MCNC: 0.81 NG/ML (ref 0–3.5)

## 2024-01-30 NOTE — RESULT ENCOUNTER NOTE
"[facility] Eid    Your lab results came back within normal/acceptable limits. Please let us know if you have any questions.     Take care,    CRYSTAL Aviles CNP    TEST DESCRIPTIONS   CBC (Complete Blood Count) includes hemoglobin, hematocrit, white blood cells, etc. This test can be used to detect anemia, infection, and abnormalities in blood cells.   Cholesterol is one of the blood fats (lipids) and is the building block used by the body for cell wall and hormone production. Increased levels of cholesterol have been proven to directly contribute to heart disease and strokes.   Triglycerides are one of the blood fats (lipids) and are thought to be associated with heart disease. If you have had anything to eat or drink other than water for 12 hours before the test and your level is high, you should have the test repeated after a 12 hour fast. Abnormally high results may also be associated with diabetes, kidney and liver diseases.   LDL is the low density lipoprotein component of the cholesterol. It is the harmful substance that deposits cholesterol on the artery walls contributing to heart attacks and strokes. A high LDL level is associated with higher risk of coronary heart disease.   HDL stands for high density lipoprotein and refers to the so-called \"good\" cholesterol. HDL picks up excess cholesterol in the bloodstream and carries it back to the liver for disposal. Individuals with higher than average HDL seem to have a lower risk of coronary disease. Vigorous exercise will help increase the blood levels of HDL.   Risk Factor (Total cholesterol/HDL) is a commonly used ratio for cardiac risk assessment. Less than 5.0 for men and 4.4 for women is ideal.   Sodium is an electrolyte useful in diagnosis of dehydration, diabetes, hypertension, or other diseases involving electrolyte imbalance. It also preserves the balance between calcium and potassium to maintain normal heart action and equilibrium of the body. "   Potassium is also an electrolyte that works with sodium to regulate the body's water balance and normalize heart rhythm.   Glucose is a measure of blood sugar and is one of the tests for diabetes. If you have not been fasting, your level will often be high. A low glucose level may be a cause of weakness or dizziness. Blood sugar ranks with cholesterol as a causative factor in arteriosclerosis and heart attacks.   BUN & Creatinine are waste products excreted by the kidneys. A high BUN can be related to a high protein diet, heavy exercise, fever and infections, dehydration, kidney stones, and kidney disease. Creatinine elevation is less dependent on diet or exercise and better represents kidney impairment. Low values are not generally significant.   Calcium is a mineral in the blood controlled by the parathyroid glands and kidneys. It is important in the formation of bone, in muscle and nerve function, and in blood clotting. Disease of the parathyroid gland, diseased bones or kidneys, or defective absorption of calcium may cause abnormal levels from the intestine.   ALT, AST, Alk Phos are liver tests. Enzymes found in the liver as well as skeletal and cardiac muscle. Elevations can often be seen in alcoholism, liver or heart disease. Slightly abnormal values are not considered significant.   TSH stands for Thyroid Stimulating Hormone. A sensitive test used to determine how the thyroid gland is functioning. The thyroid gland produces hormones that control the body's metabolism. When too few hormones are produced (increased TSH), hypothyroidism occurs which can cause fatigue, sensitivity to cold, and weight gain - an overall slowing down of bodily functions. When too many thyroid hormones are produces (or decreased TSH), it creates a condition call hyperthyroidism (such as Graves' disease) which causes rapid heartbeat, weight loss, and dizziness, among other symptoms.   PSA stands for Prostate Specific Antigen.  Elevated levels of PSA can increase with trauma, infection, inflammation, or disease processes in the prostate such as BPH (Benigh Prostatic Hypertrophy) or cancer.   Glycohemoglobin or HgBA1C is a 3-month average of blood sugar

## 2024-01-30 NOTE — RESULT ENCOUNTER NOTE
Trinidad Eid    Your prostate screening is normal. Please let us know if you have any questions.     Take care,    CRYSTAL Aviles CNP

## 2024-02-12 NOTE — PROGRESS NOTES
"Subjective:    Established patient    Lupillo Smith is a 58 year old male who presents for hypothyroidism and DM-2.  The following is a comprehensive summary of his endocrine care to date, chart fully reviewed for all endocrinopathies.    # Micro papillary thyroid carcinoma s/p total thyroidectomy in 2013    He had a long-standing history of thyroid nodules and ultimately underwent total thyroidectomy in 2013. Pre-op he did have SOB from compression. Pre-op: no dysphagia, no hoarseness. No prior H/N radiation. No FH of thyroid cancer.     7/15/2013: underwent total thyroidectomy (thyroid was reported as 337 grams of pathology specimen), per [facility] notes the indication for thyroidectomy was an enlarging goiter with tracheal deviation and the micro PTC was an incidental finding. I did read the operative report (Dr. Arnaud Jones) with the following notable findings: \"the gland was massively enlarged\", no mention of any parathyroid gland damage, no mention of gross extrathyroidal extension, no mention of lymphadenopathy.                            FINAL SURGICAL PATHOLOGY REPORT           Pathology #: [id number]                     Date Obtained: 07/15/2013                                                 Date Received: 07/15/2013     DIAGNOSIS:    Thyroid, total thyroidectomy:      PROCEDURE:                         Total thyroidectomy    RECEIVED:                          In formalin    SPECIMEN INTEGRITY:                Intact    SPECIMEN SIZE:     Right lobe:                       13 x 8.5 x 7.5 cm     Left lobe:                        5.8 x 5 x 2.5 cm      SPECIMEN WEIGHT:                   337 g    TUMOR FOCALITY:                    Unifocal      DOMINANT TUMOR:    TUMOR LATERALITY:                  Left lobe    TUMOR SIZE:     Greatest dimension:               0.5 cm    HISTOLOGIC TYPE:                   Papillary carcinoma     Variant, specify:                 Follicular variant     Architecture:       " "              Follicular     Cytomorphology:                   Classical    HISTOLOGIC GRADE:                  G1: Well differentiated    MARGINS:                           Margins uninvolved by carcinoma     Distance of invasive carcinoma to     closest margin:                   6 mm    TUMOR CAPSULE:                     Partially encapsulated    TUMOR CAPSULAR INVASION:           Not identified    LYMPH-VASCULAR INVASION:           Not identified    EXTRATHYROIDAL EXTENSION:          Not identified      PATHOLOGIC STAGING    PRIMARY TUMOR:                     pT1: Tumor size 2 cm or less,                                       limited to thyroid    REGIONAL LYMPH NODES:              pNX: Regional lymph nodes cannot be                                       assessed     Number examined:                  0     Number involved:                  N/A      ADDITIONAL PATHOLOGIC FINDINGS:    Adenomatous nodule(s) or Nodular                                       follicular disease (eg, nodular                                       hyperplasia, goitrous thyroid).                                       Thryoiditis.        No adjuvant I-131 was given.     Second most recent thyroid US done 9/23/2014 at [facility]:  -images not available, per OSH radiology read: RICHARD but the lateral neck was reportedly not examined     Most recent thyroid US done 1/28/2019 at [facility]:   -images not available, per OSH radiology read: \"small, normal-appearing lymph nodes are seen in the left and right neck. None have microcystic change or calcification.\"    12/20/2022: TSH 0.75, [facility] send out lab - Tg Ab <1.8 IU/mL, Tg TM 0.3 ng/mL      3/2/2021: per the lab - Tg level \"detected\", \"detected\" means the value is between 0.2 to 0.8 ng/mL, but a numeric value cannot be reported.    2017: Tg TM 0.2 ng/mL, Tg Ab undetectable, TSH 0.45    2014: Tg TM 0.4 ng/mL, Tg Ab undetectable, TSH 12.35 - this was the highest ever Tg TM " post-op    2/13/2024: no compressive symptoms. He has not noticed any nodularity in the neck.    3/2022: serum calcium normal (no prior hypercalcemia); he did not have any issues with parathyroid function after surgery     # Hypothyroidism      11/19/2023: reduce levothyroxine to 11 tabs/week    1/2024: TSH 0.4 (normal)    2/13/2024: he continues levothyroxine 137 mcg tabs, 11 tabs/week    # DM-2    Diagnosed with DM: prediabetic range HbA1c since ~2007    History of DKA/HHS: no     FH of DM: paternal grandmother with DM-2     FH of autoimmune disease: no    Glycemic control over the years: excellent     Current DM therapy:  -Metformin XR used for a few years - current dose 1500 mg daily; generally well tolerated   -Mounjaro started 1/2023, increased to 15 mg 4/2023; well tolerated      Prior DM therapy:  -Only metformin and Mounjaro     Current glycemic control: doesn't have a glucometer     Diet: 3 small meals/day, doesn't drink calories outside of milk     Physical activity: home exercise equipment (aerobics) around once weekly, he is a former college football player - [employer]      Tobacco/alcohol use: no    Complications noted in the A/P section.     No prior weight loss surgery/procedure. No prior weight loss medication.    No personal/FH: pancreatitis, pancreatic cancer, MTC, MEN     Objective:    2/13/2024: BMI 40.3 kg/m2, /78. Well healed lower anterior neck thyroidectomy scar. No nodularity in the thyroid bed. No cervical LAD.     6/2023: BMI 40.5 kg/meters squared, blood pressure 126/84. Well healed lower anterior neck thyroidectomy scar. No nodularity in the thyroid bed. No cervical LAD. Radial pulse with a regular rate and rhythm.    12/2022: BMI 46 kg/m2, /88. No overt Cushingoid features. No thyroid eye disease. Well healed lower anterior neck thyroidectomy scar. No nodularity in the thyroid bed. No cervical LAD. Radial pulse with a regular rate and rhythm. DM foot exam performed and normal.      Assessment/Plan:    # Micro papillary thyroid carcinoma s/p total thyroidectomy in 2013  # Hypothyroidism     I ordered a neck ultrasound, thyroglobulin antibody, and TSH.  He will do the labs in about 3 months.  He will do the neck ultrasound at any point in the next 12 months.  He will let me know via TE2t when labs and ultrasound are done and I will review.    Prophylactically reduce levothyroxine from 11 tabs per week to 10 tabs per week.  Each tab is 137 mcg.    # DM-2  -1/2024:  mg/dL, HbA1c 5.5%  # No retinopathy on eye exam 1/2023   -He has a local eye exam coming up  # Intermittently elevated blood pressure, hasn't been on an ACE-I/ARB    -10/2023: urine microalbumin normal   -1/2024: GFR >90  # Possible mild peripheral neuropathy   # No prior ASCVD event   # Dyslipidemia, not on ASA or a statin   -1/2024: , TG 92, HDL 48, LDL 97  # Medically complicated obesity     His weight has plateaued and to break through the plateau he will calorie restrict (we reviewed calorie tracking - My Fitness Pal) and continue exercising. No change in DM medications.     We also reviewed starting a statin and we will defer at this time but likely do so down the road.    I prescribed a glucometer, he will check a glucose a few times per week to include fasting, before meals, bedtime, and 2 hours after eating.  Goal fasting and before meals less than 130 mg/deciliter and goal 2 hours after eating less than 150 mg/deciliter.    Return to see me in 1 year with diabetes labs ordered.    20 minutes spent on the date of the encounter doing chart review, history and exam, documentation and further activities as noted above.

## 2024-02-13 ENCOUNTER — OFFICE VISIT (OUTPATIENT)
Dept: ENDOCRINOLOGY | Facility: CLINIC | Age: 59
End: 2024-02-13
Payer: COMMERCIAL

## 2024-02-13 VITALS
DIASTOLIC BLOOD PRESSURE: 78 MMHG | BODY MASS INDEX: 40.3 KG/M2 | SYSTOLIC BLOOD PRESSURE: 122 MMHG | HEART RATE: 84 BPM | WEIGHT: 293 LBS

## 2024-02-13 DIAGNOSIS — E66.01 CLASS 3 SEVERE OBESITY DUE TO EXCESS CALORIES WITH SERIOUS COMORBIDITY AND BODY MASS INDEX (BMI) OF 40.0 TO 44.9 IN ADULT (H): ICD-10-CM

## 2024-02-13 DIAGNOSIS — E66.813 CLASS 3 SEVERE OBESITY DUE TO EXCESS CALORIES WITH SERIOUS COMORBIDITY AND BODY MASS INDEX (BMI) OF 40.0 TO 44.9 IN ADULT (H): ICD-10-CM

## 2024-02-13 DIAGNOSIS — Z85.850 HX OF PAPILLARY THYROID CARCINOMA: Primary | ICD-10-CM

## 2024-02-13 DIAGNOSIS — E78.5 DYSLIPIDEMIA: ICD-10-CM

## 2024-02-13 DIAGNOSIS — E88.819 INSULIN RESISTANCE: ICD-10-CM

## 2024-02-13 DIAGNOSIS — E11.9 TYPE 2 DIABETES MELLITUS WITHOUT COMPLICATION, WITHOUT LONG-TERM CURRENT USE OF INSULIN (H): ICD-10-CM

## 2024-02-13 DIAGNOSIS — E89.0 POSTSURGICAL HYPOTHYROIDISM: ICD-10-CM

## 2024-02-13 PROCEDURE — 99213 OFFICE O/P EST LOW 20 MIN: CPT | Performed by: INTERNAL MEDICINE

## 2024-02-13 RX ORDER — LEVOTHYROXINE SODIUM 137 UG/1
TABLET ORAL
COMMUNITY
Start: 2024-02-13

## 2024-02-13 NOTE — LETTER
"    2/13/2024         RE: Lupillo Smith  [address] [place] [address]        Dear Colleague,    Thank you for referring your patient, Lupillo Smith, to the [facility]. Please see a copy of my visit note below.    Subjective:    Established patient    Lupillo Smith is a 58 year old male who presents for hypothyroidism and DM-2.  The following is a comprehensive summary of his endocrine care to date, chart fully reviewed for all endocrinopathies.    # Micro papillary thyroid carcinoma s/p total thyroidectomy in 2013    He had a long-standing history of thyroid nodules and ultimately underwent total thyroidectomy in 2013. Pre-op he did have SOB from compression. Pre-op: no dysphagia, no hoarseness. No prior H/N radiation. No FH of thyroid cancer.     7/15/2013: underwent total thyroidectomy (thyroid was reported as 337 grams of pathology specimen), per [facility] notes the indication for thyroidectomy was an enlarging goiter with tracheal deviation and the micro PTC was an incidental finding. I did read the operative report (Dr. Arnaud Jones) with the following notable findings: \"the gland was massively enlarged\", no mention of any parathyroid gland damage, no mention of gross extrathyroidal extension, no mention of lymphadenopathy.                            FINAL SURGICAL PATHOLOGY REPORT           Pathology #: [id number]                     Date Obtained: 07/15/2013                                                 Date Received: 07/15/2013     DIAGNOSIS:    Thyroid, total thyroidectomy:      PROCEDURE:                         Total thyroidectomy    RECEIVED:                          In formalin    SPECIMEN INTEGRITY:                Intact    SPECIMEN SIZE:     Right lobe:                       13 x 8.5 x 7.5 cm     Left lobe:                        5.8 x 5 x 2.5 cm      SPECIMEN WEIGHT:                   337 g    TUMOR FOCALITY:                    Unifocal      " "DOMINANT TUMOR:    TUMOR LATERALITY:                  Left lobe    TUMOR SIZE:     Greatest dimension:               0.5 cm    HISTOLOGIC TYPE:                   Papillary carcinoma     Variant, specify:                 Follicular variant     Architecture:                     Follicular     Cytomorphology:                   Classical    HISTOLOGIC GRADE:                  G1: Well differentiated    MARGINS:                           Margins uninvolved by carcinoma     Distance of invasive carcinoma to     closest margin:                   6 mm    TUMOR CAPSULE:                     Partially encapsulated    TUMOR CAPSULAR INVASION:           Not identified    LYMPH-VASCULAR INVASION:           Not identified    EXTRATHYROIDAL EXTENSION:          Not identified      PATHOLOGIC STAGING    PRIMARY TUMOR:                     pT1: Tumor size 2 cm or less,                                       limited to thyroid    REGIONAL LYMPH NODES:              pNX: Regional lymph nodes cannot be                                       assessed     Number examined:                  0     Number involved:                  N/A      ADDITIONAL PATHOLOGIC FINDINGS:    Adenomatous nodule(s) or Nodular                                       follicular disease (eg, nodular                                       hyperplasia, goitrous thyroid).                                       Thryoiditis.        No adjuvant I-131 was given.     Second most recent thyroid US done 9/23/2014 at [facility]:  -images not available, per OSH radiology read: RICHARD but the lateral neck was reportedly not examined     Most recent thyroid US done 1/28/2019 at [facility]:   -images not available, per OSH radiology read: \"small, normal-appearing lymph nodes are seen in the left and right neck. None have microcystic change or calcification.\"    12/20/2022: TSH 0.75, [facility] send out lab - Tg Ab <1.8 IU/mL, Tg TM 0.3 ng/mL      3/2/2021: per the lab - Tg level " "\"detected\", \"detected\" means the value is between 0.2 to 0.8 ng/mL, but a numeric value cannot be reported.    2017: Tg TM 0.2 ng/mL, Tg Ab undetectable, TSH 0.45    2014: Tg TM 0.4 ng/mL, Tg Ab undetectable, TSH 12.35 - this was the highest ever Tg TM post-op    2/13/2024: no compressive symptoms. He has not noticed any nodularity in the neck.    3/2022: serum calcium normal (no prior hypercalcemia); he did not have any issues with parathyroid function after surgery     # Hypothyroidism      11/19/2023: reduce levothyroxine to 11 tabs/week    1/2024: TSH 0.4 (normal)    2/13/2024: he continues levothyroxine 137 mcg tabs, 11 tabs/week    # DM-2    Diagnosed with DM: prediabetic range HbA1c since ~2007    History of DKA/HHS: no     FH of DM: paternal grandmother with DM-2     FH of autoimmune disease: no    Glycemic control over the years: excellent     Current DM therapy:  -Metformin XR used for a few years - current dose 1500 mg daily; generally well tolerated   -Mounjaro started 1/2023, increased to 15 mg 4/2023; well tolerated      Prior DM therapy:  -Only metformin and Mounjaro     Current glycemic control: doesn't have a glucometer     Diet: 3 small meals/day, doesn't drink calories outside of milk     Physical activity: home exercise equipment (aerobics) around once weekly, he is a former college football player - [employer]      Tobacco/alcohol use: no    Complications noted in the A/P section.     No prior weight loss surgery/procedure. No prior weight loss medication.    No personal/FH: pancreatitis, pancreatic cancer, MTC, MEN     Objective:    2/13/2024: BMI 40.3 kg/m2, /78. Well healed lower anterior neck thyroidectomy scar. No nodularity in the thyroid bed. No cervical LAD.     6/2023: BMI 40.5 kg/meters squared, blood pressure 126/84. Well healed lower anterior neck thyroidectomy scar. No nodularity in the thyroid bed. No cervical LAD. Radial pulse with a regular rate and rhythm.    12/2022: BMI 46 " kg/m2, /88. No overt Cushingoid features. No thyroid eye disease. Well healed lower anterior neck thyroidectomy scar. No nodularity in the thyroid bed. No cervical LAD. Radial pulse with a regular rate and rhythm. DM foot exam performed and normal.     Assessment/Plan:    # Micro papillary thyroid carcinoma s/p total thyroidectomy in 2013  # Hypothyroidism     I ordered a neck ultrasound, thyroglobulin antibody, and TSH.  He will do the labs in about 3 months.  He will do the neck ultrasound at any point in the next 12 months.  He will let me know via Abcellute when labs and ultrasound are done and I will review.    Prophylactically reduce levothyroxine from 11 tabs per week to 10 tabs per week.  Each tab is 137 mcg.    # DM-2  -1/2024:  mg/dL, HbA1c 5.5%  # No retinopathy on eye exam 1/2023   -He has a local eye exam coming up  # Intermittently elevated blood pressure, hasn't been on an ACE-I/ARB    -10/2023: urine microalbumin normal   -1/2024: GFR >90  # Possible mild peripheral neuropathy   # No prior ASCVD event   # Dyslipidemia, not on ASA or a statin   -1/2024: , TG 92, HDL 48, LDL 97  # Medically complicated obesity     His weight has plateaued and to break through the plateau he will calorie restrict (we reviewed calorie tracking - My Fitness Pal) and continue exercising. No change in DM medications.     We also reviewed starting a statin and we will defer at this time but likely do so down the road.    I prescribed a glucometer, he will check a glucose a few times per week to include fasting, before meals, bedtime, and 2 hours after eating.  Goal fasting and before meals less than 130 mg/deciliter and goal 2 hours after eating less than 150 mg/deciliter.    Return to see me in 1 year with diabetes labs ordered.    20 minutes spent on the date of the encounter doing chart review, history and exam, documentation and further activities as noted above.       Again, thank you for allowing me to  participate in the care of your patient.        Sincerely,        Charan Hodgson MD

## 2024-03-29 DIAGNOSIS — R73.03 PREDIABETES: Primary | ICD-10-CM

## 2024-04-03 DIAGNOSIS — L50.1 CHRONIC IDIOPATHIC URTICARIA: ICD-10-CM

## 2024-04-03 NOTE — TELEPHONE ENCOUNTER
Requested Prescriptions   Pending Prescriptions Disp Refills    hydrOXYzine HCl (ATARAX) 50 MG tablet 90 tablet 3     Sig: Take 1 tablet (50 mg) by mouth daily as needed for itching (hives)       There is no refill protocol information for this order          Last office visit: 3/2/2023 ; last virtual visit: Visit date not found with prescribing provider:  Corey Welsh   Future Office Visit:      Thank you,  Deana ABDI [facility]  [facility]ty [facility] - [facility]

## 2024-04-03 NOTE — TELEPHONE ENCOUNTER
Routing refill request to provider for review/approval because:  Patient needs to be seen because it has been more than 1 year since last office visit.     LOV: 3/2/2023, 1 year follow-up recommended    No appointment scheduled      Loren JANSEN RN  Specialty/Allergy Clinic

## 2024-04-04 RX ORDER — METFORMIN HYDROCHLORIDE 750 MG/1
1500 TABLET, EXTENDED RELEASE ORAL DAILY
Qty: 180 TABLET | Refills: 1 | Status: SHIPPED | OUTPATIENT
Start: 2024-04-04

## 2024-04-04 RX ORDER — HYDROXYZINE HYDROCHLORIDE 50 MG/1
50 TABLET, FILM COATED ORAL DAILY PRN
Qty: 90 TABLET | Refills: 0 | Status: SHIPPED | OUTPATIENT
Start: 2024-04-04 | End: 2024-05-17

## 2024-04-09 NOTE — TELEPHONE ENCOUNTER
Pt has viewed My Chart and has scheduled a follow up appointment.    Christie BARONE RN  Specialty/Allergy Clinics

## 2024-05-06 ENCOUNTER — HOSPITAL ENCOUNTER (OUTPATIENT)
Dept: ULTRASOUND IMAGING | Facility: CLINIC | Age: 59
Discharge: HOME | End: 2024-05-06
Attending: INTERNAL MEDICINE | Admitting: INTERNAL MEDICINE
Payer: COMMERCIAL

## 2024-05-06 DIAGNOSIS — Z85.850 HX OF PAPILLARY THYROID CARCINOMA: ICD-10-CM

## 2024-05-06 DIAGNOSIS — E11.9 TYPE 2 DIABETES MELLITUS WITHOUT COMPLICATION, WITHOUT LONG-TERM CURRENT USE OF INSULIN (H): ICD-10-CM

## 2024-05-06 PROCEDURE — 76536 US EXAM OF HEAD AND NECK: CPT

## 2024-05-07 ENCOUNTER — TELEPHONE (OUTPATIENT)
Dept: ENDOCRINOLOGY | Facility: CLINIC | Age: 59
End: 2024-05-07
Payer: COMMERCIAL

## 2024-05-07 RX ORDER — TIRZEPATIDE 15 MG/.5ML
INJECTION, SOLUTION SUBCUTANEOUS
Qty: 6 ML | Refills: 1 | Status: SHIPPED | OUTPATIENT
Start: 2024-05-07

## 2024-05-07 NOTE — TELEPHONE ENCOUNTER
PA Initiation    Medication: MOUNJARO 15 MG/0.5ML SC SOPN  Insurance Company: [employer] - Phone [phone] Fax [phone]  Pharmacy Filling the Rx:    Filling Pharmacy Phone:    Filling Pharmacy Fax:    Start Date: 5/7/2024    Key:[id number]

## 2024-05-07 NOTE — TELEPHONE ENCOUNTER
Prior Authorization Approval    Medication: MOUNJARO 15 MG/0.5ML SC SOPN  Authorization Effective Date: 4/7/2024  Authorization Expiration Date: 5/7/2025  Approved Dose/Quantity: 2ml/28 days   Reference #: [id number]   Insurance Company: [employer] - Phone [phone] Fax [phone]  Expected CoPay: $ 103.71  CoPay Card Available:      Financial Assistance Needed: no  Which Pharmacy is filling the prescription:    Pharmacy Notified: no  Patient Notified: pharmacy will notify patient

## 2024-05-07 NOTE — TELEPHONE ENCOUNTER
Requested Prescriptions   Pending Prescriptions Disp Refills    MOUNJARO 15 MG/0.5ML pen [Pharmacy Med Name: MOUNJARO 15MG/0.5ML SOPN] 6 mL 3     Sig: INJECT 15 MG UNDER THE SKIN EVERY 7 DAYS, STARTING AFTER 12.5MG WEEKLY FOR 4 WEEKS.       GLP-1 Agonists Protocol Passed - 5/6/2024  9:14 PM        Passed - HgbA1C in past 3 or 6 months     If HgbA1C is 8 or greater, it needs to be on file within the past 3 months.  If less than 8, must be on file within the past 6 months.     Recent Labs   Lab Test 01/29/24  0832   A1C 5.5             Passed - Medication is active on med list        Passed - Has GFR on file in past 12 months and most recent value is normal        Passed - Recent (6 mo) or future (90 days) visit within the authorizing provider's specialty     The patient must have completed an in-person or virtual visit within the past 6 months or has a future visit scheduled within the next 90 days with the authorizing provider s specialty.  Urgent care and e-visits do not quality as an office visit for this protocol.          Passed - Medication indicated for associated diagnosis     Medication is associated with one or more of the following diagnoses:     Type 2 diabetes mellitus           Passed - Patient is age 18 or older

## 2024-05-13 ENCOUNTER — LAB (OUTPATIENT)
Dept: LAB | Facility: CLINIC | Age: 59
End: 2024-05-13
Payer: COMMERCIAL

## 2024-05-13 DIAGNOSIS — Z85.850 HX OF PAPILLARY THYROID CARCINOMA: ICD-10-CM

## 2024-05-13 LAB
Lab: NORMAL
PERFORMING LABORATORY: NORMAL
SPECIMEN STATUS: NORMAL
TEST NAME: NORMAL
TSH SERPL DL<=0.005 MIU/L-ACNC: 0.51 UIU/ML (ref 0.3–4.2)

## 2024-05-13 PROCEDURE — 36415 COLL VENOUS BLD VENIPUNCTURE: CPT

## 2024-05-13 PROCEDURE — 86800 THYROGLOBULIN ANTIBODY: CPT

## 2024-05-13 PROCEDURE — 84443 ASSAY THYROID STIM HORMONE: CPT

## 2024-05-13 PROCEDURE — 84432 ASSAY OF THYROGLOBULIN: CPT | Mod: 90

## 2024-05-13 PROCEDURE — 99000 SPECIMEN HANDLING OFFICE-LAB: CPT

## 2024-05-14 LAB
MAYO MISC RESULT: NORMAL
SCANNED LAB RESULT: ABNORMAL
TEST NAME: ABNORMAL

## 2024-05-17 ENCOUNTER — OFFICE VISIT (OUTPATIENT)
Dept: ALLERGY | Facility: CLINIC | Age: 59
End: 2024-05-17
Payer: COMMERCIAL

## 2024-05-17 VITALS
SYSTOLIC BLOOD PRESSURE: 116 MMHG | OXYGEN SATURATION: 95 % | HEART RATE: 78 BPM | DIASTOLIC BLOOD PRESSURE: 78 MMHG | WEIGHT: 287 LBS | HEIGHT: 71 IN | BODY MASS INDEX: 40.18 KG/M2

## 2024-05-17 DIAGNOSIS — L50.1 CHRONIC IDIOPATHIC URTICARIA: ICD-10-CM

## 2024-05-17 PROCEDURE — 99213 OFFICE O/P EST LOW 20 MIN: CPT | Performed by: ALLERGY & IMMUNOLOGY

## 2024-05-17 RX ORDER — HYDROXYZINE HYDROCHLORIDE 50 MG/1
50 TABLET, FILM COATED ORAL DAILY PRN
Qty: 90 TABLET | Refills: 3 | Status: SHIPPED | OUTPATIENT
Start: 2024-05-17

## 2024-05-17 NOTE — LETTER
5/17/2024         RE: Lupillo Smith  [address] [place] [address]        Dear Colleague,    Thank you for referring your patient, Lupillo Smith, to the [facility]. Please see a copy of my visit note below.    SUBJECTIVE:                                                                   Lupillo Smith presents today to our Allergy Clinic at [facility] for a follow up visit. He is a 58 year old male with a history of chronic idiopathic urticaria.    History of hives that started when he was a teenager. Previously managed by Dr. Garth Lilly, [facility] allergist.    For the first several years, he was on different oral antihistamines which he failed, and ultimately switched to hydroxyzine.  Depending on symptom control, he would be taking hydroxyzine from once a day to several times a day.  Until his mid-30s, he had more issues even when he was taking hydroxyzine several times a day consistently. Things got better with age.   He takes hydroxyzine 50 mg by mouth once daily 5-6 times a week.  If he tries to extend the gap between hydroxyzine to 2 to 3 days, he starts having hives.  He experimented once within the past 12 months, and developed urticaria on his body.  On the other hand, he feels that if he tries taking hydroxyzine every other day, he might be okay.      Patient Active Problem List   Diagnosis     Prediabetes     Hypothyroidism     Urticaria     Hyperlipidemia     Hx of papillary thyroid carcinoma     History of total thyroidectomy     Primary osteoarthritis of right hip     Benign essential hypertension     S/P total hip arthroplasty     Postsurgical hypothyroidism     Class 2 severe obesity due to excess calories with serious comorbidity in adult (H)       Past Medical History:   Diagnosis Date     Arthritis Hips, currently the right hip     Benign essential hypertension 9/1/2020     Cancer (H) Small amount of cancer  detected when i had thyroid     Diabetes mellitus (H) 9/4/2020     Hx of thyroid cancer 2013     Thyroid disease Thyroidectomy July 2013     Urticaria       Problem (# of Occurrences) Relation (Name,Age of Onset)    Diabetes (1) Maternal Grandmother (Zaynab)    Hypertension (1) Father (Uvaldo)           Negative family history of: Glaucoma, Macular Degeneration          Past Surgical History:   Procedure Laterality Date     ARTHROPLASTY HIP Right 9/3/2020    Procedure: ARTHROPLASTY, HIP, TOTAL;  Surgeon: Giuliano Desouza MD;  Location: [facility] OR     BIOPSY  Multiple    Pre-thyroid removal     JOINT REPLACEMENT Left 10/2013    Left hip replacement     [facility] HOME SLEEP TEST/TYPE 3 ADRIAN  6/11/2023          ROTATOR CUFF REPAIR RT/LT Right     9/2002- Right shoulder     THYROIDECTOMY  2013     ZZC ANESTH,DX ARTHROSCOPIC PROC KNEE JOINT Right 04/1985     Social History     Socioeconomic History     Marital status:      Spouse name: None     Number of children: None     Years of education: None     Highest education level: None   Occupational History     Employer: [employer]   Tobacco Use     Smoking status: Some Days     Types: Cigars     Smokeless tobacco: Never     Tobacco comments:     One cigar every three months- very very rare   Vaping Use     Vaping status: Never Used   Substance and Sexual Activity     Alcohol use: Yes     Comment: rare     Drug use: No     Sexual activity: Yes     Partners: Female     Birth control/protection: None   Social History Narrative    May 17, 2024        ENVIRONMENTAL HISTORY: [place] The home is heated with a forced air, wood stove and gas fireplace. They do have central air conditioning. The patient's bedroom is furnished with carpeting in bedroom and fabric window coverings.  Pets inside the house include 2 dogs. There is no history of cockroach or mice infestation. There are no smokers in the house.  The house  does not have a damp basement.         Evette Boston MA     Social Determinants of Health     Financial Resource Strain: Low Risk  (1/21/2024)    Financial Resource Strain      Within the past 12 months, have you or your family members you live with been unable to get utilities (heat, electricity) when it was really needed?: No   Food Insecurity: Low Risk  (1/21/2024)    Food Insecurity      Within the past 12 months, did you worry that your food would run out before you got money to buy more?: No      Within the past 12 months, did the food you bought just not last and you didn t have money to get more?: No   Transportation Needs: Low Risk  (1/21/2024)    Transportation Needs      Within the past 12 months, has lack of transportation kept you from medical appointments, getting your medicines, non-medical meetings or appointments, work, or from getting things that you need?: No   Interpersonal Safety: Low Risk  (1/22/2024)    Interpersonal Safety      Do you feel physically and emotionally safe where you currently live?: Yes      Within the past 12 months, have you been hit, slapped, kicked or otherwise physically hurt by someone?: No      Within the past 12 months, have you been humiliated or emotionally abused in other ways by your partner or ex-partner?: No   Housing Stability: Low Risk  (1/21/2024)    Housing Stability      Do you have housing? : Yes      Are you worried about losing your housing?: No           Current Outpatient Medications:      blood glucose (NO BRAND SPECIFIED) lancets standard, Use to test blood sugar 3 times per week, Disp: 50 Lancet, Rfl: 4     blood glucose (NO BRAND SPECIFIED) test strip, Use to test blood sugar 3 times per week, Disp: 50 strip, Rfl: 4     blood glucose monitoring (NO BRAND SPECIFIED) meter device kit, Use to test blood sugar 1 times daily or as directed., Disp: 1 kit, Rfl: 1     hydrOXYzine HCl (ATARAX) 50 MG tablet, Take 1 tablet (50 mg) by mouth daily as needed  "for itching (hives), Disp: 90 tablet, Rfl: 3     levothyroxine (SYNTHROID/LEVOTHROID) 137 MCG tablet, 10 tablets/week. He will take 2 tablets/day 3 days/week and 1 tab/day the other 4 days of the week., Disp: , Rfl:      metFORMIN (GLUCOPHAGE-XR) 750 MG 24 hr tablet, Take 2 tablets (1,500 mg) by mouth daily, Disp: 180 tablet, Rfl: 1     MOUNJARO 15 MG/0.5ML pen, INJECT 15 MG UNDER THE SKIN EVERY 7 DAYS, STARTING AFTER 12.5MG WEEKLY FOR 4 WEEKS., Disp: 6 mL, Rfl: 1    Current Facility-Administered Medications:      ropivacaine (NAROPIN) injection 3 mL, 3 mL, , , Maximiliano Bowden DO, 3 mL at 07/30/20 0830     triamcinolone (KENALOG-40) injection 40 mg, 40 mg, , , Maximiliano Bowden DO, 40 mg at 07/30/20 0830  Immunization History   Administered Date(s) Administered     COVID-19 Monovalent 18+ (Moderna) 04/05/2021, 05/03/2021, 01/31/2022     Pneumo Conj 13-V (2010&after) 08/17/2020     TDAP (Adacel,Boostrix) 06/28/2007     TDAP Vaccine (Adacel) 08/17/2020     Allergies   Allergen Reactions     Oxycodone Nausea and Vomiting     Aspirin Hives     Ibuprofen Hives     Nsaids Swelling     OBJECTIVE:                                                                 /78   Pulse 78   Ht 1.816 m (5' 11.5\")   Wt 130.2 kg (287 lb)   SpO2 95%   BMI 39.47 kg/m          Physical Exam  Vitals and nursing note reviewed.   Constitutional:       General: He is not in acute distress.     Appearance: He is obese. He is not diaphoretic.   HENT:      Head: Normocephalic and atraumatic.      Right Ear: Tympanic membrane, ear canal and external ear normal.      Left Ear: Tympanic membrane, ear canal and external ear normal.      Nose: No mucosal edema or rhinorrhea.      Right Turbinates: Not enlarged, swollen or pale.      Left Turbinates: Not enlarged, swollen or pale.      Mouth/Throat:      Lips: Pink.      Mouth: Mucous membranes are moist.      Pharynx: Oropharynx is clear. No pharyngeal swelling, oropharyngeal " exudate or posterior oropharyngeal erythema.   Eyes:      General:         Right eye: No discharge.         Left eye: No discharge.      Conjunctiva/sclera: Conjunctivae normal.   Pulmonary:      Effort: Pulmonary effort is normal. No respiratory distress.      Breath sounds: Normal breath sounds and air entry. No stridor, decreased air movement or transmitted upper airway sounds. No decreased breath sounds, wheezing, rhonchi or rales.   Musculoskeletal:         General: Normal range of motion.   Skin:     General: Skin is warm.      Comments: No urticaria or angioedema on today's exam.   Neurological:      Mental Status: He is alert and oriented to person, place, and time.   Psychiatric:         Mood and Affect: Mood normal.         Behavior: Behavior normal.       ASSESSMENT/PLAN:      Chronic idiopathic urticaria    The patient's urticaria is well-controlled with hydroxyzine 50 mg taken by mouth once daily. He reports developing hives if he discontinues hydroxyzine for 3 days. We discussed the potential risks of continuous use of first-generation H1 antihistamines, including their link to Alzheimer's disease and dementia.    We agreed on the following plan:    Try taking hydroxyzine every other day.  Alternatively, cut the pill and take 25 mg by mouth once daily instead of 50 mg.    - hydrOXYzine HCl (ATARAX) 50 MG tablet  Dispense: 90 tablet; Refill: 3       Follow up in 12 months or sooner if needed.    Thank you for allowing us to participate in the care of this patient. Please feel free to contact us if there are any questions or concerns about the patient.    Disclaimer: This note consists of symbols derived from keyboarding, dictation and/or voice recognition software. As a result, there may be errors in the script that have gone undetected. Please consider this when interpreting information found in this chart.    Corey Welsh MD, FAAAAI, FACAAI  Allergy, Asthma and Immunology     [facility]  [facility]        Again, thank you for allowing me to participate in the care of your patient.        Sincerely,        Corey Welsh MD

## 2024-05-17 NOTE — H&P (VIEW-ONLY)
SUBJECTIVE:                                                                   Lupillo Smith presents today to our Allergy Clinic at [facility] for a follow up visit. He is a 58 year old male with a history of chronic idiopathic urticaria.    History of hives that started when he was a teenager. Previously managed by Dr. Garth Lilly, [facility] allergist.    For the first several years, he was on different oral antihistamines which he failed, and ultimately switched to hydroxyzine.  Depending on symptom control, he would be taking hydroxyzine from once a day to several times a day.  Until his mid-30s, he had more issues even when he was taking hydroxyzine several times a day consistently. Things got better with age.   He takes hydroxyzine 50 mg by mouth once daily 5-6 times a week.  If he tries to extend the gap between hydroxyzine to 2 to 3 days, he starts having hives.  He experimented once within the past 12 months, and developed urticaria on his body.  On the other hand, he feels that if he tries taking hydroxyzine every other day, he might be okay.      Patient Active Problem List   Diagnosis    Prediabetes    Hypothyroidism    Urticaria    Hyperlipidemia    Hx of papillary thyroid carcinoma    History of total thyroidectomy    Primary osteoarthritis of right hip    Benign essential hypertension    S/P total hip arthroplasty    Postsurgical hypothyroidism    Class 2 severe obesity due to excess calories with serious comorbidity in adult (H)       Past Medical History:   Diagnosis Date    Arthritis Hips, currently the right hip    Benign essential hypertension 9/1/2020    Cancer (H) Small amount of cancer detected when i had thyroid    Diabetes mellitus (H) 9/4/2020    Hx of thyroid cancer 2013    Thyroid disease Thyroidectomy July 2013    Urticaria       Problem (# of Occurrences) Relation (Name,Age of Onset)    Diabetes (1) Maternal Grandmother (Zaynab)    Hypertension (1)  Father (Uvaldo)           Negative family history of: Glaucoma, Macular Degeneration          Past Surgical History:   Procedure Laterality Date    ARTHROPLASTY HIP Right 9/3/2020    Procedure: ARTHROPLASTY, HIP, TOTAL;  Surgeon: Giuliano Desouza MD;  Location: [facility] OR    BIOPSY  Multiple    Pre-thyroid removal    JOINT REPLACEMENT Left 10/2013    Left hip replacement    [facility] HOME SLEEP TEST/TYPE 3 ADRIAN  6/11/2023         ROTATOR CUFF REPAIR RT/LT Right     9/2002- Right shoulder    THYROIDECTOMY  2013    ZZC ANESTH,DX ARTHROSCOPIC PROC KNEE JOINT Right 04/1985     Social History     Socioeconomic History    Marital status:      Spouse name: None    Number of children: None    Years of education: None    Highest education level: None   Occupational History     Employer: [employer]   Tobacco Use    Smoking status: Some Days     Types: Cigars    Smokeless tobacco: Never    Tobacco comments:     One cigar every three months- very very rare   Vaping Use    Vaping status: Never Used   Substance and Sexual Activity    Alcohol use: Yes     Comment: rare    Drug use: No    Sexual activity: Yes     Partners: Female     Birth control/protection: None   Social History Narrative    May 17, 2024        ENVIRONMENTAL HISTORY: [place] The home is heated with a forced air, wood stove and gas fireplace. They do have central air conditioning. The patient's bedroom is furnished with carpeting in bedroom and fabric window coverings.  Pets inside the house include 2 dogs. There is no history of cockroach or mice infestation. There are no smokers in the house.  The house does not have a damp basement.         Evette Boston MA     Social Determinants of Health     Financial Resource Strain: Low Risk  (1/21/2024)    Financial Resource Strain     Within the past 12 months, have you or your family members you live with been unable to get utilities (heat,  electricity) when it was really needed?: No   Food Insecurity: Low Risk  (1/21/2024)    Food Insecurity     Within the past 12 months, did you worry that your food would run out before you got money to buy more?: No     Within the past 12 months, did the food you bought just not last and you didn t have money to get more?: No   Transportation Needs: Low Risk  (1/21/2024)    Transportation Needs     Within the past 12 months, has lack of transportation kept you from medical appointments, getting your medicines, non-medical meetings or appointments, work, or from getting things that you need?: No   Interpersonal Safety: Low Risk  (1/22/2024)    Interpersonal Safety     Do you feel physically and emotionally safe where you currently live?: Yes     Within the past 12 months, have you been hit, slapped, kicked or otherwise physically hurt by someone?: No     Within the past 12 months, have you been humiliated or emotionally abused in other ways by your partner or ex-partner?: No   Housing Stability: Low Risk  (1/21/2024)    Housing Stability     Do you have housing? : Yes     Are you worried about losing your housing?: No           Current Outpatient Medications:     blood glucose (NO BRAND SPECIFIED) lancets standard, Use to test blood sugar 3 times per week, Disp: 50 Lancet, Rfl: 4    blood glucose (NO BRAND SPECIFIED) test strip, Use to test blood sugar 3 times per week, Disp: 50 strip, Rfl: 4    blood glucose monitoring (NO BRAND SPECIFIED) meter device kit, Use to test blood sugar 1 times daily or as directed., Disp: 1 kit, Rfl: 1    hydrOXYzine HCl (ATARAX) 50 MG tablet, Take 1 tablet (50 mg) by mouth daily as needed for itching (hives), Disp: 90 tablet, Rfl: 3    levothyroxine (SYNTHROID/LEVOTHROID) 137 MCG tablet, 10 tablets/week. He will take 2 tablets/day 3 days/week and 1 tab/day the other 4 days of the week., Disp: , Rfl:     metFORMIN (GLUCOPHAGE-XR) 750 MG 24 hr tablet, Take 2 tablets (1,500 mg) by mouth  "daily, Disp: 180 tablet, Rfl: 1    MOUNJARO 15 MG/0.5ML pen, INJECT 15 MG UNDER THE SKIN EVERY 7 DAYS, STARTING AFTER 12.5MG WEEKLY FOR 4 WEEKS., Disp: 6 mL, Rfl: 1    Current Facility-Administered Medications:     ropivacaine (NAROPIN) injection 3 mL, 3 mL, , , Maximiliano Bowden DO, 3 mL at 07/30/20 0830    triamcinolone (KENALOG-40) injection 40 mg, 40 mg, , , Maximiliano Bowden, DO, 40 mg at 07/30/20 0830  Immunization History   Administered Date(s) Administered    COVID-19 Monovalent 18+ (Moderna) 04/05/2021, 05/03/2021, 01/31/2022    Pneumo Conj 13-V (2010&after) 08/17/2020    TDAP (Adacel,Boostrix) 06/28/2007    TDAP Vaccine (Adacel) 08/17/2020     Allergies   Allergen Reactions    Oxycodone Nausea and Vomiting    Aspirin Hives    Ibuprofen Hives    Nsaids Swelling     OBJECTIVE:                                                                 /78   Pulse 78   Ht 1.816 m (5' 11.5\")   Wt 130.2 kg (287 lb)   SpO2 95%   BMI 39.47 kg/m          Physical Exam  Vitals and nursing note reviewed.   Constitutional:       General: He is not in acute distress.     Appearance: He is obese. He is not diaphoretic.   HENT:      Head: Normocephalic and atraumatic.      Right Ear: Tympanic membrane, ear canal and external ear normal.      Left Ear: Tympanic membrane, ear canal and external ear normal.      Nose: No mucosal edema or rhinorrhea.      Right Turbinates: Not enlarged, swollen or pale.      Left Turbinates: Not enlarged, swollen or pale.      Mouth/Throat:      Lips: Pink.      Mouth: Mucous membranes are moist.      Pharynx: Oropharynx is clear. No pharyngeal swelling, oropharyngeal exudate or posterior oropharyngeal erythema.   Eyes:      General:         Right eye: No discharge.         Left eye: No discharge.      Conjunctiva/sclera: Conjunctivae normal.   Pulmonary:      Effort: Pulmonary effort is normal. No respiratory distress.      Breath sounds: Normal breath sounds and air entry. No " stridor, decreased air movement or transmitted upper airway sounds. No decreased breath sounds, wheezing, rhonchi or rales.   Musculoskeletal:         General: Normal range of motion.   Skin:     General: Skin is warm.      Comments: No urticaria or angioedema on today's exam.   Neurological:      Mental Status: He is alert and oriented to person, place, and time.   Psychiatric:         Mood and Affect: Mood normal.         Behavior: Behavior normal.       ASSESSMENT/PLAN:      Chronic idiopathic urticaria    The patient's urticaria is well-controlled with hydroxyzine 50 mg taken by mouth once daily. He reports developing hives if he discontinues hydroxyzine for 3 days. We discussed the potential risks of continuous use of first-generation H1 antihistamines, including their link to Alzheimer's disease and dementia.    We agreed on the following plan:    Try taking hydroxyzine every other day.  Alternatively, cut the pill and take 25 mg by mouth once daily instead of 50 mg.    - hydrOXYzine HCl (ATARAX) 50 MG tablet  Dispense: 90 tablet; Refill: 3       Follow up in 12 months or sooner if needed.    Thank you for allowing us to participate in the care of this patient. Please feel free to contact us if there are any questions or concerns about the patient.    Disclaimer: This note consists of symbols derived from keyboarding, dictation and/or voice recognition software. As a result, there may be errors in the script that have gone undetected. Please consider this when interpreting information found in this chart.    Corey Welsh MD, FAAAAI, FACAAI  Allergy, Asthma and Immunology     [facility]

## 2024-05-17 NOTE — NURSING NOTE
Chief Complaint   Patient presents with    Allergies     Patient reports no allergie sx within the past 3 weeks,patient has no concerns.       There were no vitals filed for this visit.  Wt Readings from Last 1 Encounters:   02/13/24 132.9 kg (293 lb)       Evette Boston MA

## 2024-05-17 NOTE — PROGRESS NOTES
SUBJECTIVE:                                                                   Lupillo Smith presents today to our Allergy Clinic at [facility] for a follow up visit. He is a 58 year old male with a history of chronic idiopathic urticaria.    History of hives that started when he was a teenager. Previously managed by Dr. Garth Lilly, [facility] allergist.    For the first several years, he was on different oral antihistamines which he failed, and ultimately switched to hydroxyzine.  Depending on symptom control, he would be taking hydroxyzine from once a day to several times a day.  Until his mid-30s, he had more issues even when he was taking hydroxyzine several times a day consistently. Things got better with age.   He takes hydroxyzine 50 mg by mouth once daily 5-6 times a week.  If he tries to extend the gap between hydroxyzine to 2 to 3 days, he starts having hives.  He experimented once within the past 12 months, and developed urticaria on his body.  On the other hand, he feels that if he tries taking hydroxyzine every other day, he might be okay.      Patient Active Problem List   Diagnosis    Prediabetes    Hypothyroidism    Urticaria    Hyperlipidemia    Hx of papillary thyroid carcinoma    History of total thyroidectomy    Primary osteoarthritis of right hip    Benign essential hypertension    S/P total hip arthroplasty    Postsurgical hypothyroidism    Class 2 severe obesity due to excess calories with serious comorbidity in adult (H)       Past Medical History:   Diagnosis Date    Arthritis Hips, currently the right hip    Benign essential hypertension 9/1/2020    Cancer (H) Small amount of cancer detected when i had thyroid    Diabetes mellitus (H) 9/4/2020    Hx of thyroid cancer 2013    Thyroid disease Thyroidectomy July 2013    Urticaria       Problem (# of Occurrences) Relation (Name,Age of Onset)    Diabetes (1) Maternal Grandmother (Zaynab)    Hypertension (1)  Father (Uvaldo)           Negative family history of: Glaucoma, Macular Degeneration          Past Surgical History:   Procedure Laterality Date    ARTHROPLASTY HIP Right 9/3/2020    Procedure: ARTHROPLASTY, HIP, TOTAL;  Surgeon: Giuliano Desouza MD;  Location: [facility] OR    BIOPSY  Multiple    Pre-thyroid removal    JOINT REPLACEMENT Left 10/2013    Left hip replacement    [place] HOME SLEEP TEST/TYPE 3 ADRIAN  6/11/2023         ROTATOR CUFF REPAIR RT/LT Right     9/2002- Right shoulder    THYROIDECTOMY  2013    ZZC ANESTH,DX ARTHROSCOPIC PROC KNEE JOINT Right 04/1985     Social History     Socioeconomic History    Marital status:      Spouse name: None    Number of children: None    Years of education: None    Highest education level: None   Occupational History     Employer: [employer]   Tobacco Use    Smoking status: Some Days     Types: Cigars    Smokeless tobacco: Never    Tobacco comments:     One cigar every three months- very very rare   Vaping Use    Vaping status: Never Used   Substance and Sexual Activity    Alcohol use: Yes     Comment: rare    Drug use: No    Sexual activity: Yes     Partners: Female     Birth control/protection: None   Social History Narrative    May 17, 2024        ENVIRONMENTAL HISTORY: [place] The home is heated with a forced air, wood stove and gas fireplace. They do have central air conditioning. The patient's bedroom is furnished with carpeting in bedroom and fabric window coverings.  Pets inside the house include 2 dogs. There is no history of cockroach or mice infestation. There are no smokers in the house.  The house does not have a damp basement.         Evette Boston MA     Social Determinants of Health     Financial Resource Strain: Low Risk  (1/21/2024)    Financial Resource Strain     Within the past 12 months, have you or your family members you live with been unable to get utilities (heat,  electricity) when it was really needed?: No   Food Insecurity: Low Risk  (1/21/2024)    Food Insecurity     Within the past 12 months, did you worry that your food would run out before you got money to buy more?: No     Within the past 12 months, did the food you bought just not last and you didn t have money to get more?: No   Transportation Needs: Low Risk  (1/21/2024)    Transportation Needs     Within the past 12 months, has lack of transportation kept you from medical appointments, getting your medicines, non-medical meetings or appointments, work, or from getting things that you need?: No   Interpersonal Safety: Low Risk  (1/22/2024)    Interpersonal Safety     Do you feel physically and emotionally safe where you currently live?: Yes     Within the past 12 months, have you been hit, slapped, kicked or otherwise physically hurt by someone?: No     Within the past 12 months, have you been humiliated or emotionally abused in other ways by your partner or ex-partner?: No   Housing Stability: Low Risk  (1/21/2024)    Housing Stability     Do you have housing? : Yes     Are you worried about losing your housing?: No           Current Outpatient Medications:     blood glucose (NO BRAND SPECIFIED) lancets standard, Use to test blood sugar 3 times per week, Disp: 50 Lancet, Rfl: 4    blood glucose (NO BRAND SPECIFIED) test strip, Use to test blood sugar 3 times per week, Disp: 50 strip, Rfl: 4    blood glucose monitoring (NO BRAND SPECIFIED) meter device kit, Use to test blood sugar 1 times daily or as directed., Disp: 1 kit, Rfl: 1    hydrOXYzine HCl (ATARAX) 50 MG tablet, Take 1 tablet (50 mg) by mouth daily as needed for itching (hives), Disp: 90 tablet, Rfl: 3    levothyroxine (SYNTHROID/LEVOTHROID) 137 MCG tablet, 10 tablets/week. He will take 2 tablets/day 3 days/week and 1 tab/day the other 4 days of the week., Disp: , Rfl:     metFORMIN (GLUCOPHAGE-XR) 750 MG 24 hr tablet, Take 2 tablets (1,500 mg) by mouth  "daily, Disp: 180 tablet, Rfl: 1    MOUNJARO 15 MG/0.5ML pen, INJECT 15 MG UNDER THE SKIN EVERY 7 DAYS, STARTING AFTER 12.5MG WEEKLY FOR 4 WEEKS., Disp: 6 mL, Rfl: 1    Current Facility-Administered Medications:     ropivacaine (NAROPIN) injection 3 mL, 3 mL, , , Maximiliano Bowden DO, 3 mL at 07/30/20 0830    triamcinolone (KENALOG-40) injection 40 mg, 40 mg, , , Maximiliano Bowden, DO, 40 mg at 07/30/20 0830  Immunization History   Administered Date(s) Administered    COVID-19 Monovalent 18+ (Moderna) 04/05/2021, 05/03/2021, 01/31/2022    Pneumo Conj 13-V (2010&after) 08/17/2020    TDAP (Adacel,Boostrix) 06/28/2007    TDAP Vaccine (Adacel) 08/17/2020     Allergies   Allergen Reactions    Oxycodone Nausea and Vomiting    Aspirin Hives    Ibuprofen Hives    Nsaids Swelling     OBJECTIVE:                                                                 /78   Pulse 78   Ht 1.816 m (5' 11.5\")   Wt 130.2 kg (287 lb)   SpO2 95%   BMI 39.47 kg/m          Physical Exam  Vitals and nursing note reviewed.   Constitutional:       General: He is not in acute distress.     Appearance: He is obese. He is not diaphoretic.   HENT:      Head: Normocephalic and atraumatic.      Right Ear: Tympanic membrane, ear canal and external ear normal.      Left Ear: Tympanic membrane, ear canal and external ear normal.      Nose: No mucosal edema or rhinorrhea.      Right Turbinates: Not enlarged, swollen or pale.      Left Turbinates: Not enlarged, swollen or pale.      Mouth/Throat:      Lips: Pink.      Mouth: Mucous membranes are moist.      Pharynx: Oropharynx is clear. No pharyngeal swelling, oropharyngeal exudate or posterior oropharyngeal erythema.   Eyes:      General:         Right eye: No discharge.         Left eye: No discharge.      Conjunctiva/sclera: Conjunctivae normal.   Pulmonary:      Effort: Pulmonary effort is normal. No respiratory distress.      Breath sounds: Normal breath sounds and air entry. No " stridor, decreased air movement or transmitted upper airway sounds. No decreased breath sounds, wheezing, rhonchi or rales.   Musculoskeletal:         General: Normal range of motion.   Skin:     General: Skin is warm.      Comments: No urticaria or angioedema on today's exam.   Neurological:      Mental Status: He is alert and oriented to person, place, and time.   Psychiatric:         Mood and Affect: Mood normal.         Behavior: Behavior normal.       ASSESSMENT/PLAN:      Chronic idiopathic urticaria    The patient's urticaria is well-controlled with hydroxyzine 50 mg taken by mouth once daily. He reports developing hives if he discontinues hydroxyzine for 3 days. We discussed the potential risks of continuous use of first-generation H1 antihistamines, including their link to Alzheimer's disease and dementia.    We agreed on the following plan:    Try taking hydroxyzine every other day.  Alternatively, cut the pill and take 25 mg by mouth once daily instead of 50 mg.    - hydrOXYzine HCl (ATARAX) 50 MG tablet  Dispense: 90 tablet; Refill: 3       Follow up in 12 months or sooner if needed.    Thank you for allowing us to participate in the care of this patient. Please feel free to contact us if there are any questions or concerns about the patient.    Disclaimer: This note consists of symbols derived from keyboarding, dictation and/or voice recognition software. As a result, there may be errors in the script that have gone undetected. Please consider this when interpreting information found in this chart.    Corey Welsh MD, FAAAAI, FACAAI  Allergy, Asthma and Immunology     [facility]

## 2024-05-20 NOTE — TELEPHONE ENCOUNTER
Panel Management Review   One phone call and send letter if unable to reach them or LeBUZZhart message and send letter if not read after 2 weeks (You will get a message to your inbasket)      BP Readings from Last 1 Encounters:   09/11/18 (!) 153/110    ,   Lab Results   Component Value Date    A1C 6.2 08/29/2018   , Visit date not found    Fail List measure: Colon cancer screening          Patient is due/failing the following:   COLONOSCOPY    Action needed:   Schedule colonoscopy    Type of outreach:    Phone, left message for patient to call back.  and Sent letter.     Questions for provider review:    None                                                                                                                                    Bartolome Neil      Chart routed to NA .              
Patient called back and would like to continue seeing us at [place]. Patient agreed to schedule an physical appointment with the [facility] or other closer to =his home for establishing care and physical to go over routine health maintenance tests due such as colon cancer screening. Patient transferred to scheduled.    Bartolome Neil CMA    
39 yr old female with hx of arthritis presents to ed c/o b/l wrist pain (worse at night), myalgia, foot pain, fatigue x 2 weeks. pt works as cleaning lady. no direct trauma, no fever, no rash. tried tylenol w/o relieve. no dysuria.

## 2024-05-30 ENCOUNTER — ANESTHESIA EVENT (OUTPATIENT)
Dept: GASTROENTEROLOGY | Facility: CLINIC | Age: 59
End: 2024-05-30
Payer: COMMERCIAL

## 2024-05-30 ASSESSMENT — LIFESTYLE VARIABLES: TOBACCO_USE: 1

## 2024-05-30 NOTE — ANESTHESIA PREPROCEDURE EVALUATION
Anesthesia Pre-Procedure Evaluation    Patient: Lupillo Smith   MRN: [id number] : 1965        Procedure : Procedure(s):  Colonoscopy          Past Medical History:   Diagnosis Date     Arthritis Hips, currently the right hip     Benign essential hypertension 2020     Cancer (H) Small amount of cancer detected when i had thyroid     Diabetes mellitus (H) 2020     Hx of thyroid cancer      Thyroid disease Thyroidectomy 2013     Urticaria       Past Surgical History:   Procedure Laterality Date     ARTHROPLASTY HIP Right 9/3/2020    Procedure: ARTHROPLASTY, HIP, TOTAL;  Surgeon: Giuliano Desouza MD;  Location: [facility] OR     BIOPSY  Multiple    Pre-thyroid removal     JOINT REPLACEMENT Left 10/2013    Left hip replacement     [place] HOME SLEEP TEST/TYPE 3 ADRIAN  2023          ROTATOR CUFF REPAIR RT/LT Right     2002- Right shoulder     THYROIDECTOMY       ZZC ANESTH,DX ARTHROSCOPIC PROC KNEE JOINT Right 1985      Allergies   Allergen Reactions     Oxycodone Nausea and Vomiting     Aspirin Hives     Ibuprofen Hives     Nsaids Swelling      Social History     Tobacco Use     Smoking status: Some Days     Types: Cigars     Smokeless tobacco: Never     Tobacco comments:     One cigar every three months- very very rare   Substance Use Topics     Alcohol use: Yes     Comment: rare      Wt Readings from Last 1 Encounters:   24 130.2 kg (287 lb)        Anesthesia Evaluation   Pt has had prior anesthetic. Type: General, MAC and Regional.        ROS/MED HX  ENT/Pulmonary:     (+)     GLENN risk factors,  hypertension, obese,        tobacco use, Current use,                       Neurologic:       Cardiovascular:     (+) Dyslipidemia hypertension- -   -  - -                                      METS/Exercise Tolerance: >4 METS    Hematologic:       Musculoskeletal:   (+)  arthritis,             GI/Hepatic:       Renal/Genitourinary:     (+)        BPH,      Endo: Comment: Morbid  "obesity, h/o total thyroidectomy     (+)  type II DM,   Not using insulin, - not using insulin pump.    thyroid problem, hypothyroidism,    Obesity,       Psychiatric/Substance Use:       Infectious Disease:       Malignancy:   (+) Malignancy, History of Other.Other CA thyroid status post.    Other:            Physical Exam    Airway  airway exam normal      Mallampati: II   TM distance: > 3 FB   Neck ROM: full   Mouth opening: > 3 cm    Respiratory Devices and Support         Dental  no notable dental history     (+) Minor Abnormalities - some fillings, tiny chips      Cardiovascular   cardiovascular exam normal          Pulmonary   pulmonary exam normal            OUTSIDE LABS:  CBC:   Lab Results   Component Value Date    WBC 7.6 08/25/2020    WBC 9.0 08/29/2018    HGB 13.9 09/04/2020    HGB 14.1 08/25/2020    HCT 43.5 08/25/2020    HCT 44.6 08/29/2018     08/25/2020     08/29/2018     BMP:   Lab Results   Component Value Date     01/29/2024     03/10/2022    POTASSIUM 3.8 01/29/2024    POTASSIUM 4.2 03/10/2022    CHLORIDE 103 01/29/2024    CHLORIDE 106 03/10/2022    CO2 24 01/29/2024    CO2 32 03/10/2022    BUN 13.5 01/29/2024    BUN 17 03/10/2022    CR 0.82 01/29/2024    CR 0.95 10/09/2023     (H) 01/29/2024     (H) 01/29/2024     COAGS: No results found for: \"PTT\", \"INR\", \"FIBR\"  POC:   Lab Results   Component Value Date     (H) 09/04/2020     HEPATIC:   Lab Results   Component Value Date    ALBUMIN 4.4 01/29/2024    PROTTOTAL 7.8 08/29/2018    ALT 26 08/29/2018    AST 15 08/29/2018    ALKPHOS 78 08/29/2018    BILITOTAL 0.5 08/29/2018     OTHER:   Lab Results   Component Value Date    A1C 5.5 01/29/2024    ASHLEE 8.8 01/29/2024    PHOS 3.1 01/29/2024    LIPASE 175 08/29/2018    TSH 0.51 05/13/2024    T4 1.98 (H) 10/09/2023       Anesthesia Plan    ASA Status:  3    NPO Status:  NPO Appropriate    Anesthesia Type: General.   Induction: Propofol, Intravenous. " "  Maintenance: TIVA.        Consents    Anesthesia Plan(s) and associated risks, benefits, and realistic alternatives discussed. Questions answered and patient/representative(s) expressed understanding.     - Discussed: Risks, Benefits and Alternatives for BOTH SEDATION and the PROCEDURE were discussed     - Discussed with:  Patient            Postoperative Care    Pain management: Multi-modal analgesia, IV analgesics, Oral pain medications.   PONV prophylaxis: Ondansetron (or other 5HT-3), Dexamethasone or Solumedrol, Background Propofol Infusion     Comments:               CRYSTAL Rossi CRNA    I have reviewed the pertinent notes and labs in the chart from the past 30 days and (re)examined the patient.  Any updates or changes from those notes are reflected in this note.              # Obesity: Estimated body mass index is 39.47 kg/m  as calculated from the following:    Height as of 5/17/24: 1.816 m (5' 11.5\").    Weight as of 5/17/24: 130.2 kg (287 lb).      "

## 2024-06-03 ENCOUNTER — HOSPITAL ENCOUNTER (OUTPATIENT)
Facility: CLINIC | Age: 59
Discharge: HOME | End: 2024-06-03
Attending: SURGERY | Admitting: SURGERY
Payer: COMMERCIAL

## 2024-06-03 ENCOUNTER — ANESTHESIA (OUTPATIENT)
Dept: GASTROENTEROLOGY | Facility: CLINIC | Age: 59
End: 2024-06-03
Payer: COMMERCIAL

## 2024-06-03 VITALS
BODY MASS INDEX: 40.18 KG/M2 | WEIGHT: 287 LBS | SYSTOLIC BLOOD PRESSURE: 126 MMHG | OXYGEN SATURATION: 97 % | DIASTOLIC BLOOD PRESSURE: 80 MMHG | RESPIRATION RATE: 16 BRPM | HEART RATE: 69 BPM | HEIGHT: 71 IN

## 2024-06-03 LAB
COLONOSCOPY: NORMAL
GLUCOSE BLDC GLUCOMTR-MCNC: 101 MG/DL (ref 70–99)

## 2024-06-03 PROCEDURE — 45385 COLONOSCOPY W/LESION REMOVAL: CPT | Performed by: SURGERY

## 2024-06-03 PROCEDURE — 258N000003 HC RX IP 258 OP 636: Performed by: PHYSICIAN ASSISTANT

## 2024-06-03 PROCEDURE — 88305 TISSUE EXAM BY PATHOLOGIST: CPT | Mod: TC | Performed by: SURGERY

## 2024-06-03 PROCEDURE — 370N000017 HC ANESTHESIA TECHNICAL FEE, PER MIN: Performed by: SURGERY

## 2024-06-03 PROCEDURE — 88305 TISSUE EXAM BY PATHOLOGIST: CPT | Mod: 26 | Performed by: PATHOLOGY

## 2024-06-03 PROCEDURE — 82962 GLUCOSE BLOOD TEST: CPT

## 2024-06-03 PROCEDURE — 250N000009 HC RX 250: Performed by: NURSE ANESTHETIST, CERTIFIED REGISTERED

## 2024-06-03 PROCEDURE — 250N000011 HC RX IP 250 OP 636: Performed by: NURSE ANESTHETIST, CERTIFIED REGISTERED

## 2024-06-03 RX ORDER — NALOXONE HYDROCHLORIDE 0.4 MG/ML
0.1 INJECTION, SOLUTION INTRAMUSCULAR; INTRAVENOUS; SUBCUTANEOUS
Status: DISCONTINUED | OUTPATIENT
Start: 2024-06-03 | End: 2024-06-03 | Stop reason: HOSPADM

## 2024-06-03 RX ORDER — ALBUTEROL SULFATE 0.83 MG/ML
2.5 SOLUTION RESPIRATORY (INHALATION) EVERY 4 HOURS PRN
Status: DISCONTINUED | OUTPATIENT
Start: 2024-06-03 | End: 2024-06-03 | Stop reason: HOSPADM

## 2024-06-03 RX ORDER — LIDOCAINE HYDROCHLORIDE 20 MG/ML
INJECTION, SOLUTION INFILTRATION; PERINEURAL PRN
Status: DISCONTINUED | OUTPATIENT
Start: 2024-06-03 | End: 2024-06-03

## 2024-06-03 RX ORDER — LIDOCAINE 40 MG/G
CREAM TOPICAL
Status: DISCONTINUED | OUTPATIENT
Start: 2024-06-03 | End: 2024-06-03 | Stop reason: HOSPADM

## 2024-06-03 RX ORDER — PROPOFOL 10 MG/ML
INJECTION, EMULSION INTRAVENOUS CONTINUOUS PRN
Status: DISCONTINUED | OUTPATIENT
Start: 2024-06-03 | End: 2024-06-03

## 2024-06-03 RX ORDER — SODIUM CHLORIDE, SODIUM LACTATE, POTASSIUM CHLORIDE, CALCIUM CHLORIDE 600; 310; 30; 20 MG/100ML; MG/100ML; MG/100ML; MG/100ML
INJECTION, SOLUTION INTRAVENOUS CONTINUOUS
Status: DISCONTINUED | OUTPATIENT
Start: 2024-06-03 | End: 2024-06-03 | Stop reason: HOSPADM

## 2024-06-03 RX ORDER — PROPOFOL 10 MG/ML
INJECTION, EMULSION INTRAVENOUS PRN
Status: DISCONTINUED | OUTPATIENT
Start: 2024-06-03 | End: 2024-06-03

## 2024-06-03 RX ORDER — ONDANSETRON 4 MG/1
4 TABLET, ORALLY DISINTEGRATING ORAL EVERY 30 MIN PRN
Status: DISCONTINUED | OUTPATIENT
Start: 2024-06-03 | End: 2024-06-03 | Stop reason: HOSPADM

## 2024-06-03 RX ORDER — ONDANSETRON 2 MG/ML
4 INJECTION INTRAMUSCULAR; INTRAVENOUS EVERY 30 MIN PRN
Status: DISCONTINUED | OUTPATIENT
Start: 2024-06-03 | End: 2024-06-03 | Stop reason: HOSPADM

## 2024-06-03 RX ADMIN — PROPOFOL 175 MCG/KG/MIN: 10 INJECTION, EMULSION INTRAVENOUS at 13:00

## 2024-06-03 RX ADMIN — PROPOFOL 130 MG: 10 INJECTION, EMULSION INTRAVENOUS at 13:00

## 2024-06-03 RX ADMIN — SODIUM CHLORIDE, POTASSIUM CHLORIDE, SODIUM LACTATE AND CALCIUM CHLORIDE: 600; 310; 30; 20 INJECTION, SOLUTION INTRAVENOUS at 12:53

## 2024-06-03 RX ADMIN — LIDOCAINE HYDROCHLORIDE 100 MG: 20 INJECTION, SOLUTION INFILTRATION; PERINEURAL at 13:00

## 2024-06-03 ASSESSMENT — ACTIVITIES OF DAILY LIVING (ADL)
ADLS_ACUITY_SCORE: 38
ADLS_ACUITY_SCORE: 38

## 2024-06-03 NOTE — LETTER
Lupillo Smith  [address] [place] [address]    Guillermina 10, 2024    Dear Lupillo,  This letter is written to inform you of the results of your recent colonoscopy.  Your examination showed polyp(s) in your descending colon. All polyps were removed in their entirety and sent for review by a pathologist. As you will see on the pathology report below, the tissue(s) were tubular adenomatous polyps. Your examination was otherwise without abnormality.    Final Diagnosis   A. Colon, Descending, polyp, polypectomy:  -Fragments of tubular adenoma  -Negative for high-grade dysplasia and malignancy.       Adenomatous polyps are entirely benign (non-cancerous); however, patients who have developed these polyps are at an increased risk for developing additional polyps in the future. If these are not eventually removed, there is a risk of developing colon cancer. We will advise more frequent examinations with you because of the risk associated with this type of polyp.    Given these findings,  I recommend that you undergo a repeat colonoscopy in 7 year(s) for surveillance. We will enter you into a recall system so you receive a reminder closer to the time that you are due for repeat examination.     Please remember that this recommendation is made with the understanding that you are not experiencing persistent changes in bowel function, bleeding per rectum, and/or significant abdominal pain. If you experience these symptoms, please contact your primary care provider for a further evaluation.     If you have any questions or concerns about the results of your colonoscopy or the appropriate follow-up, please contact my assistant at [phone].            Sincerely,        [facility] DO Weiss FACOS [facility]  ___

## 2024-06-03 NOTE — ANESTHESIA CARE TRANSFER NOTE
Patient: Lupillo Smith    Procedure: Procedure(s):  COLONOSCOPY, FLEXIBLE, WITH LESION REMOVAL USING SNARE       Diagnosis: Screen for colon cancer [Z12.11]  Diagnosis Additional Information: No value filed.    Anesthesia Type:   General     Note:    Oropharynx: oropharynx clear of all foreign objects and spontaneously breathing  Level of Consciousness: awake  Oxygen Supplementation: room air    Independent Airway: airway patency satisfactory and stable  Dentition: dentition unchanged  Vital Signs Stable: post-procedure vital signs reviewed and stable  Report to RN Given: handoff report given  Patient transferred to: Phase II    Handoff Report: Identifed the Patient, Identified the Reponsible Provider, Reviewed the pertinent medical history, Discussed the surgical course, Reviewed Intra-OP anesthesia mangement and issues during anesthesia, Set expectations for post-procedure period and Allowed opportunity for questions and acknowledgement of understanding  Vitals:  Vitals Value Taken Time   /92 06/03/24 1317   Temp     Pulse 72 06/03/24 1317   Resp 16 06/03/24 1318   SpO2 94 % 06/03/24 1319   Vitals shown include unfiled device data.    Electronically Signed By: CRYSTAL aHll CRNA  Guillermina 3, 2024  1:21 PM

## 2024-06-03 NOTE — INTERVAL H&P NOTE
"I have reviewed the surgical (or preoperative) H&P that is linked to this encounter, and examined the patient. There are no significant changes    1st colon; no blood thinner; no famhx of colon cancer    Clinical Conditions Present on Arrival:  Clinically Significant Risk Factors Present on Admission                  # Obesity: Estimated body mass index is 39.47 kg/m  as calculated from the following:    Height as of this encounter: 1.816 m (5' 11.5\").    Weight as of this encounter: 130.2 kg (287 lb).       "

## 2024-06-03 NOTE — ANESTHESIA POSTPROCEDURE EVALUATION
Patient: Lupillo Smith    Procedure: Procedure(s):  COLONOSCOPY, FLEXIBLE, WITH LESION REMOVAL USING SNARE       Anesthesia Type:  General    Note:  Disposition: Outpatient   Postop Pain Control: Uneventful            Sign Out: Well controlled pain   PONV: No   Neuro/Psych: Uneventful            Sign Out: Acceptable/Baseline neuro status   Airway/Respiratory: Uneventful            Sign Out: Acceptable/Baseline resp. status   CV/Hemodynamics: Uneventful            Sign Out: Acceptable CV status; No obvious hypovolemia; No obvious fluid overload   Other NRE: NONE   DID A NON-ROUTINE EVENT OCCUR? No       Last vitals:  Vitals Value Taken Time   /92 06/03/24 1317   Temp     Pulse 72 06/03/24 1317   Resp 16 06/03/24 1318   SpO2 94 % 06/03/24 1319   Vitals shown include unfiled device data.    Electronically Signed By: CRYSTAL Hall CRNA  Guillermina 3, 2024  1:21 PM

## 2024-06-05 LAB
PATH REPORT.COMMENTS IMP SPEC: NORMAL
PATH REPORT.COMMENTS IMP SPEC: NORMAL
PATH REPORT.FINAL DX SPEC: NORMAL
PATH REPORT.GROSS SPEC: NORMAL
PATH REPORT.MICROSCOPIC SPEC OTHER STN: NORMAL
PATH REPORT.RELEVANT HX SPEC: NORMAL
PHOTO IMAGE: NORMAL

## 2024-06-19 DIAGNOSIS — E89.0 POSTSURGICAL HYPOTHYROIDISM: ICD-10-CM

## 2024-06-19 DIAGNOSIS — Z85.850 HX OF PAPILLARY THYROID CARCINOMA: Primary | ICD-10-CM

## 2024-07-06 ENCOUNTER — HEALTH MAINTENANCE LETTER (OUTPATIENT)
Age: 59
End: 2024-07-06

## 2024-07-31 ENCOUNTER — TELEPHONE (OUTPATIENT)
Dept: FAMILY MEDICINE | Facility: CLINIC | Age: 59
End: 2024-07-31
Payer: COMMERCIAL

## 2024-07-31 ENCOUNTER — OFFICE VISIT (OUTPATIENT)
Dept: URGENT CARE | Facility: URGENT CARE | Age: 59
End: 2024-07-31
Payer: COMMERCIAL

## 2024-07-31 VITALS
WEIGHT: 291 LBS | HEART RATE: 94 BPM | OXYGEN SATURATION: 96 % | RESPIRATION RATE: 16 BRPM | SYSTOLIC BLOOD PRESSURE: 140 MMHG | TEMPERATURE: 99.5 F | DIASTOLIC BLOOD PRESSURE: 92 MMHG | BODY MASS INDEX: 40.03 KG/M2

## 2024-07-31 DIAGNOSIS — R50.9 FEVER, UNSPECIFIED: ICD-10-CM

## 2024-07-31 DIAGNOSIS — J02.9 SORE THROAT: Primary | ICD-10-CM

## 2024-07-31 LAB
DEPRECATED S PYO AG THROAT QL EIA: NEGATIVE
FLUAV AG SPEC QL IA: NEGATIVE
FLUBV AG SPEC QL IA: NEGATIVE
GROUP A STREP BY PCR: NOT DETECTED

## 2024-07-31 PROCEDURE — 99213 OFFICE O/P EST LOW 20 MIN: CPT

## 2024-07-31 PROCEDURE — 87651 STREP A DNA AMP PROBE: CPT

## 2024-07-31 PROCEDURE — 87804 INFLUENZA ASSAY W/OPTIC: CPT

## 2024-07-31 PROCEDURE — 87635 SARS-COV-2 COVID-19 AMP PRB: CPT

## 2024-07-31 NOTE — TELEPHONE ENCOUNTER
Patient/spouse call for appointment today.   Patient has sore throat, swollen lymph nodes, fatigue, fever and is wanting to be seen.  He is able to swallow/breathe fine.  He had a negative home COVID test yesterday.   Unfortunately RN is unable to secure a regular or same-day appointment anywhere within the clinic dyad today, so advised patient to present to urgent care, since he wants to be seen today.  Information provided for [place] and [place] locations.    Bonnie Mendez RN  [facility]

## 2024-07-31 NOTE — PROGRESS NOTES
"URGENT CARE  Assessment & Plan   Assessment:   Lupillo Smith is a 59 year old male who's clinical presentation today is consistent with:   1. Sore throat   2. Fever, unspecified  - Streptococcus A Rapid Screen w/Reflex to PCR - Clinic Collect  - Influenza A & B Antigen - Clinic Collect  - COVID-19 Virus (Coronavirus) by PCR Nose  Plan:  Will treat patient with supportive and symptomatic measures for pharyngitis at this time which include: Fluids, rest, over-the-counter medications; Educated patient that honey, warm fluids and gargling saltwater can also help  additionally tested for bacterial cause and rapid test was negative for streptococcal A; PCR test pending (updated pt results will come via mychart/call and rx will be reflexed if positive), additionally, educated patient to monitor their symptoms for improvement over the next week and to return for a follow up if the sore throat and/or tonsil swelling does not improve in the next 5-7 days.      No alarm signs or symptoms present   Differential Diagnoses for this patient's chief complaint that I considered include:  Bacterial vs viral etiology of pharyngitis, peritonsillar abscess, Tonsillitis, mono      Patient is agreeable to treatment plan and state they will follow-up if symptoms do not improve and/or if symptoms worsen   see patient's AVS 'monitor for' section for specific patient instructions given and discussed regarding what to watch for and when to follow up    CRYSTAL Cespedes [facility] [place]      ______________________________________________________________________      Subjective     HPI: Lupillo Smith \"Stanton\"} is a 59 year old  male who presents today for evaluation the following concerns:   Patient accompanied by wife endorses a sore throat today which started 1 day ago, yesterday    Patient reports they have been experiencing a sore throat and swollen lymph nodes    Patient also notes fevers, chills, " malaise   Denies breathing issues, swallowing is painful but tolerable      Review of Systems:  Pertinent review of systems as reflected in HPI, otherwise negative.     Objective    Physical Exam:  Vitals:    07/31/24 1018   BP: (!) 140/92   Pulse: 94   Resp: 16   Temp: 99.5  F (37.5  C)   TempSrc: Tympanic   SpO2: 96%   Weight: 132 kg (291 lb)      General: Alert and oriented, no acute distress, Vital signs reviewed   Psy/mental status: Cooperative, nonanxious  SKIN: Intact, no rashes  EYES: EOMs intact, PERRLA bilaterally   Conjunctiva: Clear bilaterally, no injection or erythema present  EARS: TMs intact, translucent gray in color with normal landmarks present no erythema  or bulging tympanic membrane   Canals are without swelling, however have a mild amount of cerumen, no impaction  NOSE:  mucosa moist               No frontal or maxillary sinus tenderness present bilaterally  MOUTH/THROAT: lips, tongue, & oral mucosa appear normal upon inspection                Posterior oropharynx is erythematous but without exudate, lesions or tonsillar  Edema, no dysphonia, no unilateral tonsillar edema, no uvular deviation,   no signs of peritonsillar abscess  NECK: supple, has full range of motion with no meningeal signs              Tonsillar lymphadenopathy present- bilaterally   LUNG: normal work of breathing, good respiratory effort without retractions, good air  movement, non labored, inspection reveals normal chest expansion w/  inspiration            Lung sounds are clear to auscultation bilaterally,            No rales/rhonic/crackles wheezing noted           No cough noted     LABS:   Results for orders placed or performed in visit on 07/31/24   Streptococcus A Rapid Screen w/Reflex to PCR - Clinic Collect     Status: Normal    Specimen: Throat; Swab   Result Value Ref Range    Group A Strep antigen Negative Negative   Influenza A & B Antigen - Clinic Collect     Status: Normal    Specimen: Nose; Swab   Result  Value Ref Range    Influenza A antigen Negative Negative    Influenza B antigen Negative Negative    Narrative    Test results must be correlated with clinical data. If necessary, results should be confirmed by a molecular assay or viral culture.        ______________________________________________________________________    I explained my diagnostic considerations and recommendations to the patient, who voiced understanding and agreement with the treatment plan.   All questions were answered.   We discussed potential side effects, risks and benefits of any prescribed or recommended therapies, as well as expectations for response to treatments.  Please see AVS for any patient instructions & handouts given.   Patient was advised to contact the Nurse Care Line, their Primary Care provider, Urgent Care, or the Emergency Department if there are new or worsening symptoms, or call 911 for emergencies.

## 2024-07-31 NOTE — LETTER
July 31, 2024      Lupillo Smith  [address] [place] [address]        To Whom It May Concern:    Lupillo Smith was seen in our clinic. He may return to work on 8/6/24      Sincerely,        CRYSTAL Cespedes CNP

## 2024-08-01 LAB — SARS-COV-2 RNA RESP QL NAA+PROBE: NEGATIVE

## 2024-10-09 DIAGNOSIS — R73.03 PREDIABETES: ICD-10-CM

## 2024-10-09 DIAGNOSIS — E03.9 HYPOTHYROIDISM, UNSPECIFIED TYPE: Primary | ICD-10-CM

## 2024-10-10 RX ORDER — LEVOTHYROXINE SODIUM 137 UG/1
TABLET ORAL
Qty: 108 TABLET | Refills: 0 | Status: SHIPPED | OUTPATIENT
Start: 2024-10-10

## 2024-10-10 RX ORDER — METFORMIN HYDROCHLORIDE 750 MG/1
1500 TABLET, EXTENDED RELEASE ORAL DAILY
Qty: 180 TABLET | Refills: 0 | Status: SHIPPED | OUTPATIENT
Start: 2024-10-10

## 2024-10-10 NOTE — TELEPHONE ENCOUNTER
reduce levothyroxine from 10 tabs/week to 9 tabs/week. You can continue the 137 mcg tabs.     Requested Prescriptions   Pending Prescriptions Disp Refills    levothyroxine (SYNTHROID/LEVOTHROID) 137 MCG tablet [Pharmacy Med Name: Levothyroxine Sodium Oral Tablet 137 MCG] 180 tablet 0     Sig: take 2 tablets (274 mcg) by mouth once daily for 6 days of the week. on day 7 take 1/2 tablet (68.5 mcg) once daily.       Thyroid Protocol Failed - 10/9/2024  3:58 PM        Failed - Medication indicated for associated diagnosis     Medication is associated with one or more of the following diagnoses:  Hypothyroidism  Thyroid stimulating hormone suppression therapy  Thyroid cancer  Acquired atrophy of thyroid          Passed - Patient is 12 years or older        Passed - Recent (12 mo) or future (30 days) visit within the authorizing provider's specialty     The patient must have completed an in-person or virtual visit within the past 12 months or has a future visit scheduled within the next 90 days with the authorizing provider s specialty.  Urgent care and e-visits do not quality as an office visit for this protocol.          Passed - Medication is active on med list        Passed - Normal TSH on file in past 12 months     Recent Labs   Lab Test 05/13/24  0858   TSH 0.51                metFORMIN (GLUCOPHAGE-XR) 750 MG 24 hr tablet [Pharmacy Med Name: metFORMIN HCl ER Oral Tablet Extended Release 24 Hour 750 MG] 180 tablet 0     Sig: Take 2 tablets (1,500 mg) by mouth daily       Biguanide Agents Failed - 10/9/2024  3:58 PM        Failed - Recent (6 mo) or future (90 days) visit within the authorizing provider's specialty     The patient must have completed an in-person or virtual visit within the past 6 months or has a future visit scheduled within the next 90 days with the authorizing provider s specialty.  Urgent care and e-visits do not quality as an office visit for this protocol.          Passed - Patient is age 10 or  older        Passed - Patient does NOT have a diagnosis of CHF.        Passed - Medication is active on med list        Passed - Medication indicated for associated diagnosis     Medication is associated with one or more of the following diagnoses:     Gestational diabetes mellitus     Hyperinsulinar obesity     Hypersecretion of ovarian androgens    Non-alcoholic fatty liver    Polycystic ovarian syndrome               Pre-diabetes (DM 2 prevention)    Type 2 diabetes mellitus     Weight gain, antipsychotic therapy-induced    Impaired fasting glucose          Passed - Has GFR on file in past 12 months and most recent value is normal

## 2024-11-23 ENCOUNTER — HEALTH MAINTENANCE LETTER (OUTPATIENT)
Age: 59
End: 2024-11-23

## 2024-12-09 DIAGNOSIS — E11.9 TYPE 2 DIABETES MELLITUS WITHOUT COMPLICATION, WITHOUT LONG-TERM CURRENT USE OF INSULIN (H): ICD-10-CM

## 2024-12-10 RX ORDER — TIRZEPATIDE 15 MG/.5ML
INJECTION, SOLUTION SUBCUTANEOUS
Qty: 6 ML | Refills: 0 | Status: SHIPPED | OUTPATIENT
Start: 2024-12-10

## 2024-12-10 NOTE — TELEPHONE ENCOUNTER
Requested Prescriptions   Pending Prescriptions Disp Refills    MOUNJARO 15 MG/0.5ML SOAJ [Pharmacy Med Name: MOUNJARO 15MG/0.5ML SOAJ] 6 mL 1     Sig: INJECT 15 MG UNDER THE SKIN EVERY 7 DAYS, STARTING AFTER 12.5MG WEEKLY FOR 4 WEEKS.       GLP-1 Agonists Protocol Failed - 12/10/2024  3:54 PM        Failed - HgbA1C in past 3 or 6 months     If HgbA1C is 8 or greater, it needs to be on file within the past 3 months.  If less than 8, must be on file within the past 6 months.     Recent Labs   Lab Test 01/29/24  0832   A1C 5.5             Passed - Medication is active on med list        Passed - Has GFR on file in past 12 months and most recent value is normal        Passed - Recent (6 mo) or future (90 days) visit within the authorizing provider's specialty     The patient must have completed an in-person or virtual visit within the past 6 months or has a future visit scheduled within the next 90 days with the authorizing provider s specialty.  Urgent care and e-visits do not quality as an office visit for this protocol.          Passed - Medication indicated for associated diagnosis     Medication is associated with one or more of the following diagnoses:     Type 2 diabetes mellitus           Passed - Patient is age 18 or older

## 2024-12-23 ENCOUNTER — PATIENT OUTREACH (OUTPATIENT)
Dept: CARE COORDINATION | Facility: CLINIC | Age: 59
End: 2024-12-23
Payer: COMMERCIAL

## 2025-01-06 ENCOUNTER — PATIENT OUTREACH (OUTPATIENT)
Dept: CARE COORDINATION | Facility: CLINIC | Age: 60
End: 2025-01-06
Payer: COMMERCIAL

## 2025-01-11 DIAGNOSIS — R73.03 PREDIABETES: ICD-10-CM

## 2025-01-11 DIAGNOSIS — E03.9 HYPOTHYROIDISM, UNSPECIFIED TYPE: ICD-10-CM

## 2025-01-14 RX ORDER — METFORMIN HYDROCHLORIDE 750 MG/1
1500 TABLET, EXTENDED RELEASE ORAL DAILY
Qty: 180 TABLET | Refills: 0 | Status: SHIPPED | OUTPATIENT
Start: 2025-01-14

## 2025-01-14 RX ORDER — LEVOTHYROXINE SODIUM 137 UG/1
TABLET ORAL
Qty: 108 TABLET | Refills: 0 | Status: SHIPPED | OUTPATIENT
Start: 2025-01-14

## 2025-01-14 NOTE — TELEPHONE ENCOUNTER
6/19/24:   reduce levothyroxine from 10 tabs/week to 9 tabs/week. You can continue the 137 mcg tabs.      When we do our next set of labs in 2/2025 we'll check TSH again then.    Requested Prescriptions   Pending Prescriptions Disp Refills    levothyroxine (SYNTHROID/LEVOTHROID) 137 MCG tablet [Pharmacy Med Name: Levothyroxine Sodium Oral Tablet 137 MCG] 108 tablet 0     Sig: Take 2 tablets by mouth 2 days a week and 1 tablet 5 days a week (9 tablets a week total)       Thyroid Protocol Passed - 1/14/2025 10:18 AM        Passed - Patient is 12 years or older        Passed - Medication is active on med list        Passed - Recent (12 mo) or future (90 days) visit within the authorizing provider's specialty     The patient must have completed an in-person or virtual visit within the past 12 months or has a future visit scheduled within the next 90 days with the authorizing provider s specialty.  Urgent care and e-visits do not qualify as an office visit for this protocol.          Passed - Medication indicated for associated diagnosis     Medication is associated with one or more of the following diagnoses:  Hypothyroidism  Thyroid stimulating hormone suppression therapy  Thyroid cancer  Acquired atrophy of thyroid          Passed - Normal TSH on file in past 12 months     Recent Labs   Lab Test 05/13/24  0858   TSH 0.51                metFORMIN (GLUCOPHAGE-XR) 750 MG 24 hr tablet [Pharmacy Med Name: metFORMIN HCl ER Oral Tablet Extended Release 24 Hour 750 MG] 180 tablet 0     Sig: Take 2 tablets (1,500 mg) by mouth daily       Biguanide Agents Failed - 1/14/2025 10:18 AM        Failed - Recent (6 mo) or future (90 days) visit within the authorizing provider's specialty     The patient must have completed an in-person or virtual visit within the past 6 months or has a future visit scheduled within the next 90 days with the authorizing provider s specialty.  Urgent care and e-visits do not quality as an office visit  for this protocol.          Passed - Patient is age 10 or older        Passed - Patient does NOT have a diagnosis of CHF.        Passed - Medication is active on med list        Passed - Medication indicated for associated diagnosis     Medication is associated with one or more of the following diagnoses:     Gestational diabetes mellitus     Hyperinsulinar obesity     Hypersecretion of ovarian androgens    Non-alcoholic fatty liver    Polycystic ovarian syndrome               Pre-diabetes (DM 2 prevention)    Type 2 diabetes mellitus     Weight gain, antipsychotic therapy-induced    Impaired fasting glucose          Passed - Has GFR on file in past 12 months and most recent value is normal

## 2025-02-03 ENCOUNTER — LAB (OUTPATIENT)
Dept: LAB | Facility: CLINIC | Age: 60
End: 2025-02-03
Payer: COMMERCIAL

## 2025-02-03 DIAGNOSIS — E11.9 TYPE 2 DIABETES MELLITUS WITHOUT COMPLICATION, WITHOUT LONG-TERM CURRENT USE OF INSULIN (H): ICD-10-CM

## 2025-02-03 DIAGNOSIS — E89.0 POSTSURGICAL HYPOTHYROIDISM: ICD-10-CM

## 2025-02-03 DIAGNOSIS — Z85.850 HX OF PAPILLARY THYROID CARCINOMA: ICD-10-CM

## 2025-02-03 LAB
CHOLEST SERPL-MCNC: 182 MG/DL
CREAT SERPL-MCNC: 0.87 MG/DL (ref 0.67–1.17)
CREAT UR-MCNC: 271.7 MG/DL
EGFRCR SERPLBLD CKD-EPI 2021: >90 ML/MIN/1.73M2
EST. AVERAGE GLUCOSE BLD GHB EST-MCNC: 108 MG/DL
FASTING STATUS PATIENT QL REPORTED: YES
HBA1C MFR BLD: 5.4 % (ref 0–5.6)
HDLC SERPL-MCNC: 44 MG/DL
LDLC SERPL CALC-MCNC: 112 MG/DL
Lab: NORMAL
MICROALBUMIN UR-MCNC: <12 MG/L
MICROALBUMIN/CREAT UR: NORMAL MG/G{CREAT}
NONHDLC SERPL-MCNC: 138 MG/DL
PERFORMING LABORATORY: NORMAL
SPECIMEN STATUS: NORMAL
TEST NAME: NORMAL
TRIGL SERPL-MCNC: 132 MG/DL
TSH SERPL DL<=0.005 MIU/L-ACNC: 2.06 UIU/ML (ref 0.3–4.2)

## 2025-02-03 PROCEDURE — 82043 UR ALBUMIN QUANTITATIVE: CPT

## 2025-02-03 PROCEDURE — 82565 ASSAY OF CREATININE: CPT

## 2025-02-03 PROCEDURE — 99000 SPECIMEN HANDLING OFFICE-LAB: CPT

## 2025-02-03 PROCEDURE — 83036 HEMOGLOBIN GLYCOSYLATED A1C: CPT

## 2025-02-03 PROCEDURE — 80061 LIPID PANEL: CPT

## 2025-02-03 PROCEDURE — 82570 ASSAY OF URINE CREATININE: CPT

## 2025-02-03 PROCEDURE — 36415 COLL VENOUS BLD VENIPUNCTURE: CPT

## 2025-02-03 PROCEDURE — 84432 ASSAY OF THYROGLOBULIN: CPT | Mod: 90

## 2025-02-03 PROCEDURE — 86800 THYROGLOBULIN ANTIBODY: CPT

## 2025-02-03 PROCEDURE — 84443 ASSAY THYROID STIM HORMONE: CPT

## 2025-02-04 LAB
MAYO MISC RESULT: NORMAL
SCANNED LAB RESULT: ABNORMAL
TEST NAME: ABNORMAL

## 2025-02-11 ENCOUNTER — OFFICE VISIT (OUTPATIENT)
Dept: ENDOCRINOLOGY | Facility: CLINIC | Age: 60
End: 2025-02-11
Payer: COMMERCIAL

## 2025-02-11 VITALS
HEART RATE: 77 BPM | WEIGHT: 295 LBS | OXYGEN SATURATION: 98 % | DIASTOLIC BLOOD PRESSURE: 86 MMHG | BODY MASS INDEX: 40.58 KG/M2 | SYSTOLIC BLOOD PRESSURE: 131 MMHG

## 2025-02-11 DIAGNOSIS — E88.819 INSULIN RESISTANCE: ICD-10-CM

## 2025-02-11 DIAGNOSIS — E66.01 CLASS 3 SEVERE OBESITY DUE TO EXCESS CALORIES WITH SERIOUS COMORBIDITY AND BODY MASS INDEX (BMI) OF 40.0 TO 44.9 IN ADULT (H): ICD-10-CM

## 2025-02-11 DIAGNOSIS — Z85.850 HX OF PAPILLARY THYROID CARCINOMA: Primary | ICD-10-CM

## 2025-02-11 DIAGNOSIS — E03.9 HYPOTHYROIDISM, UNSPECIFIED TYPE: ICD-10-CM

## 2025-02-11 DIAGNOSIS — E89.0 HISTORY OF TOTAL THYROIDECTOMY: ICD-10-CM

## 2025-02-11 DIAGNOSIS — E66.813 CLASS 3 SEVERE OBESITY DUE TO EXCESS CALORIES WITH SERIOUS COMORBIDITY AND BODY MASS INDEX (BMI) OF 40.0 TO 44.9 IN ADULT (H): ICD-10-CM

## 2025-02-11 DIAGNOSIS — E78.5 DYSLIPIDEMIA: ICD-10-CM

## 2025-02-11 DIAGNOSIS — E89.0 POSTSURGICAL HYPOTHYROIDISM: ICD-10-CM

## 2025-02-11 DIAGNOSIS — E11.9 TYPE 2 DIABETES MELLITUS WITHOUT COMPLICATION, WITHOUT LONG-TERM CURRENT USE OF INSULIN (H): ICD-10-CM

## 2025-02-11 RX ORDER — TIRZEPATIDE 15 MG/.5ML
15 INJECTION, SOLUTION SUBCUTANEOUS WEEKLY
Qty: 6 ML | Refills: 4 | Status: SHIPPED | OUTPATIENT
Start: 2025-02-11

## 2025-02-11 RX ORDER — LEVOTHYROXINE SODIUM 137 UG/1
TABLET ORAL
Qty: 108 TABLET | Refills: 4 | Status: SHIPPED | OUTPATIENT
Start: 2025-02-11

## 2025-02-11 NOTE — PROGRESS NOTES
"Subjective:    Established patient    Lupillo Smith is a 59 year old male who presents for hypothyroidism and DM-2.  The following is a comprehensive summary of his endocrine care to date, chart fully reviewed for all endocrinopathies.    # Micropapillary thyroid carcinoma s/p total thyroidectomy in 2013    He had a long-standing history of thyroid nodules and ultimately underwent total thyroidectomy in 2013. Pre-op he did have SOB from compression. Pre-op: no dysphagia, no hoarseness. No prior H/N radiation. No FH of thyroid cancer.     7/15/2013: underwent total thyroidectomy (thyroid was reported as 337 grams of pathology specimen), per [facility] notes the indication for thyroidectomy was an enlarging goiter with tracheal deviation and the micro PTC was an incidental finding. I did read the operative report (Dr. Arnaud Jones) with the following notable findings: \"the gland was massively enlarged\", no mention of any parathyroid gland damage, no mention of gross extrathyroidal extension, no mention of lymphadenopathy.                            FINAL SURGICAL PATHOLOGY REPORT           Pathology #: [id number]                     Date Obtained: 07/15/2013                                                 Date Received: 07/15/2013     DIAGNOSIS:    Thyroid, total thyroidectomy:      PROCEDURE:                         Total thyroidectomy    RECEIVED:                          In formalin    SPECIMEN INTEGRITY:                Intact    SPECIMEN SIZE:     Right lobe:                       13 x 8.5 x 7.5 cm     Left lobe:                        5.8 x 5 x 2.5 cm      SPECIMEN WEIGHT:                   337 g    TUMOR FOCALITY:                    Unifocal      DOMINANT TUMOR:    TUMOR LATERALITY:                  Left lobe    TUMOR SIZE:     Greatest dimension:               0.5 cm    HISTOLOGIC TYPE:                   Papillary carcinoma     Variant, specify:                 Follicular variant     Architecture:        " "             Follicular     Cytomorphology:                   Classical    HISTOLOGIC GRADE:                  G1: Well differentiated    MARGINS:                           Margins uninvolved by carcinoma     Distance of invasive carcinoma to     closest margin:                   6 mm    TUMOR CAPSULE:                     Partially encapsulated    TUMOR CAPSULAR INVASION:           Not identified    LYMPH-VASCULAR INVASION:           Not identified    EXTRATHYROIDAL EXTENSION:          Not identified      PATHOLOGIC STAGING    PRIMARY TUMOR:                     pT1: Tumor size 2 cm or less,                                       limited to thyroid    REGIONAL LYMPH NODES:              pNX: Regional lymph nodes cannot be                                       assessed     Number examined:                  0     Number involved:                  N/A      ADDITIONAL PATHOLOGIC FINDINGS:    Adenomatous nodule(s) or Nodular                                       follicular disease (eg, nodular                                       hyperplasia, goitrous thyroid).                                       Thryoiditis.        No adjuvant I-131 was given.     Third most recent thyroid US done 9/23/2014 at [facility]:  -images not available, per OSH radiology read: RICHARD but the lateral neck was reportedly not examined     Second most recent thyroid US done 1/28/2019 at [facility]:   -images not available, per OSH radiology read: \"small, normal-appearing lymph nodes are seen in the left and right neck. None have microcystic change or calcification.\"    2/3/2025: TSH 2.06, [place] send out lab - Tg Ab <1.8 IU/mL, Tg TM 0.2 ng/mL    5/13/2024: TSH 0.51, [place] send out lab - Tg Ab <1.8 IU/mL, Tg TM 0.4 ng/mL     Neck US 5/6/2024 at [place], levels 2-7 examined bilaterally:  -No residual thyroid tissue   -No lymphadenopathy  -Minimal calcified plaque in the right common carotid artery    12/20/2022: TSH 0.75, [place] send out lab - Tg " "Ab <1.8 IU/mL, Tg TM 0.3 ng/mL      3/2/2021: per the lab - Tg level \"detected\", \"detected\" means the value is between 0.2 to 0.8 ng/mL, but a numeric value cannot be reported.    2017: Tg TM 0.2 ng/mL, Tg Ab undetectable, TSH 0.45    2014: Tg TM 0.4 ng/mL, Tg Ab undetectable, TSH 12.35 - this was the highest ever Tg TM post-op    2/11/2025: no compressive symptoms. He has not noticed any nodularity in the neck.    3/2022: serum calcium normal (no prior hypercalcemia); he did not have any issues with parathyroid function after surgery     # Hypothyroidism      6/19/2024: Reduce LT4 (137 mcg tabs) from 10 tabs/week to 9 tabs/week     2/2025: TSH 2.06    2/11/2025: he continues LT4 137 mcg tabs, 9 tabs/week (1 tab/day 5 days/week and 2 tabs/day 2 days/week)     # DM-2    Diagnosed with DM: prediabetic range HbA1c since ~2007    History of DKA/HHS: no     FH of DM: paternal grandmother with DM-2     FH of autoimmune disease: no    Glycemic control over the years: excellent     Current DM therapy:  -Metformin XR used for a few years - current dose 1500 mg daily; generally well tolerated   -Mounjaro started 1/2023, increased to 15 mg 4/2023; well tolerated      Prior DM therapy:  -Only metformin and Mounjaro     Current glycemic control: at goal    Diet: 3 small meals/day, doesn't drink calories outside of milk     Physical activity: home exercise equipment (aerobics), he is a former college football player - [employer]      Tobacco/alcohol use: no    Complications noted in the A/P section.     No prior weight loss surgery/procedure. No prior weight loss medication.    No personal/FH: pancreatitis, pancreatic cancer, MTC, MEN     Objective:    BMI 40.6 kg/m2, /86. Well healed lower anterior neck thyroidectomy scar. No nodularity in the thyroid bed. No cervical LAD.     2/13/2024: BMI 40.3 kg/m2, /78. Well healed lower anterior neck thyroidectomy scar. No nodularity in the thyroid bed. No cervical LAD.     6/2023: " BMI 40.5 kg/meters squared, blood pressure 126/84. Well healed lower anterior neck thyroidectomy scar. No nodularity in the thyroid bed. No cervical LAD. Radial pulse with a regular rate and rhythm.    12/2022: BMI 46 kg/m2, /88. No overt Cushingoid features. No thyroid eye disease. Well healed lower anterior neck thyroidectomy scar. No nodularity in the thyroid bed. No cervical LAD. Radial pulse with a regular rate and rhythm. DM foot exam performed and normal.     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 Return in 1 year with TSH, Tg TM/Ab and a clinical neck exam.     # DM-2  -2/2025: HbA1c 5.4% (stable)   # No retinopathy on eye exam 1/2023   -He has a local eye exam coming up  # Intermittently elevated blood pressure, hasn't been on an ACE-I/ARB    -2/2025: GFR >90, urine microalbumin normal   # Possible mild peripheral neuropathy   # No prior ASCVD event   # Dyslipidemia, not on ASA or a statin   -2/2025: , , HDL 44,   # Medically complicated obesity     His weight has plateaued and to break through the plateau he will increase activity level and consider calorie restricting (we reviewed calorie tracking - My Fitness Pal). No change in DM medications. MTM referral to see if Mounjaro is available at a more affordable cost.     We also reviewed starting a statin and we will defer at this time but likely do so down the road.    He will check a glucose a few times per month to include fasting, before meals, bedtime, and 2 hours after eating.  Goal fasting and before meals less than 130 mg/deciliter and goal 2 hours after eating less than 150 mg/deciliter.    Return in 1 year with labs ordered.    27 minutes spent on the date of the encounter doing chart review, history and exam, documentation and further activities as noted above.     The longitudinal plan of care for the  diagnosis(es)/condition(s) as documented were addressed during this visit. Due to the added complexity in care, I will continue to support Stanton in the subsequent management and with ongoing continuity of care.

## 2025-02-11 NOTE — LETTER
"2/11/2025      Lupillo Smith  [address] [place] [address]      Dear Colleague,    Thank you for referring your patient, Lupillo Smith, to the [facility]. Please see a copy of my visit note below.    Subjective:    Established patient    Lupillo Smith is a 59 year old male who presents for hypothyroidism and DM-2.  The following is a comprehensive summary of his endocrine care to date, chart fully reviewed for all endocrinopathies.    # Micropapillary thyroid carcinoma s/p total thyroidectomy in 2013    He had a long-standing history of thyroid nodules and ultimately underwent total thyroidectomy in 2013. Pre-op he did have SOB from compression. Pre-op: no dysphagia, no hoarseness. No prior H/N radiation. No FH of thyroid cancer.     7/15/2013: underwent total thyroidectomy (thyroid was reported as 337 grams of pathology specimen), per [facility] Endocrine notes the indication for thyroidectomy was an enlarging goiter with tracheal deviation and the micro PTC was an incidental finding. I did read the operative report (Dr. Arnaud Jones) with the following notable findings: \"the gland was massively enlarged\", no mention of any parathyroid gland damage, no mention of gross extrathyroidal extension, no mention of lymphadenopathy.                            FINAL SURGICAL PATHOLOGY REPORT           Pathology #: [id number]                     Date Obtained: 07/15/2013                                                 Date Received: 07/15/2013     DIAGNOSIS:    Thyroid, total thyroidectomy:      PROCEDURE:                         Total thyroidectomy    RECEIVED:                          In formalin    SPECIMEN INTEGRITY:                Intact    SPECIMEN SIZE:     Right lobe:                       13 x 8.5 x 7.5 cm     Left lobe:                        5.8 x 5 x 2.5 cm      SPECIMEN WEIGHT:                   337 g    TUMOR FOCALITY:                    Unifocal      DOMINANT TUMOR: " "   TUMOR LATERALITY:                  Left lobe    TUMOR SIZE:     Greatest dimension:               0.5 cm    HISTOLOGIC TYPE:                   Papillary carcinoma     Variant, specify:                 Follicular variant     Architecture:                     Follicular     Cytomorphology:                   Classical    HISTOLOGIC GRADE:                  G1: Well differentiated    MARGINS:                           Margins uninvolved by carcinoma     Distance of invasive carcinoma to     closest margin:                   6 mm    TUMOR CAPSULE:                     Partially encapsulated    TUMOR CAPSULAR INVASION:           Not identified    LYMPH-VASCULAR INVASION:           Not identified    EXTRATHYROIDAL EXTENSION:          Not identified      PATHOLOGIC STAGING    PRIMARY TUMOR:                     pT1: Tumor size 2 cm or less,                                       limited to thyroid    REGIONAL LYMPH NODES:              pNX: Regional lymph nodes cannot be                                       assessed     Number examined:                  0     Number involved:                  N/A      ADDITIONAL PATHOLOGIC FINDINGS:    Adenomatous nodule(s) or Nodular                                       follicular disease (eg, nodular                                       hyperplasia, goitrous thyroid).                                       Thryoiditis.        No adjuvant I-131 was given.     Third most recent thyroid US done 9/23/2014 at [facility]:  -images not available, per OSH radiology read: RICHARD but the lateral neck was reportedly not examined     Second most recent thyroid US done 1/28/2019 at [facility]:   -images not available, per OSH radiology read: \"small, normal-appearing lymph nodes are seen in the left and right neck. None have microcystic change or calcification.\"    2/3/2025: TSH 2.06, [facility] send out lab - Tg Ab <1.8 IU/mL, Tg TM 0.2 ng/mL    5/13/2024: TSH 0.51, [facility] send out lab - Tg Ab <1.8 " "IU/mL, Tg TM 0.4 ng/mL     Neck US 5/6/2024 at [place], levels 2-7 examined bilaterally:  -No residual thyroid tissue   -No lymphadenopathy  -Minimal calcified plaque in the right common carotid artery    12/20/2022: TSH 0.75, [facility] send out lab - Tg Ab <1.8 IU/mL, Tg TM 0.3 ng/mL      3/2/2021: per the lab - Tg level \"detected\", \"detected\" means the value is between 0.2 to 0.8 ng/mL, but a numeric value cannot be reported.    2017: Tg TM 0.2 ng/mL, Tg Ab undetectable, TSH 0.45    2014: Tg TM 0.4 ng/mL, Tg Ab undetectable, TSH 12.35 - this was the highest ever Tg TM post-op    2/11/2025: no compressive symptoms. He has not noticed any nodularity in the neck.    3/2022: serum calcium normal (no prior hypercalcemia); he did not have any issues with parathyroid function after surgery     # Hypothyroidism      6/19/2024: Reduce LT4 (137 mcg tabs) from 10 tabs/week to 9 tabs/week     2/2025: TSH 2.06    2/11/2025: he continues LT4 137 mcg tabs, 9 tabs/week (1 tab/day 5 days/week and 2 tabs/day 2 days/week)     # DM-2    Diagnosed with DM: prediabetic range HbA1c since ~2007    History of DKA/HHS: no     FH of DM: paternal grandmother with DM-2     FH of autoimmune disease: no    Glycemic control over the years: excellent     Current DM therapy:  -Metformin XR used for a few years - current dose 1500 mg daily; generally well tolerated   -Mounjaro started 1/2023, increased to 15 mg 4/2023; well tolerated      Prior DM therapy:  -Only metformin and Mounjaro     Current glycemic control: at goal    Diet: 3 small meals/day, doesn't drink calories outside of milk     Physical activity: home exercise equipment (aerobics), he is a former college football player - [employer]      Tobacco/alcohol use: no    Complications noted in the A/P section.     No prior weight loss surgery/procedure. No prior weight loss medication.    No personal/FH: pancreatitis, pancreatic cancer, MTC, MEN     Objective:    BMI 40.6 kg/m2, /86. Well healed " lower anterior neck thyroidectomy scar. No nodularity in the thyroid bed. No cervical LAD.     2/13/2024: BMI 40.3 kg/m2, /78. Well healed lower anterior neck thyroidectomy scar. No nodularity in the thyroid bed. No cervical LAD.     6/2023: BMI 40.5 kg/meters squared, blood pressure 126/84. Well healed lower anterior neck thyroidectomy scar. No nodularity in the thyroid bed. No cervical LAD. Radial pulse with a regular rate and rhythm.    12/2022: BMI 46 kg/m2, /88. No overt Cushingoid features. No thyroid eye disease. Well healed lower anterior neck thyroidectomy scar. No nodularity in the thyroid bed. No cervical LAD. Radial pulse with a regular rate and rhythm. DM foot exam performed and normal.     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 Return in 1 year with TSH, Tg TM/Ab and a clinical neck exam.     # DM-2  -2/2025: HbA1c 5.4% (stable)   # No retinopathy on eye exam 1/2023   -He has a local eye exam coming up  # Intermittently elevated blood pressure, hasn't been on an ACE-I/ARB    -2/2025: GFR >90, urine microalbumin normal   # Possible mild peripheral neuropathy   # No prior ASCVD event   # Dyslipidemia, not on ASA or a statin   -2/2025: , , HDL 44,   # Medically complicated obesity     His weight has plateaued and to break through the plateau he will increase activity level and consider calorie restricting (we reviewed calorie tracking - My Fitness Pal). No change in DM medications. MTM referral to see if Mounjaro is available at a more affordable cost.     We also reviewed starting a statin and we will defer at this time but likely do so down the road.    He will check a glucose a few times per month to include fasting, before meals, bedtime, and 2 hours after eating.  Goal fasting and before meals less than 130 mg/deciliter and goal 2 hours after eating less than 150  mg/deciliter.    Return in 1 year with labs ordered.    27 minutes spent on the date of the encounter doing chart review, history and exam, documentation and further activities as noted above.     The longitudinal plan of care for the diagnosis(es)/condition(s) as documented were addressed during this visit. Due to the added complexity in care, I will continue to support Stanton in the subsequent management and with ongoing continuity of care.      Again, thank you for allowing me to participate in the care of your patient.        Sincerely,        Charan Hodgson MD    Electronically signed

## 2025-02-13 ENCOUNTER — TELEPHONE (OUTPATIENT)
Dept: ENDOCRINOLOGY | Facility: CLINIC | Age: 60
End: 2025-02-13
Payer: COMMERCIAL

## 2025-02-13 NOTE — TELEPHONE ENCOUNTER
MTM referral from: [facility] clinic visit (referral by provider)    MTM referral outreach attempt #2 on February 13, 2025 at 10:57 AM      Outcome: Patient not reachable after several attempts, routed to Pharmacist Team/Provider as an [employer] Message Sent    [facility]

## 2025-03-08 ENCOUNTER — HEALTH MAINTENANCE LETTER (OUTPATIENT)
Age: 60
End: 2025-03-08

## 2025-03-13 ENCOUNTER — VIRTUAL VISIT (OUTPATIENT)
Dept: PHARMACY | Facility: CLINIC | Age: 60
End: 2025-03-13
Attending: INTERNAL MEDICINE
Payer: COMMERCIAL

## 2025-03-13 DIAGNOSIS — E89.0 POSTOPERATIVE HYPOTHYROIDISM: ICD-10-CM

## 2025-03-13 DIAGNOSIS — L50.9 URTICARIA: ICD-10-CM

## 2025-03-13 DIAGNOSIS — R73.03 PREDIABETES: Primary | ICD-10-CM

## 2025-03-13 DIAGNOSIS — F17.290 CIGAR SMOKER: ICD-10-CM

## 2025-03-13 RX ORDER — METFORMIN HYDROCHLORIDE 750 MG/1
1500 TABLET, EXTENDED RELEASE ORAL DAILY
Qty: 180 TABLET | Refills: 3 | Status: SHIPPED | OUTPATIENT
Start: 2025-03-13

## 2025-03-13 NOTE — PROGRESS NOTES
Medication Therapy Management (MTM) Encounter    ASSESSMENT:                            Medication Adherence/Access: Recommended patient utilize Mounjaro  coupon card to help with co-pay cost and to consider calling their insurance to better understand current co-pay structure. Additionally sent order for metformin to [facility] order [facility], all other medications have been transferred.     Smoking Cessation: Patient not interested in quitting at this time.     Diabetes: Stable, Patient's A1c remains at goal of <7% no recent blood sugar readings to further assess.     Hypothyroidism: Stable, recent TSH has improved and is within normal limits after most recent levothyroxine dose adjustments.     Chronic idiopathic urticaria: Stable per patient reports.     PLAN:                            Use  coupon card to help with cost of Mounjaro, bring the information to the pharmacy and they will re-process the order through your insurance first then the coupon card second, give the pharmacy staff the information before you pay for the medication.     Link: [email or web address]    Additionally, consider contacting your insurance company to get further details of the co-pay cost and structure for Mounjaro    Sent order for metformin to [facility], all other mediations are on file, please call the pharmacy at [phone] to set up future fills when needed.     Follow-up: MTM as needed.     Endocrine Team & Next Follow-Up:  2/17/26 with Dr. Hodgson    SUBJECTIVE/OBJECTIVE:                          Stanton Smith is a 59 year old male called for an initial visit. He was referred to me from Dr. Hodgson.     Reason for visit: Mounjaro affordability concerns    Allergies/ADRs: Reviewed in chart  Past Medical History: Reviewed in chart  Tobacco: He reports that he has been smoking cigars. He has never used smokeless tobacco.   Alcohol: 3-5 beverages /  week    Medication Adherence/Access: Patient notes that co-pay for Mounjaro is expensive, recently paid ~$800 for a 3 month supply. Notes that in the past, his first fill of the year has always been expensive. Does have a yearly deductible that he has to meet each year. Has not called his insurance at this point to discuss co-pay structure. Patient notes that cost of other medications is not a concern. Additionally, patient notes that they prefer to change all medications to [facility] order pharmacy to help with convenience.     Smoking Cessation: Patient reports smoking one cigar every couple months, notes that they are not interested in quitting at this time.     Diabetes   Patient diagnosed with type 2 diabetes   Current Medications:   Metformin XR 1500 mg daily  Mounjaro 15 once weekly on Mondays - patient reports constipation symptoms on and off usually occurring on the injection day and lasting for some days after, notes that they have been taking a fiber supplement daily and this has been effective to help with symptoms. Reports that symptoms are overall tolerable and not bothersome. Declines any other side effects.   Blood sugar monitoring:   Patient notes that they monitor their blood sugars occasionally but did not have any recent blood sugar readings to discuss today, notes that they plan to re-start monitoring again as recommended by Dr. Hodgson.     Hypothyroidism   Current Medications:  Levothyroxine 137 mcg- patient is taking 2 tablets 2 days a week and 1 tablet 5 days a week (9 tablets a week total)   Patient notes that with recent weight loss, he has been working with Dr. Hodgson to gradually reduce his levothyroxine dose, at this time he feels that his levels are stable now with current dose of levothyroxine.   Denies side effects or symptoms of hypo/hyperthyroidism     Chronic idiopathic urticaria:   Current Medications:   hydroxyzine 25 mg daily- patient notes that this dose has been  effective, does not report any side effects.     Today's Vitals: There were no vitals taken for this visit.  ----------------  I spent 30 minutes with this patient today. All changes were made via collaborative practice agreement with Charan Hodgson.     A summary of these recommendations was sent via [employer].    Thank you,  Rufus Mcknight, Pharmacy Student   [facility], [facility]), PharmD  Endocrine & Diabetes Medication Therapy Management Pharmacist   [address] [place] [address]     Contact information:   To schedule a MTM appointment: [phone]  For questions or concerns, please send a [employer] message or call the clinic at [phone].    For more urgent concerns that do not require 911, please call [phone] after hours/weekends and ask to speak with the Endocrinologist on call.      Telemedicine Visit Details  The patient's medications can be safely assessed via a telemedicine encounter.  Type of service:  Telephone visit  Originating Location (pt. Location): Home    Distant Location (provider location):  Off-site  Start Time:  9:02 AM  End Time:  9:32 AM     Medication Therapy Recommendations  Prediabetes   1 Current Medication: Tirzepatide (MOUNJARO) 15 MG/0.5ML SOAJ   Current Medication Sig: Inject 0.5 mLs (15 mg) subcutaneously once a week.   Rationale: Cannot afford medication product - Cost - Adherence   Recommendation: Referral to Service    Status: Accepted - no CPA Needed   Identified Date: 3/13/2025 Completed Date: 3/13/2025

## 2025-03-13 NOTE — PATIENT INSTRUCTIONS
"Recommendations from today's MTM visit:                                                      Use  coupon card to help with cost of Mounjaro, bring the information to the pharmacy and they will re-process the order through your insurance first then the coupon card second, give the pharmacy staff the information before you pay for the medication.     Link: [email or web address]    Additionally, consider contacting your insurance company to get further details of the co-pay cost and structure for Mounjaro    Sent order for metformin to [facility] order pharmacy, all other mediations are on file, please call the pharmacy at [phone] to set up future fills when needed.     Follow-up: MTM as needed.     Endocrine Team & Next Follow-Up:  2/17/26 with Dr. Hodgson    It was great speaking with you today.  I value your experience and would be very thankful for your time in providing feedback in our clinic survey. In the next few days, you may receive an email or text message from [id number] with a link to a survey related to your  clinical pharmacist.\"     To schedule another MTM appointment, please call the clinic directly or you may call the MTM scheduling line at [phone].    My Clinical Pharmacist's contact information:                                                      Please feel free to contact me with any questions or concerns you have.      Patricia Paulson), PharmD  Endocrine & Diabetes Medication Therapy Management Pharmacist   [address] [place] [address]     Contact information:   To schedule a MTM appointment: [phone]  For questions or concerns, please send a [employer] message or call the clinic at [phone].    For more urgent concerns that do not require 158, please call [phone] after hours/weekends and ask to speak with the Endocrinologist on call.     "

## 2025-04-30 ENCOUNTER — TELEPHONE (OUTPATIENT)
Dept: ENDOCRINOLOGY | Facility: CLINIC | Age: 60
End: 2025-04-30
Payer: COMMERCIAL

## 2025-04-30 NOTE — TELEPHONE ENCOUNTER
Prior Authorization Approval    Medication: MOUNJARO 15 MG/0.5ML SC SOAJ  Authorization Effective Date: 3/31/2025  Authorization Expiration Date: 4/30/2026  Approved Dose/Quantity: uud  Reference #: Key: [id number]   Insurance Company: [employer] Non-Specialty PA's - Phone [phone] Fax [phone]  Expected CoPay: $    CoPay Card Available:      Financial Assistance Needed:    Which Pharmacy is filling the prescription:      Key: [id number]             Thank you,     Heather Palomares [facility]  Clinic Liaison  [facility]

## 2025-05-07 ENCOUNTER — PATIENT OUTREACH (OUTPATIENT)
Dept: FAMILY MEDICINE | Facility: CLINIC | Age: 60
End: 2025-05-07
Payer: COMMERCIAL

## 2025-05-07 NOTE — TELEPHONE ENCOUNTER
Patient Quality Outreach    Patient is due for the following:   Physical Preventive Adult Physical    Action(s) Taken:   Schedule a Adult Preventative    Type of outreach:    Sent [employer] message.    Questions for provider review:    None         Lydia Rene  Chart routed to None.        [Right] : right knee [Positive] : positive Ashley [Left] : left knee [NL (0)] : extension 0 degrees [4___] : quadriceps 4[unfilled]/5 [5___] : hamstring 5[unfilled]/5 [Equivocal] : equivocal Ashley [] : patient ambulates without assistive device [FreeTextEntry8] : still [TWNoteComboBox7] : flexion 100 degrees

## 2025-05-11 ENCOUNTER — HEALTH MAINTENANCE LETTER (OUTPATIENT)
Age: 60
End: 2025-05-11

## 2025-05-21 ENCOUNTER — PATIENT OUTREACH (OUTPATIENT)
Dept: CARE COORDINATION | Facility: CLINIC | Age: 60
End: 2025-05-21
Payer: COMMERCIAL

## 2025-07-15 DIAGNOSIS — L50.1 CHRONIC IDIOPATHIC URTICARIA: ICD-10-CM

## 2025-07-15 RX ORDER — HYDROXYZINE HYDROCHLORIDE 50 MG/1
50 TABLET, FILM COATED ORAL DAILY PRN
Qty: 90 TABLET | Refills: 3 | OUTPATIENT
Start: 2025-07-15

## 2025-07-15 NOTE — TELEPHONE ENCOUNTER
Requested Prescriptions   Pending Prescriptions Disp Refills    hydrOXYzine HCl (ATARAX) 50 MG tablet 90 tablet 3     Sig: Take 1 tablet (50 mg) by mouth daily as needed for itching (hives).       There is no refill protocol information for this order          Last office visit: 5/17/2024 ; last virtual visit: Visit date not found with prescribing provider:  Corey Welsh      Future Office Visit:      Amalia Baird   Clinic Station    [facility]  [phone]

## 2025-07-15 NOTE — TELEPHONE ENCOUNTER
Routing refill request to provider for review/approval because:  Patient needs to be seen because it has been more than 1 year since last office visit.    Last seen 5/17/2024. No further appointment scheduled at this time.     Christie BARONE RN  Specialty/Allergy Clinics

## 2025-07-15 NOTE — TELEPHONE ENCOUNTER
Declined.  I have not seen the patient for more than 1 year.  He needs a follow-up appointment for further refills.    Corey Welsh MD

## 2025-07-21 ENCOUNTER — PATIENT OUTREACH (OUTPATIENT)
Dept: CARE COORDINATION | Facility: CLINIC | Age: 60
End: 2025-07-21
Payer: COMMERCIAL

## 2025-07-21 DIAGNOSIS — L50.1 CHRONIC IDIOPATHIC URTICARIA: ICD-10-CM

## 2025-07-21 NOTE — TELEPHONE ENCOUNTER
Requested Prescriptions   Pending Prescriptions Disp Refills    hydrOXYzine HCl (ATARAX) 25 MG tablet 90 tablet 3     Sig: Take 0.5-1 tablets (12.5-25 mg) by mouth daily as needed for itching.       There is no refill protocol information for this order          Last office visit: 7/18/2025 ; last virtual visit: Visit date not found with prescribing provider:  Corey Welsh   Future Office Visit:      Thank you,  Deana NULL/ Complex   [facility] Specialty  [address] [place] [address]  Employed by [employer]

## 2025-07-22 RX ORDER — HYDROXYZINE HYDROCHLORIDE 25 MG/1
12.5-25 TABLET, FILM COATED ORAL DAILY PRN
Qty: 90 TABLET | Refills: 3 | Status: SHIPPED | OUTPATIENT
Start: 2025-07-22

## 2025-07-22 NOTE — TELEPHONE ENCOUNTER
Refilled hydroxyzine per [facility] protocol.    Sofia Pagan RN on 7/22/2025 at 8:10 AM

## 2025-07-23 DIAGNOSIS — L50.1 CHRONIC IDIOPATHIC URTICARIA: ICD-10-CM

## 2025-07-23 RX ORDER — HYDROXYZINE HYDROCHLORIDE 25 MG/1
12.5-25 TABLET, FILM COATED ORAL DAILY PRN
Qty: 90 TABLET | Refills: 3 | OUTPATIENT
Start: 2025-07-23

## 2025-07-23 NOTE — TELEPHONE ENCOUNTER
Requested Prescriptions   Pending Prescriptions Disp Refills    hydrOXYzine HCl (ATARAX) 25 MG tablet 90 tablet 3     Sig: Take 0.5-1 tablets (12.5-25 mg) by mouth daily as needed for itching.       There is no refill protocol information for this order          Last office visit: 7/18/2025 ; last virtual visit: Visit date not found with prescribing provider:  Corey Welsh      Future Office Visit:          Amalia Baird   Clinic Station    [facility]  [phone]

## 2025-07-23 NOTE — TELEPHONE ENCOUNTER
Too soon. This was sent on 7/22 for 1 year supply.     Christie BARONE RN  Specialty/Allergy Clinics

## 2025-07-28 ENCOUNTER — TELEPHONE (OUTPATIENT)
Dept: ALLERGY | Facility: CLINIC | Age: 60
End: 2025-07-28
Payer: COMMERCIAL

## 2025-07-28 NOTE — TELEPHONE ENCOUNTER
Refill request    hydrOXYzine HCl (ATARAX) 50 MG tablet    Last office visit: 7/18/2025 ; last virtual visit: Visit date not found with prescribing provider:  Corey Welsh   Future Office Visit:      Thank you,  Deana Gutierrez  PSC/ Complex   [facility]  [address] [place] [address]  Employed by [employer]

## 2025-07-29 NOTE — TELEPHONE ENCOUNTER
This was sent on 7/22/2025 with 1 year supply.     Christie BARONE RN  Specialty/Allergy Clinics

## 2025-08-24 ENCOUNTER — HEALTH MAINTENANCE LETTER (OUTPATIENT)
Age: 60
End: 2025-08-24

## (undated) DEVICE — SU MONOCRYL 3-0 PS-2 18" UND Y497G

## (undated) DEVICE — DRAPE SHEET REV FOLD 3/4 9349

## (undated) DEVICE — SU MONOCRYL 2-0 CT-1 36" UND Y945H

## (undated) DEVICE — SUCTION IRR SYSTEM W/TIP INTERPULSE

## (undated) DEVICE — TAPE CLOTH ADHESIVE 3" ZONAS

## (undated) DEVICE — PACK TOTAL HIP W/POUCH RIVERSIDE LATEX FREE

## (undated) DEVICE — ESU ELEC BLADE 4" COATED

## (undated) DEVICE — DRSG AQUACEL AG 3.5X9.75" HYDROFIBER 412011

## (undated) DEVICE — BLADE SAW SAGITTAL STRK 18X90X1.37MM HD SYS 6 6118-137-090

## (undated) DEVICE — GLOVE PROTEXIS W/NEU-THERA 6.5  2D73TE65

## (undated) DEVICE — ENDO SNARE EXACTO COLD 9MM LOOP 2.4MMX230CM 00711115

## (undated) DEVICE — SOL NACL 0.9% IRRIG 3000ML BAG 2B7477

## (undated) DEVICE — GLOVE PROTEXIS BLUE W/NEU-THERA 6.5  2D73EB65

## (undated) DEVICE — STOCKING SLEEVE COMPRESSION CALF LG

## (undated) DEVICE — DEVICE RETRIEVER HEWSON 71111579

## (undated) DEVICE — SOL NACL 0.9% IRRIG 3000ML BAG 07972-08

## (undated) DEVICE — GLOVE PROTEXIS W/NEU-THERA 8.0  2D73TE80

## (undated) DEVICE — SOL WATER IRRIG 1000ML BOTTLE 07139-09

## (undated) DEVICE — SU ETHIBOND 5 V-37 4X30" MB66G

## (undated) DEVICE — GOWN IMPERVIOUS SPECIALTY XLG/XLONG 32474

## (undated) DEVICE — ESU PENCIL SMOKE EVAC W/ROCKER SWITCH 0703-047-000

## (undated) DEVICE — GOWN XLG DISP 9545

## (undated) DEVICE — PILLOW ABDUCT HIP LG

## (undated) DEVICE — SU PDO 1 STRATAFIX 36X36CM CTX TAPERPOINT SXPD2B405

## (undated) DEVICE — BONE CLEANING TIP INTERPULSE  0210-010-000

## (undated) DEVICE — PREP CHLORAPREP 26ML TINTED ORANGE  260815

## (undated) DEVICE — SU VICRYL 1 CT-1 36" UND J947H

## (undated) DEVICE — SYR BULB IRRIG 50ML LATEX FREE 0035280

## (undated) RX ORDER — EPINEPHRINE 1 MG/ML(1)
AMPUL (ML) INJECTION
Status: DISPENSED
Start: 2020-06-24

## (undated) RX ORDER — HYDROMORPHONE HYDROCHLORIDE 1 MG/ML
INJECTION, SOLUTION INTRAMUSCULAR; INTRAVENOUS; SUBCUTANEOUS
Status: DISPENSED
Start: 2020-09-03

## (undated) RX ORDER — GABAPENTIN 300 MG/1
CAPSULE ORAL
Status: DISPENSED
Start: 2020-09-03

## (undated) RX ORDER — FENTANYL CITRATE-0.9 % NACL/PF 10 MCG/ML
PLASTIC BAG, INJECTION (ML) INTRAVENOUS
Status: DISPENSED
Start: 2020-09-03

## (undated) RX ORDER — LIDOCAINE HYDROCHLORIDE 10 MG/ML
INJECTION, SOLUTION EPIDURAL; INFILTRATION; INTRACAUDAL; PERINEURAL
Status: DISPENSED
Start: 2020-06-24

## (undated) RX ORDER — MAGNESIUM SULFATE HEPTAHYDRATE 40 MG/ML
INJECTION, SOLUTION INTRAVENOUS
Status: DISPENSED
Start: 2020-09-03

## (undated) RX ORDER — ONDANSETRON 2 MG/ML
INJECTION INTRAMUSCULAR; INTRAVENOUS
Status: DISPENSED
Start: 2020-09-03

## (undated) RX ORDER — PHENYLEPHRINE HYDROCHLORIDE 10 MG/ML
INJECTION INTRAVENOUS
Status: DISPENSED
Start: 2020-09-03

## (undated) RX ORDER — FENTANYL CITRATE 50 UG/ML
INJECTION, SOLUTION INTRAMUSCULAR; INTRAVENOUS
Status: DISPENSED
Start: 2020-09-03

## (undated) RX ORDER — LIDOCAINE HYDROCHLORIDE 20 MG/ML
JELLY TOPICAL
Status: DISPENSED
Start: 2020-09-03

## (undated) RX ORDER — TRANEXAMIC ACID 650 MG/1
TABLET ORAL
Status: DISPENSED
Start: 2020-09-03

## (undated) RX ORDER — DEXAMETHASONE SODIUM PHOSPHATE 4 MG/ML
INJECTION, SOLUTION INTRA-ARTICULAR; INTRALESIONAL; INTRAMUSCULAR; INTRAVENOUS; SOFT TISSUE
Status: DISPENSED
Start: 2020-09-03

## (undated) RX ORDER — ACETAMINOPHEN 325 MG/1
TABLET ORAL
Status: DISPENSED
Start: 2020-09-03

## (undated) RX ORDER — LIDOCAINE HYDROCHLORIDE 10 MG/ML
INJECTION, SOLUTION EPIDURAL; INFILTRATION; INTRACAUDAL; PERINEURAL
Status: DISPENSED
Start: 2024-06-03

## (undated) RX ORDER — LIDOCAINE HYDROCHLORIDE 10 MG/ML
INJECTION, SOLUTION EPIDURAL; INFILTRATION; INTRACAUDAL; PERINEURAL
Status: DISPENSED
Start: 2020-09-03

## (undated) RX ORDER — BUPIVACAINE HYDROCHLORIDE 5 MG/ML
INJECTION, SOLUTION EPIDURAL; INTRACAUDAL
Status: DISPENSED
Start: 2020-06-24